# Patient Record
Sex: MALE | Race: BLACK OR AFRICAN AMERICAN | NOT HISPANIC OR LATINO | URBAN - METROPOLITAN AREA
[De-identification: names, ages, dates, MRNs, and addresses within clinical notes are randomized per-mention and may not be internally consistent; named-entity substitution may affect disease eponyms.]

---

## 2017-12-08 PROBLEM — Z00.00 ENCOUNTER FOR PREVENTIVE HEALTH EXAMINATION: Status: ACTIVE | Noted: 2017-12-08

## 2019-08-17 ENCOUNTER — INPATIENT (INPATIENT)
Facility: HOSPITAL | Age: 39
LOS: 1 days | Discharge: OTHER INSTITUTION | DRG: 638 | End: 2019-08-19
Attending: INTERNAL MEDICINE | Admitting: INTERNAL MEDICINE
Payer: MEDICAID

## 2019-08-17 VITALS
HEIGHT: 66 IN | RESPIRATION RATE: 16 BRPM | OXYGEN SATURATION: 98 % | HEART RATE: 88 BPM | SYSTOLIC BLOOD PRESSURE: 115 MMHG | DIASTOLIC BLOOD PRESSURE: 67 MMHG | WEIGHT: 169.98 LBS | TEMPERATURE: 99 F

## 2019-08-17 DIAGNOSIS — Z89.422 ACQUIRED ABSENCE OF OTHER LEFT TOE(S): Chronic | ICD-10-CM

## 2019-08-17 DIAGNOSIS — E11.9 TYPE 2 DIABETES MELLITUS WITHOUT COMPLICATIONS: ICD-10-CM

## 2019-08-17 DIAGNOSIS — E11.621 TYPE 2 DIABETES MELLITUS WITH FOOT ULCER: ICD-10-CM

## 2019-08-17 DIAGNOSIS — Z29.9 ENCOUNTER FOR PROPHYLACTIC MEASURES, UNSPECIFIED: ICD-10-CM

## 2019-08-17 DIAGNOSIS — M25.469 EFFUSION, UNSPECIFIED KNEE: ICD-10-CM

## 2019-08-17 LAB
ALBUMIN SERPL ELPH-MCNC: 2.8 G/DL — LOW (ref 3.5–5)
ALP SERPL-CCNC: 83 U/L — SIGNIFICANT CHANGE UP (ref 40–120)
ALT FLD-CCNC: 15 U/L DA — SIGNIFICANT CHANGE UP (ref 10–60)
ANION GAP SERPL CALC-SCNC: 2 MMOL/L — LOW (ref 5–17)
AST SERPL-CCNC: 13 U/L — SIGNIFICANT CHANGE UP (ref 10–40)
BASOPHILS # BLD AUTO: 0.03 K/UL — SIGNIFICANT CHANGE UP (ref 0–0.2)
BASOPHILS NFR BLD AUTO: 0.3 % — SIGNIFICANT CHANGE UP (ref 0–2)
BILIRUB SERPL-MCNC: 0.5 MG/DL — SIGNIFICANT CHANGE UP (ref 0.2–1.2)
BUN SERPL-MCNC: 18 MG/DL — SIGNIFICANT CHANGE UP (ref 7–18)
CALCIUM SERPL-MCNC: 8.9 MG/DL — SIGNIFICANT CHANGE UP (ref 8.4–10.5)
CHLORIDE SERPL-SCNC: 100 MMOL/L — SIGNIFICANT CHANGE UP (ref 96–108)
CO2 SERPL-SCNC: 33 MMOL/L — HIGH (ref 22–31)
CREAT SERPL-MCNC: 1.34 MG/DL — HIGH (ref 0.5–1.3)
EOSINOPHIL # BLD AUTO: 0.1 K/UL — SIGNIFICANT CHANGE UP (ref 0–0.5)
EOSINOPHIL NFR BLD AUTO: 1.1 % — SIGNIFICANT CHANGE UP (ref 0–6)
ERYTHROCYTE [SEDIMENTATION RATE] IN BLOOD: 84 MM/HR — HIGH (ref 0–15)
GLUCOSE BLDC GLUCOMTR-MCNC: 152 MG/DL — HIGH (ref 70–99)
GLUCOSE BLDC GLUCOMTR-MCNC: 236 MG/DL — HIGH (ref 70–99)
GLUCOSE BLDC GLUCOMTR-MCNC: 255 MG/DL — HIGH (ref 70–99)
GLUCOSE SERPL-MCNC: 301 MG/DL — HIGH (ref 70–99)
HBA1C BLD-MCNC: 14 % — HIGH (ref 4–5.6)
HCT VFR BLD CALC: 35.5 % — LOW (ref 39–50)
HGB BLD-MCNC: 11.9 G/DL — LOW (ref 13–17)
IMM GRANULOCYTES NFR BLD AUTO: 0.3 % — SIGNIFICANT CHANGE UP (ref 0–1.5)
LACTATE SERPL-SCNC: 0.9 MMOL/L — SIGNIFICANT CHANGE UP (ref 0.7–2)
LYMPHOCYTES # BLD AUTO: 1.18 K/UL — SIGNIFICANT CHANGE UP (ref 1–3.3)
LYMPHOCYTES # BLD AUTO: 13.3 % — SIGNIFICANT CHANGE UP (ref 13–44)
MAGNESIUM SERPL-MCNC: 2.5 MG/DL — SIGNIFICANT CHANGE UP (ref 1.6–2.6)
MCHC RBC-ENTMCNC: 29.2 PG — SIGNIFICANT CHANGE UP (ref 27–34)
MCHC RBC-ENTMCNC: 33.5 GM/DL — SIGNIFICANT CHANGE UP (ref 32–36)
MCV RBC AUTO: 87.2 FL — SIGNIFICANT CHANGE UP (ref 80–100)
MONOCYTES # BLD AUTO: 1.03 K/UL — HIGH (ref 0–0.9)
MONOCYTES NFR BLD AUTO: 11.6 % — SIGNIFICANT CHANGE UP (ref 2–14)
NEUTROPHILS # BLD AUTO: 6.5 K/UL — SIGNIFICANT CHANGE UP (ref 1.8–7.4)
NEUTROPHILS NFR BLD AUTO: 73.4 % — SIGNIFICANT CHANGE UP (ref 43–77)
NRBC # BLD: 0 /100 WBCS — SIGNIFICANT CHANGE UP (ref 0–0)
PHOSPHATE SERPL-MCNC: 3.2 MG/DL — SIGNIFICANT CHANGE UP (ref 2.5–4.5)
PLATELET # BLD AUTO: 291 K/UL — SIGNIFICANT CHANGE UP (ref 150–400)
POTASSIUM SERPL-MCNC: 4.7 MMOL/L — SIGNIFICANT CHANGE UP (ref 3.5–5.3)
POTASSIUM SERPL-SCNC: 4.7 MMOL/L — SIGNIFICANT CHANGE UP (ref 3.5–5.3)
PROT SERPL-MCNC: 7.5 G/DL — SIGNIFICANT CHANGE UP (ref 6–8.3)
RBC # BLD: 4.07 M/UL — LOW (ref 4.2–5.8)
RBC # FLD: 11.9 % — SIGNIFICANT CHANGE UP (ref 10.3–14.5)
SODIUM SERPL-SCNC: 135 MMOL/L — SIGNIFICANT CHANGE UP (ref 135–145)
WBC # BLD: 8.87 K/UL — SIGNIFICANT CHANGE UP (ref 3.8–10.5)
WBC # FLD AUTO: 8.87 K/UL — SIGNIFICANT CHANGE UP (ref 3.8–10.5)

## 2019-08-17 PROCEDURE — 73562 X-RAY EXAM OF KNEE 3: CPT | Mod: 26,RT

## 2019-08-17 PROCEDURE — 73660 X-RAY EXAM OF TOE(S): CPT | Mod: 26,LT

## 2019-08-17 PROCEDURE — 99285 EMERGENCY DEPT VISIT HI MDM: CPT

## 2019-08-17 PROCEDURE — 73718 MRI LOWER EXTREMITY W/O DYE: CPT | Mod: 26,LT

## 2019-08-17 RX ORDER — PIPERACILLIN AND TAZOBACTAM 4; .5 G/20ML; G/20ML
3.38 INJECTION, POWDER, LYOPHILIZED, FOR SOLUTION INTRAVENOUS ONCE
Refills: 0 | Status: COMPLETED | OUTPATIENT
Start: 2019-08-17 | End: 2019-08-17

## 2019-08-17 RX ORDER — DEXTROSE 50 % IN WATER 50 %
15 SYRINGE (ML) INTRAVENOUS ONCE
Refills: 0 | Status: DISCONTINUED | OUTPATIENT
Start: 2019-08-17 | End: 2019-08-19

## 2019-08-17 RX ORDER — AMPICILLIN SODIUM AND SULBACTAM SODIUM 250; 125 MG/ML; MG/ML
INJECTION, POWDER, FOR SUSPENSION INTRAMUSCULAR; INTRAVENOUS
Refills: 0 | Status: DISCONTINUED | OUTPATIENT
Start: 2019-08-17 | End: 2019-08-19

## 2019-08-17 RX ORDER — GLUCAGON INJECTION, SOLUTION 0.5 MG/.1ML
1 INJECTION, SOLUTION SUBCUTANEOUS ONCE
Refills: 0 | Status: DISCONTINUED | OUTPATIENT
Start: 2019-08-17 | End: 2019-08-19

## 2019-08-17 RX ORDER — INSULIN LISPRO 100/ML
VIAL (ML) SUBCUTANEOUS
Refills: 0 | Status: DISCONTINUED | OUTPATIENT
Start: 2019-08-17 | End: 2019-08-19

## 2019-08-17 RX ORDER — AMPICILLIN SODIUM AND SULBACTAM SODIUM 250; 125 MG/ML; MG/ML
3 INJECTION, POWDER, FOR SUSPENSION INTRAMUSCULAR; INTRAVENOUS EVERY 12 HOURS
Refills: 0 | Status: DISCONTINUED | OUTPATIENT
Start: 2019-08-17 | End: 2019-08-19

## 2019-08-17 RX ORDER — IBUPROFEN 200 MG
600 TABLET ORAL ONCE
Refills: 0 | Status: COMPLETED | OUTPATIENT
Start: 2019-08-17 | End: 2019-08-17

## 2019-08-17 RX ORDER — HEPARIN SODIUM 5000 [USP'U]/ML
5000 INJECTION INTRAVENOUS; SUBCUTANEOUS EVERY 8 HOURS
Refills: 0 | Status: DISCONTINUED | OUTPATIENT
Start: 2019-08-17 | End: 2019-08-19

## 2019-08-17 RX ORDER — INSULIN GLARGINE 100 [IU]/ML
15 INJECTION, SOLUTION SUBCUTANEOUS AT BEDTIME
Refills: 0 | Status: DISCONTINUED | OUTPATIENT
Start: 2019-08-17 | End: 2019-08-19

## 2019-08-17 RX ORDER — AMPICILLIN SODIUM AND SULBACTAM SODIUM 250; 125 MG/ML; MG/ML
3 INJECTION, POWDER, FOR SUSPENSION INTRAMUSCULAR; INTRAVENOUS ONCE
Refills: 0 | Status: COMPLETED | OUTPATIENT
Start: 2019-08-17 | End: 2019-08-17

## 2019-08-17 RX ORDER — VANCOMYCIN HCL 1 G
1000 VIAL (EA) INTRAVENOUS ONCE
Refills: 0 | Status: COMPLETED | OUTPATIENT
Start: 2019-08-17 | End: 2019-08-17

## 2019-08-17 RX ORDER — ACETAMINOPHEN 500 MG
650 TABLET ORAL EVERY 6 HOURS
Refills: 0 | Status: DISCONTINUED | OUTPATIENT
Start: 2019-08-17 | End: 2019-08-19

## 2019-08-17 RX ORDER — SODIUM CHLORIDE 9 MG/ML
1000 INJECTION, SOLUTION INTRAVENOUS
Refills: 0 | Status: DISCONTINUED | OUTPATIENT
Start: 2019-08-17 | End: 2019-08-19

## 2019-08-17 RX ORDER — SODIUM CHLORIDE 9 MG/ML
1000 INJECTION INTRAMUSCULAR; INTRAVENOUS; SUBCUTANEOUS ONCE
Refills: 0 | Status: COMPLETED | OUTPATIENT
Start: 2019-08-17 | End: 2019-08-17

## 2019-08-17 RX ADMIN — AMPICILLIN SODIUM AND SULBACTAM SODIUM 200 GRAM(S): 250; 125 INJECTION, POWDER, FOR SUSPENSION INTRAMUSCULAR; INTRAVENOUS at 12:01

## 2019-08-17 RX ADMIN — Medication 250 MILLIGRAM(S): at 14:24

## 2019-08-17 RX ADMIN — AMPICILLIN SODIUM AND SULBACTAM SODIUM 200 GRAM(S): 250; 125 INJECTION, POWDER, FOR SUSPENSION INTRAMUSCULAR; INTRAVENOUS at 18:22

## 2019-08-17 RX ADMIN — Medication 600 MILLIGRAM(S): at 07:45

## 2019-08-17 RX ADMIN — SODIUM CHLORIDE 1000 MILLILITER(S): 9 INJECTION INTRAMUSCULAR; INTRAVENOUS; SUBCUTANEOUS at 10:52

## 2019-08-17 RX ADMIN — Medication 600 MILLIGRAM(S): at 07:11

## 2019-08-17 RX ADMIN — PIPERACILLIN AND TAZOBACTAM 200 GRAM(S): 4; .5 INJECTION, POWDER, LYOPHILIZED, FOR SOLUTION INTRAVENOUS at 10:52

## 2019-08-17 RX ADMIN — Medication 2: at 17:37

## 2019-08-17 RX ADMIN — INSULIN GLARGINE 15 UNIT(S): 100 INJECTION, SOLUTION SUBCUTANEOUS at 21:25

## 2019-08-17 RX ADMIN — Medication 3: at 12:07

## 2019-08-17 NOTE — H&P ADULT - PROBLEM SELECTOR PLAN 2
-Patient is not taking any medications currently. He was taking 20 units insulin and stopped last month.:   Accucheck:  Before meals and at bedtime  f/u Hba1c -Patient is not taking any medications currently. He was taking 20 units insulin and stopped last month.:   Blood sugars are high 301 and Cr is 1.34, baseline unknown most likely due to diabetes complications.  Started on Lantus 15 units  Accucheck:  Before meals and at bedtime  f/u Hba1c

## 2019-08-17 NOTE — H&P ADULT - PROBLEM SELECTOR PLAN 1
DM, Vitals are stable  f/u ESR, CRP  No fever No leukocytosis   ED s/p zosyn and vanco  Will get the Blood cultures   c/w unasyn 3gm every 12 hours  c/w pain management tylenol  f/u xray left toe  F/u podiatry   f/u MRI DM, Vitals are stable  f/u ESR, CRP  No fever No leukocytosis   ED s/p zosyn and vanco  Will get the Blood cultures   c/w unasyn 3gm every 12 hours  c/w pain management tylenol  f/u xray left toe  F/u podiatry   f/u MRI of left foot

## 2019-08-17 NOTE — ED PROVIDER NOTE - PROGRESS NOTE DETAILS
bedside sono reveals no drainable fluid collection in prepatellar area. also noted sock on left foot with brown stain, sock removed and I noted a 2x2cm ulceration at the base of the big toe, patient reports he noted it 2 days ago. no fever no pain. patient also reports he stopped taking insulin and Diabetes Mellitus medication 2 weeks ago because it makes him ho to bathroom. admit for IV antibiotics, podiatry consult and glycemic control. also noted left foot status post 2nd toe amputation (pt reports about a year ago)

## 2019-08-17 NOTE — ED ADULT NURSE NOTE - OBJECTIVE STATEMENT
presented to ed with c/o r knee pain for 3 days denies any injury, patient says it started as a cut and now there is a scab, able to walk  wound on the  l big toe, healing and l 4 th toe amputated.

## 2019-08-17 NOTE — PATIENT PROFILE ADULT - NSASFUNCLEVELADLTRANSFER_GEN_A_NUR
Principal Discharge DX:	Acute left ankle pain Principal Discharge DX:	Acute left ankle pain  Instructions for follow-up, activity and diet:	Follow up with orthopedics in 1-2 days.  (See list attached)  Rest, Ice 3-4 x a day x 48hours, elevate ankle, ace/aircast.  Use crutches to ambulate.  Take Motrin 600mg orally every 8 hours as needed for pain take with food.  Return to the ER for any persistent/worsening or new symptoms weakness, numbness or any concerning symptoms. 0 = independent

## 2019-08-17 NOTE — H&P ADULT - NSHPREVIEWOFSYSTEMS_GEN_ALL_CORE
REVIEW OF SYSTEMS:    CONSTITUTIONAL: No weakness, fevers or chills  EYES/ENT: No visual changes;  No vertigo or throat pain   NECK: No pain or stiffness  RESPIRATORY: No cough, wheezing, hemoptysis; No shortness of breath  CARDIOVASCULAR: No chest pain or palpitations  GASTROINTESTINAL: No abdominal or epigastric pain. No nausea, vomiting, or hematemesis; No diarrhea or constipation. No melena or hematochezia.  GENITOURINARY: No dysuria, frequency or hematuria  NEUROLOGICAL: No numbness or weakness  SKIN: itching, is present   All other review of systems is negative unless indicated above.

## 2019-08-17 NOTE — H&P ADULT - NSHPLABSRESULTS_GEN_ALL_CORE
LABS:                        11.9   8.87  )-----------( 291      ( 17 Aug 2019 08:37 )             35.5     08-17    135  |  100  |  18  ----------------------------<  301<H>  4.7   |  33<H>  |  1.34<H>    Ca    8.9      17 Aug 2019 08:37    TPro  7.5  /  Alb  2.8<L>  /  TBili  0.5  /  DBili  x   /  AST  13  /  ALT  15  /  AlkPhos  83  08-17        LIVER FUNCTIONS - ( 17 Aug 2019 08:37 )  Alb: 2.8 g/dL / Pro: 7.5 g/dL / ALK PHOS: 83 U/L / ALT: 15 U/L DA / AST: 13 U/L / GGT: x

## 2019-08-17 NOTE — ED PROVIDER NOTE - LOWER EXTREMITY EXAM, RIGHT
JOINT SWELLING/R knee swelling with no tenderness, full range of motion, R knee warm to touch compared to L knee

## 2019-08-17 NOTE — H&P ADULT - HISTORY OF PRESENT ILLNESS
40 y/o M came from Shelter with PMHx of DM non compliant with medications and PSx of Rt 4 th toe due to infection last yr presents to the ED with complaints of R knee swelling x 1 week. Patient states he had itching on Rt knee, scab was formed and then little pus was draining on squeezing since 2 days. His swelling is progressively worsening and unable to walk. He is feeling hot but never checked his temperature. He has ulcer on left great toe but no pain. He was diagnosed with DM 5 years ago, he was treated initially with pills but not well controlled. Insulin 20 units was added. He was complaining of diarrhea x 3 times since he was started on insulin and pills. He stopped taking insulin and pills since last month due to worsening diarrhea. Pt is unable to recall his medications, Pharmacy and PMD. Denies fever, chills, trauma, Nausea, vomiting.

## 2019-08-17 NOTE — ED PROVIDER NOTE - PMH
No pertinent past medical history Bleeding that does not stop/Fever greater than 101/Pain not relieved by Medications <<----- Click to add NO pertinent Past Medical History

## 2019-08-17 NOTE — ED PROVIDER NOTE - OBJECTIVE STATEMENT
40 y/o M with no significant PMHx/PSHx presents to the ED with complaints of R knee swelling x few days. Patient states he was squeezing it yesterday and pus came out of the scab. Denies fever, chills or any other acute complaints.

## 2019-08-17 NOTE — ED ADULT NURSE NOTE - NSIMPLEMENTINTERV_GEN_ALL_ED
Implemented All Universal Safety Interventions:  [place] to call system. Call bell, personal items and telephone within reach. Instruct patient to call for assistance. Room bathroom lighting operational. Non-slip footwear when patient is off stretcher. Physically safe environment: no spills, clutter or unnecessary equipment. Stretcher in lowest position, wheels locked, appropriate side rails in place.

## 2019-08-17 NOTE — ED PROVIDER NOTE - CLINICAL SUMMARY MEDICAL DECISION MAKING FREE TEXT BOX
38 y/o M presents with R knee swelling. X-ray revealed soft tissue swelling. Which count is normal. Will obtain bedside ultrasound to check for collection.

## 2019-08-17 NOTE — CONSULT NOTE ADULT - SUBJECTIVE AND OBJECTIVE BOX
Patient is a 39y old  Male who presents with a chief complaint of right knee pain and left hallux ulceration    HPI: This 39 year old diabetic male presents to the ED with complaint of right knee pain with pus.  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  He states that he notice his hallux ulcer approximately 3 days ago, but did not do any treatment for it.  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  He denies trauma to the hallux.  He states when he had his previous amputation, the toe was red, swollen, and infected.  He states he stopped taking his diabetes medication and his sugar is high.  He denies n/v/d/f at this time      PMH: History of left 4th partial ray amputation    Allergies: No Known Allergies    Medications: piperacillin/tazobactam IVPB. 3.375 Gram(s) IV Intermittent Once  sodium chloride 0.9% Bolus 1000 milliLiter(s) IV Bolus once  vancomycin  IVPB 1000 milliGRAM(s) IV Intermittent once    FH:  PSX: No significant past surgical history    SH: piperacillin/tazobactam IVPB. 3.375 Gram(s) IV Intermittent Once  sodium chloride 0.9% Bolus 1000 milliLiter(s) IV Bolus once  vancomycin  IVPB 1000 milliGRAM(s) IV Intermittent once      Vital Signs Last 24 Hrs  T(C): 36.8 (17 Aug 2019 10:33), Max: 37.1 (17 Aug 2019 06:15)  T(F): 98.2 (17 Aug 2019 10:33), Max: 98.8 (17 Aug 2019 06:15)  HR: 75 (17 Aug 2019 10:33) (75 - 88)  BP: 111/66 (17 Aug 2019 10:33) (111/66 - 115/67)  BP(mean): --  RR: 16 (17 Aug 2019 10:33) (16 - 16)  SpO2: 98% (17 Aug 2019 10:33) (98% - 98%)    LABS                        11.9   8.87  )-----------( 291      ( 17 Aug 2019 08:37 )             35.5               08-17    135  |  100  |  18  ----------------------------<  301<H>  4.7   |  33<H>  |  1.34<H>    Ca    8.9      17 Aug 2019 08:37    TPro  7.5  /  Alb  2.8<L>  /  TBili  0.5  /  DBili  x   /  AST  13  /  ALT  15  /  AlkPhos  83  08-17        PHYSICAL EXAM  GEN: COLE FERNANDEZ is a pleasant well-nourished, well developed 39y Male in no acute distress, alert awake, and oriented to person, place and time.   LE Focused:    Vasc:  DP and PT pulses palpable.  Unable to assess CFT to left hallux. CFT brisk to all other digits. Mild swelling of left hallux.    Derm: Left hallux ulceration of distal aspect measuring approxately 2.5cm x 2.5cm x 0.2cm down to the level of fascia.  Granular base with hyperkeratotic rim.  No malodor, no purulence, no probe to bone, mild periwound erythema.    Neuro: Diminished protective sensation b/l  MSK: No gross structural abnormalities    Imaging: xray taken-pending  Cultures: taken pending    A: Pressure ulcer left hallux    P:  Patient evaluated, chart reviewed  Xrays reviewed pending official read  Wound culture taken-pending  Recommend MRI of left foot  Recommend antibiotics per ID or primary team  Podiatry will follow while in house  Dress with 4x4 gauze, kenneth.  Recommend dressing changes of betadine, 4x4 gauze, kenneth.   Pending ESR/CRP.  Podiatry will following while in house  Discussed with Attending Dr. Mackay.

## 2019-08-17 NOTE — H&P ADULT - PROBLEM SELECTOR PLAN 3
Left knee swelling.  Xray showed marked prepatellar soft tissue swelling, small suprapatellar effusion. no fracture.

## 2019-08-17 NOTE — H&P ADULT - NSHPPHYSICALEXAM_GEN_ALL_CORE
CONSTITUTIONAL: Well appearing, well nourished, awake, alert and in no apparent distress  ENT: Airway patent, Nasal mucosa clear. Mouth with normal mucosa. Throat has no vesicles, no oropharyngeal exudates and uvula is midline.  EYES: Clear bilaterally, pupils equal, round and reactive to light. EOMI.  CARDIAC: Normal rate, regular rhythm.  Heart sounds S1, S2.  No murmurs, rubs or gallops   RESPIRATORY: Breath sounds clear and equal bilaterally. No wheezes, rhales or rhonchi  MUSCULOSKELETAL: Spine appears normal, range of motion is not limited, no muscle or joint tenderness  EXTREMITIES: Rt knee is swollen and 2 pus points are seen, no limited ROM.  NEUROLOGICAL: Alert and oriented, no focal deficits, no motor or sensory deficits.  SKIN: Ulcer on the dorsum surface of left great toe, measuring 5 x 5 cm, granulation tissue is present, no purulent drainage, no redness, no warmth, tender to touch, sensations intact and pulses intact.

## 2019-08-17 NOTE — H&P ADULT - PROBLEM SELECTOR PLAN 4
IMPROVE VTE Individual Risk Assessment    RISK                                                                Points  [  ] Previous VTE                                                  3  [  ] Thrombophilia                                               2  [  ] Lower limb paralysis                                      2        (unable to hold up >15 seconds)    [  ] Current Cancer                                              2         (within 6 months)  [x ] Immobilization > 24 hrs                                1  [  ] ICU/CCU stay > 24 hours                              1  [  ] Age > 60                                                      1  IMPROVE VTE Score _No __DVT ppx as the pt is ambulating._____  )

## 2019-08-18 LAB
24R-OH-CALCIDIOL SERPL-MCNC: 9.4 NG/ML — LOW (ref 30–80)
ANION GAP SERPL CALC-SCNC: 4 MMOL/L — LOW (ref 5–17)
BUN SERPL-MCNC: 16 MG/DL — SIGNIFICANT CHANGE UP (ref 7–18)
CALCIUM SERPL-MCNC: 8.5 MG/DL — SIGNIFICANT CHANGE UP (ref 8.4–10.5)
CHLORIDE SERPL-SCNC: 104 MMOL/L — SIGNIFICANT CHANGE UP (ref 96–108)
CO2 SERPL-SCNC: 30 MMOL/L — SIGNIFICANT CHANGE UP (ref 22–31)
CREAT SERPL-MCNC: 1.2 MG/DL — SIGNIFICANT CHANGE UP (ref 0.5–1.3)
CRP SERPL-MCNC: 12.45 MG/DL — HIGH (ref 0–0.4)
FOLATE SERPL-MCNC: 16.5 NG/ML — SIGNIFICANT CHANGE UP
GLUCOSE BLDC GLUCOMTR-MCNC: 183 MG/DL — HIGH (ref 70–99)
GLUCOSE BLDC GLUCOMTR-MCNC: 194 MG/DL — HIGH (ref 70–99)
GLUCOSE BLDC GLUCOMTR-MCNC: 213 MG/DL — HIGH (ref 70–99)
GLUCOSE BLDC GLUCOMTR-MCNC: 301 MG/DL — HIGH (ref 70–99)
GLUCOSE BLDC GLUCOMTR-MCNC: 94 MG/DL — SIGNIFICANT CHANGE UP (ref 70–99)
GLUCOSE SERPL-MCNC: 221 MG/DL — HIGH (ref 70–99)
HBA1C BLD-MCNC: 14.1 % — HIGH (ref 4–5.6)
HCT VFR BLD CALC: 35.6 % — LOW (ref 39–50)
HGB BLD-MCNC: 11.7 G/DL — LOW (ref 13–17)
MAGNESIUM SERPL-MCNC: 2.3 MG/DL — SIGNIFICANT CHANGE UP (ref 1.6–2.6)
MCHC RBC-ENTMCNC: 28.7 PG — SIGNIFICANT CHANGE UP (ref 27–34)
MCHC RBC-ENTMCNC: 32.9 GM/DL — SIGNIFICANT CHANGE UP (ref 32–36)
MCV RBC AUTO: 87.5 FL — SIGNIFICANT CHANGE UP (ref 80–100)
NRBC # BLD: 0 /100 WBCS — SIGNIFICANT CHANGE UP (ref 0–0)
PHOSPHATE SERPL-MCNC: 2.9 MG/DL — SIGNIFICANT CHANGE UP (ref 2.5–4.5)
PLATELET # BLD AUTO: 325 K/UL — SIGNIFICANT CHANGE UP (ref 150–400)
POTASSIUM SERPL-MCNC: 4.2 MMOL/L — SIGNIFICANT CHANGE UP (ref 3.5–5.3)
POTASSIUM SERPL-SCNC: 4.2 MMOL/L — SIGNIFICANT CHANGE UP (ref 3.5–5.3)
PREALB SERPL-MCNC: 7 MG/DL — LOW (ref 20–40)
RBC # BLD: 4.07 M/UL — LOW (ref 4.2–5.8)
RBC # FLD: 11.9 % — SIGNIFICANT CHANGE UP (ref 10.3–14.5)
SODIUM SERPL-SCNC: 138 MMOL/L — SIGNIFICANT CHANGE UP (ref 135–145)
TSH SERPL-MCNC: 0.45 UU/ML — SIGNIFICANT CHANGE UP (ref 0.34–4.82)
VIT B12 SERPL-MCNC: 403 PG/ML — SIGNIFICANT CHANGE UP (ref 232–1245)
WBC # BLD: 7.76 K/UL — SIGNIFICANT CHANGE UP (ref 3.8–10.5)
WBC # FLD AUTO: 7.76 K/UL — SIGNIFICANT CHANGE UP (ref 3.8–10.5)

## 2019-08-18 RX ADMIN — HEPARIN SODIUM 5000 UNIT(S): 5000 INJECTION INTRAVENOUS; SUBCUTANEOUS at 21:54

## 2019-08-18 RX ADMIN — AMPICILLIN SODIUM AND SULBACTAM SODIUM 200 GRAM(S): 250; 125 INJECTION, POWDER, FOR SUSPENSION INTRAMUSCULAR; INTRAVENOUS at 17:36

## 2019-08-18 RX ADMIN — Medication 1: at 08:41

## 2019-08-18 RX ADMIN — Medication 4: at 17:36

## 2019-08-18 RX ADMIN — AMPICILLIN SODIUM AND SULBACTAM SODIUM 200 GRAM(S): 250; 125 INJECTION, POWDER, FOR SUSPENSION INTRAMUSCULAR; INTRAVENOUS at 05:39

## 2019-08-18 RX ADMIN — HEPARIN SODIUM 5000 UNIT(S): 5000 INJECTION INTRAVENOUS; SUBCUTANEOUS at 05:39

## 2019-08-18 RX ADMIN — INSULIN GLARGINE 15 UNIT(S): 100 INJECTION, SOLUTION SUBCUTANEOUS at 21:54

## 2019-08-18 NOTE — PROGRESS NOTE ADULT - SUBJECTIVE AND OBJECTIVE BOX
Patient is a 39y old  Male who presents with a chief complaint of right knee pain and left hallux ulceration    HPI: This 39 year old diabetic male presents to the ED with complaint of right knee pain with pus.  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  He states that he notice his hallux ulcer approximately 3 days ago, but did not do any treatment for it.  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  He denies trauma to the hallux.  He states when he had his previous amputation, the toe was red, swollen, and infected.  He states he stopped taking his diabetes medication and his sugar is high.  He denies n/v/d/f at this time  Pt was seen today by podiatry resting comfortably in bed. Pt denies any pain, F/N/V/C/SOB.    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 3.375 Gram(s) IV Intermittent Once  sodium chloride 0.9% Bolus 1000 milliLiter(s) IV Bolus once  vancomycin  IVPB 1000 milliGRAM(s) IV Intermittent once    FH:  PSX: No significant past surgical history    SH: piperacillin/tazobactam IVPB. 3.375 Gram(s) IV Intermittent Once  sodium chloride 0.9% Bolus 1000 milliLiter(s) IV Bolus once  vancomycin  IVPB 1000 milliGRAM(s) IV Intermittent once      Vital Signs Last 24 Hrs  T(C): 36.8 (17 Aug 2019 10:33), Max: 37.1 (17 Aug 2019 06:15)  T(F): 98.2 (17 Aug 2019 10:33), Max: 98.8 (17 Aug 2019 06:15)  HR: 75 (17 Aug 2019 10:33) (75 - 88)  BP: 111/66 (17 Aug 2019 10:33) (111/66 - 115/67)  BP(mean): --  RR: 16 (17 Aug 2019 10:33) (16 - 16)  SpO2: 98% (17 Aug 2019 10:33) (98% - 98%)    LABS                        11.9   8.87  )-----------( 291      ( 17 Aug 2019 08:37 )             35.5               08-17    135  |  100  |  18  ----------------------------<  301<H>  4.7   |  33<H>  |  1.34<H>    Ca    8.9      17 Aug 2019 08:37    TPro  7.5  /  Alb  2.8<L>  /  TBili  0.5  /  DBili  x   /  AST  13  /  ALT  15  /  AlkPhos  83  08-17        PHYSICAL EXAM  GEN: COLE FERNANDEZ is a pleasant well-nourished, well developed 39y Male in no acute distress, alert awake, and oriented to person, place and time.   LE Focused:    Vasc:  DP and PT pulses palpable.  Unable to assess CFT to left hallux. CFT brisk to all other digits. Mild swelling of left hallux.    Derm: Left hallux ulceration of distal aspect measuring approxately 2.5cm x 2.5cm x 0.2cm down to the level of fascia.  Granular base with hyperkeratotic rim.  No malodor, no purulence, no probe to bone, mild periwound erythema.    Neuro: Diminished protective sensation b/l  MSK: No gross structural abnormalities    Imaging: xray taken-pending  Cultures: taken pending    A: Pressure ulcer left hallux    P:  Patient evaluated, chart reviewed  Xrays reviewed pending official read  Wound culture taken-pending  MRI of left foot reviewed OM of distal phalanx left hallux and foreign body first interspace left foot   Recommend ID consult  Recommend antibiotics per ID or primary team  Podiatry will follow while in house  Dress with 4x4 gauze, kenneth.  Recommend dressing changes of betadine, 4x4 gauze, kenneth.   Pending ESR/CRP.  Podiatry will following while in house  Discussed with Attending Dr. Mackay and seen with Dr Mendenhall

## 2019-08-18 NOTE — PROGRESS NOTE ADULT - SUBJECTIVE AND OBJECTIVE BOX
Patient is a 39y old  Male who presents with a chief complaint of Right knee swelling (18 Aug 2019 09:23)    PATIENT IS SEEN AND EXAMINED IN MEDICAL FLOOR.    ALLERGIES:  No Known Allergies    VITALS:    Vital Signs Last 24 Hrs  T(C): 36.9 (18 Aug 2019 16:12), Max: 37.2 (18 Aug 2019 00:01)  T(F): 98.4 (18 Aug 2019 16:12), Max: 99 (18 Aug 2019 00:01)  HR: 84 (18 Aug 2019 16:12) (80 - 84)  BP: 138/74 (18 Aug 2019 16:12) (105/50 - 138/74)  BP(mean): --  RR: 17 (18 Aug 2019 16:12) (17 - 18)  SpO2: 100% (18 Aug 2019 16:12) (98% - 100%)    LABS:    CBC Full  -  ( 18 Aug 2019 07:04 )  WBC Count : 7.76 K/uL  RBC Count : 4.07 M/uL  Hemoglobin : 11.7 g/dL  Hematocrit : 35.6 %  Platelet Count - Automated : 325 K/uL  Mean Cell Volume : 87.5 fl  Mean Cell Hemoglobin : 28.7 pg  Mean Cell Hemoglobin Concentration : 32.9 gm/dL  Auto Neutrophil # : x  Auto Lymphocyte # : x  Auto Monocyte # : x  Auto Eosinophil # : x  Auto Basophil # : x  Auto Neutrophil % : x  Auto Lymphocyte % : x  Auto Monocyte % : x  Auto Eosinophil % : x  Auto Basophil % : x      08-18    138  |  104  |  16  ----------------------------<  221<H>  4.2   |  30  |  1.20    Ca    8.5      18 Aug 2019 07:04  Phos  2.9     08-18  Mg     2.3     08-18    TPro  7.5  /  Alb  2.8<L>  /  TBili  0.5  /  DBili  x   /  AST  13  /  ALT  15  /  AlkPhos  83  08-17    CAPILLARY BLOOD GLUCOSE    POCT Blood Glucose.: 213 mg/dL (18 Aug 2019 21:30)  POCT Blood Glucose.: 301 mg/dL (18 Aug 2019 16:46)  POCT Blood Glucose.: 94 mg/dL (18 Aug 2019 11:58)  POCT Blood Glucose.: 183 mg/dL (18 Aug 2019 08:32)  POCT Blood Glucose.: 194 mg/dL (18 Aug 2019 08:31)    LIVER FUNCTIONS - ( 17 Aug 2019 08:37 )  Alb: 2.8 g/dL / Pro: 7.5 g/dL / ALK PHOS: 83 U/L / ALT: 15 U/L DA / AST: 13 U/L / GGT: x           Creatinine Trend: 1.20<--, 1.34<--  I&O's Summary    .Surgical Swab  08-17 @ 17:27   Moderate Citrobacter koseri  Moderate Acinetobacter baumannii nosocomialis group  Moderate Streptococcus agalactiae (Group B) isolated  Group B streptococci are susceptible to ampicillin,  penicillin and cefazolin, but may be resistant to  erythromycin and clindamycin.  Recommendations for intrapartum prophylaxis for Group B  streptococci are penicillin or ampicillin.  --  --      .Blood  08-17 @ 16:50   No growth to date.  --  --          MEDICATIONS:    MEDICATIONS  (STANDING):  ampicillin/sulbactam  IVPB 3 Gram(s) IV Intermittent every 12 hours  ampicillin/sulbactam  IVPB      dextrose 5%. 1000 milliLiter(s) (50 mL/Hr) IV Continuous <Continuous>  heparin  Injectable 5000 Unit(s) SubCutaneous every 8 hours  insulin glargine Injectable (LANTUS) 15 Unit(s) SubCutaneous at bedtime  insulin lispro (HumaLOG) corrective regimen sliding scale   SubCutaneous three times a day before meals      MEDICATIONS  (PRN):  acetaminophen   Tablet .. 650 milliGRAM(s) Oral every 6 hours PRN Mild Pain (1 - 3)  dextrose 40% Gel 15 Gram(s) Oral once PRN Blood Glucose LESS THAN 70 milliGRAM(s)/deciLiter  glucagon  Injectable 1 milliGRAM(s) IntraMuscular once PRN Glucose <70 milliGRAM(s)/deciLiter      REVIEW OF SYSTEMS:                           ALL ROS DONE [ X   ]    CONSTITUTIONAL:  LETHARGIC [   ], FEVER [   ], UNRESPONSIVE [   ]  CVS:  CP  [   ], SOB, [   ], PALPITATIONS [   ], DIZZYNESS [   ]  RS: COUGH [   ], SPUTUM [   ]  GI: ABDOMINAL PAIN [   ], NAUSEA [   ], VOMITINGS [   ], DIARRHEA [   ], CONSTIPATION [   ]  :  DYSURIA [   ], NOCTURIA [   ], INCREASED FREQUENCY [   ], DRIBLING [   ],  SKELETAL: PAINFUL JOINTS [   ], SWOLLEN JOINTS [   ], NECK ACHE [   ], LOW BACK ACHE [   ],  SKIN : ULCERS [   ], RASH [   ], ITCHING [   ]  CNS: HEAD ACHE [   ], DOUBLE VISION [   ], BLURRED VISION [   ], AMS / CONFUSION [   ], SEIZURES [   ], WEAKNESS [   ],TINGLING / NUMBNESS [   ]    PHYSICAL EXAMINATION:  GENERAL APPEARANCE: NO DISTRESS  HEENT:  NO PALLOR, NO  JVD,  NO   NODES, NECK SUPPLE  CVS: S1 +, S2 +,   RS: AEEB,  OCCASIONAL  RALES +,   NO RONCHI  ABD: SOFT, NT, NO, BS +  EXT: NO PE                      LEFT BIG TOE ULCER +  SKIN: WARM,   SKELETAL:  ROM ACCEPTABLE  CNS:  AAO X 3 ,  NO DEFICITS    RADIOLOGY :  < from: MR Foot No Cont, Left (08.17.19 @ 12:01) >    IMPRESSION:    Osteomyelitis of the distal first phalanx. Fluid within the extensor   tendon sheath, which may represent infectious tenosynovitis.  Focus of susceptibility within the plantar soft tissues at the level of   the first web space, consistent with radiopaque foreign body.     < end of copied text >    < from: Xray Toes, Left Foot (08.17.19 @ 10:20) >    Impression: No evidence for an acute fracture or dislocation.    The joint spaces are preserved.    The osseous mineralization is within normal limits.    No radiographic evidence for osteomyelitis. If clinical suspicion for   osteomyelitis is high, MR may be pursued for further evaluation.    Apparent 7 mm linear radiopaque foreign body in the soft tissue adjacent   to the left first proximal phalanx. This finding is communicated with the   emergency department via the PACS communication system.    < end of copied text >    < from: Xray Knee 3 Views, Right (08.17.19 @ 07:36) >  Impression:    No fracture    Marked prepatellar soft tissue swelling.    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 Patient states he had itching on Rt knee, scab was formed and then little pus was draining on squeezing since 2 days. His swelling is progressively worsening and unable to walk. He is feeling hot but never checked his temperature. He has ulcer on left great toe but no pain. He was diagnosed with DM 5 years ago, he was treated initially with pills but not well controlled. Insulin 20 units was added. He was complaining of diarrhea x 3 times since he was started on insulin and pills. He stopped taking insulin and pills since last month due to worsening diarrhea. Pt is unable to recall his medications, Pharmacy and PMD. Denies fever, chills, trauma, Nausea, vomiting. (17 Aug 2019 10:22)    - OSTEOMYELITIS OF TERMINAL PHALANX OF LEFT BIG TOE, FOREIGN BODY IN FIRST WEB SPACE OF LEFT FOOT. ON UNASYN. PODIATRY F/UP IS IN PROGRESS  - DIABETIC LEFT FOOT ULCER, DIABETIC PERIPHERAL NEUROPATHY  - GI AND DVT PROPHYLAXIS  -

## 2019-08-19 ENCOUNTER — TRANSCRIPTION ENCOUNTER (OUTPATIENT)
Age: 39
End: 2019-08-19

## 2019-08-19 VITALS
DIASTOLIC BLOOD PRESSURE: 64 MMHG | SYSTOLIC BLOOD PRESSURE: 103 MMHG | TEMPERATURE: 98 F | RESPIRATION RATE: 16 BRPM | HEART RATE: 99 BPM | OXYGEN SATURATION: 100 %

## 2019-08-19 DIAGNOSIS — L03.90 CELLULITIS, UNSPECIFIED: ICD-10-CM

## 2019-08-19 LAB
-  AMIKACIN: SIGNIFICANT CHANGE UP
-  AMIKACIN: SIGNIFICANT CHANGE UP
-  AMOXICILLIN/CLAVULANIC ACID: SIGNIFICANT CHANGE UP
-  AMPICILLIN/SULBACTAM: SIGNIFICANT CHANGE UP
-  AMPICILLIN/SULBACTAM: SIGNIFICANT CHANGE UP
-  AMPICILLIN: SIGNIFICANT CHANGE UP
-  AZTREONAM: SIGNIFICANT CHANGE UP
-  CEFAZOLIN: SIGNIFICANT CHANGE UP
-  CEFEPIME: SIGNIFICANT CHANGE UP
-  CEFEPIME: SIGNIFICANT CHANGE UP
-  CEFOXITIN: SIGNIFICANT CHANGE UP
-  CEFTAZIDIME: SIGNIFICANT CHANGE UP
-  CEFTRIAXONE: SIGNIFICANT CHANGE UP
-  CIPROFLOXACIN: SIGNIFICANT CHANGE UP
-  CIPROFLOXACIN: SIGNIFICANT CHANGE UP
-  ERTAPENEM: SIGNIFICANT CHANGE UP
-  GENTAMICIN: SIGNIFICANT CHANGE UP
-  GENTAMICIN: SIGNIFICANT CHANGE UP
-  IMIPENEM: SIGNIFICANT CHANGE UP
-  IMIPENEM: SIGNIFICANT CHANGE UP
-  LEVOFLOXACIN: SIGNIFICANT CHANGE UP
-  LEVOFLOXACIN: SIGNIFICANT CHANGE UP
-  MEROPENEM: SIGNIFICANT CHANGE UP
-  MEROPENEM: SIGNIFICANT CHANGE UP
-  PIPERACILLIN/TAZOBACTAM: SIGNIFICANT CHANGE UP
-  PIPERACILLIN/TAZOBACTAM: SIGNIFICANT CHANGE UP
-  TOBRAMYCIN: SIGNIFICANT CHANGE UP
-  TOBRAMYCIN: SIGNIFICANT CHANGE UP
-  TRIMETHOPRIM/SULFAMETHOXAZOLE: SIGNIFICANT CHANGE UP
-  TRIMETHOPRIM/SULFAMETHOXAZOLE: SIGNIFICANT CHANGE UP
ANION GAP SERPL CALC-SCNC: 6 MMOL/L — SIGNIFICANT CHANGE UP (ref 5–17)
BUN SERPL-MCNC: 11 MG/DL — SIGNIFICANT CHANGE UP (ref 7–18)
CALCIUM SERPL-MCNC: 8.8 MG/DL — SIGNIFICANT CHANGE UP (ref 8.4–10.5)
CHLORIDE SERPL-SCNC: 102 MMOL/L — SIGNIFICANT CHANGE UP (ref 96–108)
CO2 SERPL-SCNC: 30 MMOL/L — SIGNIFICANT CHANGE UP (ref 22–31)
CREAT SERPL-MCNC: 1.08 MG/DL — SIGNIFICANT CHANGE UP (ref 0.5–1.3)
CULTURE RESULTS: SIGNIFICANT CHANGE UP
GLUCOSE BLDC GLUCOMTR-MCNC: 120 MG/DL — HIGH (ref 70–99)
GLUCOSE BLDC GLUCOMTR-MCNC: 221 MG/DL — HIGH (ref 70–99)
GLUCOSE BLDC GLUCOMTR-MCNC: 248 MG/DL — HIGH (ref 70–99)
GLUCOSE SERPL-MCNC: 158 MG/DL — HIGH (ref 70–99)
HCT VFR BLD CALC: 35.6 % — LOW (ref 39–50)
HGB BLD-MCNC: 11.5 G/DL — LOW (ref 13–17)
MCHC RBC-ENTMCNC: 28 PG — SIGNIFICANT CHANGE UP (ref 27–34)
MCHC RBC-ENTMCNC: 32.3 GM/DL — SIGNIFICANT CHANGE UP (ref 32–36)
MCV RBC AUTO: 86.8 FL — SIGNIFICANT CHANGE UP (ref 80–100)
METHOD TYPE: SIGNIFICANT CHANGE UP
NRBC # BLD: 0 /100 WBCS — SIGNIFICANT CHANGE UP (ref 0–0)
ORGANISM # SPEC MICROSCOPIC CNT: SIGNIFICANT CHANGE UP
PLATELET # BLD AUTO: 351 K/UL — SIGNIFICANT CHANGE UP (ref 150–400)
POTASSIUM SERPL-MCNC: 4 MMOL/L — SIGNIFICANT CHANGE UP (ref 3.5–5.3)
POTASSIUM SERPL-SCNC: 4 MMOL/L — SIGNIFICANT CHANGE UP (ref 3.5–5.3)
RBC # BLD: 4.1 M/UL — LOW (ref 4.2–5.8)
RBC # FLD: 11.8 % — SIGNIFICANT CHANGE UP (ref 10.3–14.5)
SODIUM SERPL-SCNC: 138 MMOL/L — SIGNIFICANT CHANGE UP (ref 135–145)
SPECIMEN SOURCE: SIGNIFICANT CHANGE UP
WBC # BLD: 7.61 K/UL — SIGNIFICANT CHANGE UP (ref 3.8–10.5)
WBC # FLD AUTO: 7.61 K/UL — SIGNIFICANT CHANGE UP (ref 3.8–10.5)

## 2019-08-19 PROCEDURE — 93923 UPR/LXTR ART STDY 3+ LVLS: CPT | Mod: 26

## 2019-08-19 PROCEDURE — 73718 MRI LOWER EXTREMITY W/O DYE: CPT

## 2019-08-19 PROCEDURE — 82607 VITAMIN B-12: CPT

## 2019-08-19 PROCEDURE — 84443 ASSAY THYROID STIM HORMONE: CPT

## 2019-08-19 PROCEDURE — 87186 SC STD MICRODIL/AGAR DIL: CPT

## 2019-08-19 PROCEDURE — 85027 COMPLETE CBC AUTOMATED: CPT

## 2019-08-19 PROCEDURE — 83605 ASSAY OF LACTIC ACID: CPT

## 2019-08-19 PROCEDURE — 82746 ASSAY OF FOLIC ACID SERUM: CPT

## 2019-08-19 PROCEDURE — 99285 EMERGENCY DEPT VISIT HI MDM: CPT | Mod: 25

## 2019-08-19 PROCEDURE — 80053 COMPREHEN METABOLIC PANEL: CPT

## 2019-08-19 PROCEDURE — 84100 ASSAY OF PHOSPHORUS: CPT

## 2019-08-19 PROCEDURE — 82306 VITAMIN D 25 HYDROXY: CPT

## 2019-08-19 PROCEDURE — 82962 GLUCOSE BLOOD TEST: CPT

## 2019-08-19 PROCEDURE — 93005 ELECTROCARDIOGRAM TRACING: CPT

## 2019-08-19 PROCEDURE — 93923 UPR/LXTR ART STDY 3+ LVLS: CPT

## 2019-08-19 PROCEDURE — 85652 RBC SED RATE AUTOMATED: CPT

## 2019-08-19 PROCEDURE — 36415 COLL VENOUS BLD VENIPUNCTURE: CPT

## 2019-08-19 PROCEDURE — 87184 SC STD DISK METHOD PER PLATE: CPT

## 2019-08-19 PROCEDURE — 86140 C-REACTIVE PROTEIN: CPT

## 2019-08-19 PROCEDURE — 83036 HEMOGLOBIN GLYCOSYLATED A1C: CPT

## 2019-08-19 PROCEDURE — 83735 ASSAY OF MAGNESIUM: CPT

## 2019-08-19 PROCEDURE — 73660 X-RAY EXAM OF TOE(S): CPT

## 2019-08-19 PROCEDURE — 84134 ASSAY OF PREALBUMIN: CPT

## 2019-08-19 PROCEDURE — 87040 BLOOD CULTURE FOR BACTERIA: CPT

## 2019-08-19 PROCEDURE — 80048 BASIC METABOLIC PNL TOTAL CA: CPT

## 2019-08-19 PROCEDURE — 73562 X-RAY EXAM OF KNEE 3: CPT

## 2019-08-19 PROCEDURE — 87070 CULTURE OTHR SPECIMN AEROBIC: CPT

## 2019-08-19 RX ORDER — INSULIN GLARGINE 100 [IU]/ML
15 INJECTION, SOLUTION SUBCUTANEOUS
Qty: 5 | Refills: 0
Start: 2019-08-19 | End: 2019-09-17

## 2019-08-19 RX ORDER — INSULIN GLARGINE 100 [IU]/ML
15 INJECTION, SOLUTION SUBCUTANEOUS ONCE
Refills: 0 | Status: COMPLETED | OUTPATIENT
Start: 2019-08-19 | End: 2019-08-19

## 2019-08-19 RX ADMIN — Medication 2: at 17:27

## 2019-08-19 RX ADMIN — AMPICILLIN SODIUM AND SULBACTAM SODIUM 200 GRAM(S): 250; 125 INJECTION, POWDER, FOR SUSPENSION INTRAMUSCULAR; INTRAVENOUS at 17:27

## 2019-08-19 RX ADMIN — Medication 2: at 12:50

## 2019-08-19 RX ADMIN — INSULIN GLARGINE 15 UNIT(S): 100 INJECTION, SOLUTION SUBCUTANEOUS at 17:41

## 2019-08-19 RX ADMIN — AMPICILLIN SODIUM AND SULBACTAM SODIUM 200 GRAM(S): 250; 125 INJECTION, POWDER, FOR SUSPENSION INTRAMUSCULAR; INTRAVENOUS at 06:30

## 2019-08-19 NOTE — DISCHARGE NOTE PROVIDER - NSDCFUADDINST_GEN_ALL_CORE_FT
keep left foot clean, apply betadine soaked gauze and dry dressing over wound  follow up with podiatry outpt within 1 week

## 2019-08-19 NOTE — DISCHARGE NOTE PROVIDER - HOSPITAL COURSE
HPI: 38 y/o M came from Shelter with PMHx of DM non compliant with medications and PSx of Left 4th toe partial ray amputation due to infection last yr presents to the ED with complaints of R knee swelling x 1 week. Patient states he had itching on Rt knee, scab was formed and then little pus was draining on squeezing since 2 days. His swelling is progressively worsening and unable to walk. He is feeling hot but never checked his temperature. He has ulcer on left great toe but no pain. He was diagnosed with DM 5 years ago, he was treated initially with pills but not well controlled. Insulin 20 units was added. He was complaining of diarrhea x 3 times since he was started on insulin and pills. He stopped taking insulin and pills since last month due to worsening diarrhea. Pt is unable to recall his medications, Pharmacy and PMD. Denies fever, chills, trauma, Nausea, vomiting.        Patient was admitted to Medicine for Diabetic foot ulcer. He received Zosyn and Vancomycin in ED. ESR and CRP were elevated. Blood culture were sent and patient was started on Unasyn. MRI foot showed     IMPRESSION:    Osteomyelitis of the distal first phalanx. Fluid within the extensor     tendon sheath, which may represent infectious tenosynovitis.    Focus of susceptibility within the plantar soft tissues at the level of     the first web space, consistent with radiopaque foreign body.         Patient was seen and followed by Podiatry. No surgery was recommended due to uncontrolled diabetes with Hgba1c 14.1. Clinical status and treatment options were discussed with patient at length. It was recommended to continue IV antibiotics for 6 weeks but patient adamantly refused picc line and subacute rehab placement. Patient will be discharged on oral antibiotics to continue the course for 6 weeks.        Patient with poorly controlled DM, Hgba1c noted to be 14.1. Patient verbalized not taking Insulin at home due to diarrhea. Educated on medication compliance and need for strict outpatient follow up. Patient agreed to follow up with his PCP and Podiatry as discussed.

## 2019-08-19 NOTE — DISCHARGE NOTE PROVIDER - CARE PROVIDERS DIRECT ADDRESSES
,DirectAddress_Unknown ,DirectAddress_Unknown,[email or web address]

## 2019-08-19 NOTE — DISCHARGE NOTE PROVIDER - PROVIDER TOKENS
FREE:[LAST:[ho],FIRST:[aleida],PHONE:[phone]],FAX:[(   )    -],ADDRESS:[[address] [place] [address]],FOLLOWUP:[1 week]] FREE:[LAST:[ho],FIRST:[aleida],PHONE:[phone]],FAX:[(   )    -],ADDRESS:[[address] [place] [address]],FOLLOWUP:[1 week]],PROVIDER:[TOKEN:[[id number]],FOLLOWUP:[1-3 days]]

## 2019-08-19 NOTE — PROGRESS NOTE ADULT - SUBJECTIVE AND OBJECTIVE BOX
Patient is a 39y old  Male who presents with a chief complaint of right knee pain and left hallux ulceration    HPI: This 39 year old diabetic male presents to the ED with complaint of right knee pain with pus.  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  He states that he notice his hallux ulcer approximately 3 days ago, but did not do any treatment for it.  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  He denies trauma to the hallux.  He states when he had his previous amputation, the toe was red, swollen, and infected.  He states he stopped taking his diabetes medication and his sugar is high.  He denies n/v/d/f at this time  Pt was seen today by podiatry resting comfortably in bed. Pt denies any pain, F/N/V/C/SOB.    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 3.375 Gram(s) IV Intermittent Once  sodium chloride 0.9% Bolus 1000 milliLiter(s) IV Bolus once  vancomycin  IVPB 1000 milliGRAM(s) IV Intermittent once    FH:  PSX: No significant past surgical history    SH: piperacillin/tazobactam IVPB. 3.375 Gram(s) IV Intermittent Once  sodium chloride 0.9% Bolus 1000 milliLiter(s) IV Bolus once  vancomycin  IVPB 1000 milliGRAM(s) IV Intermittent once      Vital Signs Last 24 Hrs  T(C): 36.8 (17 Aug 2019 10:33), Max: 37.1 (17 Aug 2019 06:15)  T(F): 98.2 (17 Aug 2019 10:33), Max: 98.8 (17 Aug 2019 06:15)  HR: 75 (17 Aug 2019 10:33) (75 - 88)  BP: 111/66 (17 Aug 2019 10:33) (111/66 - 115/67)  BP(mean): --  RR: 16 (17 Aug 2019 10:33) (16 - 16)  SpO2: 98% (17 Aug 2019 10:33) (98% - 98%)    LABS                        11.9   8.87  )-----------( 291      ( 17 Aug 2019 08:37 )             35.5               08-17    135  |  100  |  18  ----------------------------<  301<H>  4.7   |  33<H>  |  1.34<H>    Ca    8.9      17 Aug 2019 08:37    TPro  7.5  /  Alb  2.8<L>  /  TBili  0.5  /  DBili  x   /  AST  13  /  ALT  15  /  AlkPhos  83  08-17        PHYSICAL EXAM  GEN: COLE FERNANDEZ is a pleasant well-nourished, well developed 39y Male in no acute distress, alert awake, and oriented to person, place and time.   LE Focused:    Vasc:  DP and PT pulses palpable.  Unable to assess CFT to left hallux. CFT brisk to all other digits. Mild swelling of left hallux.    Derm: Left hallux ulceration of distal aspect measuring approxately 2.5cm x 2.5cm x 0.2cm down to the level of fascia.  Granular base with hyperkeratotic rim.  No malodor, no purulence, no probe to bone, mild periwound erythema.    Neuro: Diminished protective sensation b/l  MSK: No gross structural abnormalities    Imaging: xray taken-pending  Cultures: taken pending    A: Pressure ulcer left hallux      Osteomyelitis of left hallux distal phalanx    P:  Patient evaluated, chart reviewed  Xrays reviewed   Wound culture pending  Consult ID placed.  MRI of left foot reviewed OM of distal phalanx left hallux and foreign body first interspace left foot   Recommend antibiotics per ID or primary team  Podiatry will follow while in house  Dress with 4x4 gauze, betadine kenneth.  Discussed and seen  with Attending Dr. Mackay.

## 2019-08-19 NOTE — CHART NOTE - NSCHARTNOTEFT_GEN_A_CORE
Patient is a 39y old  Male who presents with a chief complaint of Right knee swelling (19 Aug 2019 11:57)      INTERVAL HPI/OVERNIGHT EVENTS: no acute events overnight.         REVIEW OF SYSTEMS:  CONSTITUTIONAL: No fever, chills  ENMT:  No difficulty hearing, no change in vision  NECK: No pain or stiffness  RESPIRATORY: No cough, SOB  CARDIOVASCULAR: No chest pain, palpitations  GASTROINTESTINAL: No abdominal pain. No nausea, vomiting, or diarrhea  GENITOURINARY: No dysuria  NEUROLOGICAL: No HA  SKIN: left foot ulcer   MUSCULOSKELETAL: No joint pain or swelling; No muscle, back, or extremity pain      T(C): 37.1 (08-19-19 @ 08:01), Max: 37.1 (08-19-19 @ 08:01)  HR: 78 (08-19-19 @ 08:01) (78 - 84)  BP: 110/62 (08-19-19 @ 08:01) (110/62 - 138/74)  RR: 16 (08-19-19 @ 08:01) (16 - 18)  SpO2: 100% (08-19-19 @ 08:01) (93% - 100%)  Wt(kg): --Vital Signs Last 24 Hrs  T(C): 37.1 (19 Aug 2019 08:01), Max: 37.1 (19 Aug 2019 08:01)  T(F): 98.7 (19 Aug 2019 08:01), Max: 98.7 (19 Aug 2019 08:01)  HR: 78 (19 Aug 2019 08:01) (78 - 84)  BP: 110/62 (19 Aug 2019 08:01) (110/62 - 138/74)  BP(mean): --  RR: 16 (19 Aug 2019 08:01) (16 - 18)  SpO2: 100% (19 Aug 2019 08:01) (93% - 100%)    MEDICATIONS  (STANDING):  ampicillin/sulbactam  IVPB 3 Gram(s) IV Intermittent every 12 hours  ampicillin/sulbactam  IVPB      dextrose 5%. 1000 milliLiter(s) (50 mL/Hr) IV Continuous <Continuous>  heparin  Injectable 5000 Unit(s) SubCutaneous every 8 hours  insulin glargine Injectable (LANTUS) 15 Unit(s) SubCutaneous at bedtime  insulin lispro (HumaLOG) corrective regimen sliding scale   SubCutaneous three times a day before meals    MEDICATIONS  (PRN):  acetaminophen   Tablet .. 650 milliGRAM(s) Oral every 6 hours PRN Mild Pain (1 - 3)  dextrose 40% Gel 15 Gram(s) Oral once PRN Blood Glucose LESS THAN 70 milliGRAM(s)/deciLiter  glucagon  Injectable 1 milliGRAM(s) IntraMuscular once PRN Glucose <70 milliGRAM(s)/deciLiter      PHYSICAL EXAM:  GENERAL: NAD  EYES: clear conjunctiva; EOMI  ENMT: Moist mucous membranes  NECK: Supple, No JVD, Normal thyroid  CHEST/LUNG: Clear to auscultation bilaterally; No rales, rhonchi, wheezing, or rubs  HEART: S1, S2, Regular rate and rhythm  ABDOMEN: Soft, Nontender, Nondistended; Bowel sounds present  NEURO: Alert & Oriented X3  EXTREMITIES: +ve left foot ulcer with       Consultant(s) Notes Reviewed:  [x ] YES  [ ] NO  Care Discussed with Consultants/Other Providers [ x] YES  [ ] NO    LABS:                        11.5   7.61  )-----------( 351      ( 19 Aug 2019 06:48 )             35.6     08-19    138  |  102  |  11  ----------------------------<  158<H>  4.0   |  30  |  1.08    Ca    8.8      19 Aug 2019 06:48  Phos  2.9     08-18  Mg     2.3     08-18          CAPILLARY BLOOD GLUCOSE      POCT Blood Glucose.: 221 mg/dL (19 Aug 2019 12:25)  POCT Blood Glucose.: 120 mg/dL (19 Aug 2019 08:28)  POCT Blood Glucose.: 213 mg/dL (18 Aug 2019 21:30)  POCT Blood Glucose.: 301 mg/dL (18 Aug 2019 16:46)    RADIOLOGY & ADDITIONAL TESTS:    < from: MR Foot No Cont, Left (08.17.19 @ 12:01) >        < end of copied text >        Imaging Personally Reviewed:  [ ] YES  [ ] NO    A/P  40 y/o M came from Shelter with PMHx of DM non compliant with medications and PSx of Rt 4 th toe due to infection last yr presents to the ED with complaints of R knee swelling x 1 week.  admitted for osteomyelitis sec to diabetic foot ulcer   -pt not a surgical candidate right now due to poorly controlled DM as per podiatry   -6 weeks of IV antibiotics recommended, pt adamantly refusing PICC line and rehab placement, clinical status, treatment options with risks and benefits discussed with pt with good understanding, pt will be discharged home on PO abx with outpt follow up with Podiatry   -above d/w dr. Cortez and podiatry     A/P Patient is a 39y old  Male who presents with a chief complaint of Right knee swelling (19 Aug 2019 11:57)      INTERVAL HPI/OVERNIGHT EVENTS: no acute events overnight.         REVIEW OF SYSTEMS:  CONSTITUTIONAL: No fever, chills  ENMT:  No difficulty hearing, no change in vision  NECK: No pain or stiffness  RESPIRATORY: No cough, SOB  CARDIOVASCULAR: No chest pain, palpitations  GASTROINTESTINAL: No abdominal pain. No nausea, vomiting, or diarrhea  GENITOURINARY: No dysuria  NEUROLOGICAL: No HA  SKIN: left foot ulcer   MUSCULOSKELETAL: No joint pain or swelling; No muscle, back, or extremity pain      T(C): 37.1 (08-19-19 @ 08:01), Max: 37.1 (08-19-19 @ 08:01)  HR: 78 (08-19-19 @ 08:01) (78 - 84)  BP: 110/62 (08-19-19 @ 08:01) (110/62 - 138/74)  RR: 16 (08-19-19 @ 08:01) (16 - 18)  SpO2: 100% (08-19-19 @ 08:01) (93% - 100%)  Wt(kg): --Vital Signs Last 24 Hrs  T(C): 37.1 (19 Aug 2019 08:01), Max: 37.1 (19 Aug 2019 08:01)  T(F): 98.7 (19 Aug 2019 08:01), Max: 98.7 (19 Aug 2019 08:01)  HR: 78 (19 Aug 2019 08:01) (78 - 84)  BP: 110/62 (19 Aug 2019 08:01) (110/62 - 138/74)  BP(mean): --  RR: 16 (19 Aug 2019 08:01) (16 - 18)  SpO2: 100% (19 Aug 2019 08:01) (93% - 100%)    MEDICATIONS  (STANDING):  ampicillin/sulbactam  IVPB 3 Gram(s) IV Intermittent every 12 hours  ampicillin/sulbactam  IVPB      dextrose 5%. 1000 milliLiter(s) (50 mL/Hr) IV Continuous <Continuous>  heparin  Injectable 5000 Unit(s) SubCutaneous every 8 hours  insulin glargine Injectable (LANTUS) 15 Unit(s) SubCutaneous at bedtime  insulin lispro (HumaLOG) corrective regimen sliding scale   SubCutaneous three times a day before meals    MEDICATIONS  (PRN):  acetaminophen   Tablet .. 650 milliGRAM(s) Oral every 6 hours PRN Mild Pain (1 - 3)  dextrose 40% Gel 15 Gram(s) Oral once PRN Blood Glucose LESS THAN 70 milliGRAM(s)/deciLiter  glucagon  Injectable 1 milliGRAM(s) IntraMuscular once PRN Glucose <70 milliGRAM(s)/deciLiter      PHYSICAL EXAM:  GENERAL: NAD  EYES: clear conjunctiva; EOMI  ENMT: Moist mucous membranes  NECK: Supple, No JVD, Normal thyroid  CHEST/LUNG: Clear to auscultation bilaterally; No rales, rhonchi, wheezing, or rubs  HEART: S1, S2, Regular rate and rhythm  ABDOMEN: Soft, Nontender, Nondistended; Bowel sounds present  NEURO: Alert & Oriented X3  EXTREMITIES: +ve left foot ulcer with       Consultant(s) Notes Reviewed:  [x ] YES  [ ] NO  Care Discussed with Consultants/Other Providers [ x] YES  [ ] NO    LABS:                        11.5   7.61  )-----------( 351      ( 19 Aug 2019 06:48 )             35.6     08-19    138  |  102  |  11  ----------------------------<  158<H>  4.0   |  30  |  1.08    Ca    8.8      19 Aug 2019 06:48  Phos  2.9     08-18  Mg     2.3     08-18          CAPILLARY BLOOD GLUCOSE      POCT Blood Glucose.: 221 mg/dL (19 Aug 2019 12:25)  POCT Blood Glucose.: 120 mg/dL (19 Aug 2019 08:28)  POCT Blood Glucose.: 213 mg/dL (18 Aug 2019 21:30)  POCT Blood Glucose.: 301 mg/dL (18 Aug 2019 16:46)    RADIOLOGY & ADDITIONAL TESTS:    < from: MR Foot No Cont, Left (08.17.19 @ 12:01) >        < end of copied text >        Imaging Personally Reviewed:  [ ] YES  [ ] NO    A/P  40 y/o M came from Shelter with PMHx of DM non compliant with medications and PSx of Rt 4 th toe due to infection last yr presents to the ED with complaints of R knee swelling x 1 week.  admitted for osteomyelitis sec to diabetic foot ulcer   -pt not a surgical candidate right now due to poorly controlled DM as per podiatry   -6 weeks of IV antibiotics recommended, pt adamantly refusing PICC line and rehab placement, clinical status, treatment options with risks and benefits discussed with pt with good understanding, pt will be discharged home on PO abx with outpt follow up with Podiatry   -above d/w dr. Cortez and podiatry

## 2019-08-19 NOTE — DISCHARGE NOTE PROVIDER - CARE PROVIDER_API CALL
aleida zelaya  [address] [place] [address]  Phone: [phone]  Fax: (   )    -  Follow Up Time: 1 week aleida zelaya  [address] [place] [address]  Phone: [phone]  Fax: (   )    -  Follow Up Time: 1 week    Pepito Mackay (DPM)  Foot Surgery; [facility]ear[facility]  [address] [place] [address]  Phone: [phone]  Fax: [phone]  Follow Up Time: 1-3 days

## 2019-08-19 NOTE — DISCHARGE NOTE NURSING/CASE MANAGEMENT/SOCIAL WORK - NSDCDPATPORTLINK_GEN_ALL_CORE
You can access the [facility] Patient Portal, offered by [facility], by registering with the following website: [email or web address]

## 2019-08-19 NOTE — DISCHARGE NOTE PROVIDER - NSDCCPCAREPLAN_GEN_ALL_CORE_FT
PRINCIPAL DISCHARGE DIAGNOSIS  Diagnosis: Diabetic foot ulcer  Assessment and Plan of Treatment: You were admitted for ulcer on your left great toe due to diabetes. Your diabtes is uncontolled, your Hemogbin a1c this visit was 14.1. You were seen while in the hospital by the podiatrist who monitored and took care of the ulcer on your left great toe. Continue to take the antibiotics as prescribed and complete the treatment to prevent an infection. This will help to assist in healing the foot ulcer. Follow up with your PCP and Podiatry after discharge.      SECONDARY DISCHARGE DIAGNOSES  Diagnosis: DM (diabetes mellitus)  Assessment and Plan of Treatment: While in the hospital. you were started on Lantus 15 units and your blood sugar was checked before meals and at bedtime. You received Insulin according to your blood sugar level. Take your diabetic medications as prescribed, eat consistent carbohydrate diet and follow up with your PCP regularly.    Diagnosis: Knee swelling  Assessment and Plan of Treatment: The xray of your right knee did not see any fracure or dislocation. The swelling has been resolved.    Diagnosis: Cellulitis  Assessment and Plan of Treatment: You had an inflammation which you were given antibiotics. Continue to take your antibiotics until you complete the full course of treatment. PRINCIPAL DISCHARGE DIAGNOSIS  Diagnosis: Diabetic foot ulcer  Assessment and Plan of Treatment: You were admitted for ulcer on your left great toe due to diabetes. Your diabtes is uncontolled, your Hemogbin a1c this visit was 14.1. You were seen while in the hospital by the podiatrist who monitored and took care of the ulcer on your left great toe. Continue to take the antibiotics as prescribed and complete the treatment to prevent an infection. This will help to assist in healing the foot ulcer. Follow up with your PCP and Podiatry after discharge.      SECONDARY DISCHARGE DIAGNOSES  Diagnosis: Knee swelling  Assessment and Plan of Treatment: The xray of your right knee did not see any fracure or dislocation. The swelling has been resolved.    Diagnosis: DM (diabetes mellitus)  Assessment and Plan of Treatment: While in the hospital. you were started on Lantus 15 units and your blood sugar was checked before meals and at bedtime. You received Insulin according to your blood sugar level. Take your diabetic medications as prescribed, eat consistent carbohydrate diet and follow up with your PCP regularly.    Diagnosis: Cellulitis  Assessment and Plan of Treatment: You had an inflammation which you were given antibiotics. Continue to take your antibiotics until you complete the full course of treatment. PRINCIPAL DISCHARGE DIAGNOSIS  Diagnosis: Diabetic foot ulcer  Assessment and Plan of Treatment: You were admitted for ulcer on your left great toe due to diabetes. Your diabtes is uncontolled, your Hemogbin a1c this visit was 14.1. MRI of your foot showed that you have osteomyelitis which is infection of bone, you received IV antibiotics You were seen while in the hospital by the podiatrist who monitored and took care of the ulcer on your left great toe. Continue to take the antibiotics as prescribed and complete the treatment to prevent an infection. This will help to assist in healing the foot ulcer. Follow up with your PCP and Podiatry after discharge.        SECONDARY DISCHARGE DIAGNOSES  Diagnosis: Knee swelling  Assessment and Plan of Treatment: The xray of your right knee did not see any fracure or dislocation. The swelling has been resolved.    Diagnosis: DM (diabetes mellitus)  Assessment and Plan of Treatment: While in the hospital. you were started on Lantus 15 units and your blood sugar was checked before meals and at bedtime. You received Insulin according to your blood sugar level. Take your diabetic medications as prescribed, eat consistent carbohydrate diet and follow up with your PCP regularly.  HgA1C this admission. 14.1  Make sure you get your HgA1c checked every three months.  If you take oral diabetes medications, check your blood glucose two times a day.  If you take insulin, check your blood glucose before meals and at bedtime.  It's important not to skip any meals.  Keep a log of your blood glucose results and always take it with you to your doctor appointments.  Keep a list of your current medications including injectables and over the counter medications and bring this medication list with you to all your doctor appointments.  If you have not seen your ophthalmologist this year call for appointment.  Check your feet daily for redness, sores, or openings. Do not self treat. If no improvement in two days call your primary care physician for an appointment.  Low blood sugar (hypoglycemia) is a blood sugar below 70mg/dl. Check your blood sugar if you feel signs/symptoms of hypoglycemia. If your blood sugar is below 70 take 15 grams of carbohydrates (ex 4 oz of apple juice, 3-4 glucose tablets, or 4-6 oz of regular soda) wait 15 minutes and repeat blood sugar to make sure it comes up above 70.  If your blood sugar is above 70 and you are due for a meal, have a meal.  If you are not due for a meal have a snack.  This snack helps keeps your blood sugar at a safe range.      Diagnosis: Cellulitis  Assessment and Plan of Treatment: You had an inflammation which you were given antibiotics. Continue to take your antibiotics until you complete the full course of treatment.

## 2019-08-22 LAB
CULTURE RESULTS: SIGNIFICANT CHANGE UP
CULTURE RESULTS: SIGNIFICANT CHANGE UP
SPECIMEN SOURCE: SIGNIFICANT CHANGE UP
SPECIMEN SOURCE: SIGNIFICANT CHANGE UP

## 2019-09-01 ENCOUNTER — OUTPATIENT (OUTPATIENT)
Dept: OUTPATIENT SERVICES | Facility: HOSPITAL | Age: 39
LOS: 1 days | End: 2019-09-01
Payer: MEDICAID

## 2019-09-01 DIAGNOSIS — Z89.422 ACQUIRED ABSENCE OF OTHER LEFT TOE(S): Chronic | ICD-10-CM

## 2019-09-01 PROCEDURE — G9001: CPT

## 2019-09-08 ENCOUNTER — INPATIENT (INPATIENT)
Facility: HOSPITAL | Age: 39
LOS: 14 days | Discharge: NURSING, REHAB OR LONG-TERM CARE | End: 2019-09-23
Attending: INTERNAL MEDICINE | Admitting: INTERNAL MEDICINE
Payer: MEDICAID

## 2019-09-08 VITALS
DIASTOLIC BLOOD PRESSURE: 60 MMHG | SYSTOLIC BLOOD PRESSURE: 125 MMHG | TEMPERATURE: 98 F | OXYGEN SATURATION: 100 % | RESPIRATION RATE: 16 BRPM | HEART RATE: 99 BPM

## 2019-09-08 DIAGNOSIS — Z89.422 ACQUIRED ABSENCE OF OTHER LEFT TOE(S): Chronic | ICD-10-CM

## 2019-09-09 DIAGNOSIS — Z29.9 ENCOUNTER FOR PROPHYLACTIC MEASURES, UNSPECIFIED: ICD-10-CM

## 2019-09-09 DIAGNOSIS — M86.172 OTHER ACUTE OSTEOMYELITIS, LEFT ANKLE AND FOOT: ICD-10-CM

## 2019-09-09 DIAGNOSIS — S98.132A COMPLETE TRAUMATIC AMPUTATION OF ONE LEFT LESSER TOE, INITIAL ENCOUNTER: Chronic | ICD-10-CM

## 2019-09-09 DIAGNOSIS — E11.9 TYPE 2 DIABETES MELLITUS WITHOUT COMPLICATIONS: ICD-10-CM

## 2019-09-09 DIAGNOSIS — I77.1 STRICTURE OF ARTERY: ICD-10-CM

## 2019-09-09 LAB
ALBUMIN SERPL ELPH-MCNC: 3 G/DL — LOW (ref 3.3–5)
ALP SERPL-CCNC: 58 U/L — SIGNIFICANT CHANGE UP (ref 40–120)
ALT FLD-CCNC: 14 U/L — SIGNIFICANT CHANGE UP (ref 4–41)
ANION GAP SERPL CALC-SCNC: 13 MMO/L — SIGNIFICANT CHANGE UP (ref 7–14)
APTT BLD: 34.6 SEC — SIGNIFICANT CHANGE UP (ref 27.5–36.3)
AST SERPL-CCNC: 15 U/L — SIGNIFICANT CHANGE UP (ref 4–40)
BASOPHILS # BLD AUTO: 0.05 K/UL — SIGNIFICANT CHANGE UP (ref 0–0.2)
BASOPHILS NFR BLD AUTO: 0.5 % — SIGNIFICANT CHANGE UP (ref 0–2)
BILIRUB SERPL-MCNC: 0.4 MG/DL — SIGNIFICANT CHANGE UP (ref 0.2–1.2)
BUN SERPL-MCNC: 18 MG/DL — SIGNIFICANT CHANGE UP (ref 7–23)
CALCIUM SERPL-MCNC: 8.6 MG/DL — SIGNIFICANT CHANGE UP (ref 8.4–10.5)
CHLORIDE SERPL-SCNC: 98 MMOL/L — SIGNIFICANT CHANGE UP (ref 98–107)
CO2 SERPL-SCNC: 24 MMOL/L — SIGNIFICANT CHANGE UP (ref 22–31)
CREAT SERPL-MCNC: 1.25 MG/DL — SIGNIFICANT CHANGE UP (ref 0.5–1.3)
CRP SERPL-MCNC: 141.1 MG/L — HIGH
EOSINOPHIL # BLD AUTO: 0.16 K/UL — SIGNIFICANT CHANGE UP (ref 0–0.5)
EOSINOPHIL NFR BLD AUTO: 1.5 % — SIGNIFICANT CHANGE UP (ref 0–6)
ERYTHROCYTE [SEDIMENTATION RATE] IN BLOOD: 109 MM/HR — HIGH (ref 1–15)
GLUCOSE SERPL-MCNC: 184 MG/DL — HIGH (ref 70–99)
GRAM STN WND: SIGNIFICANT CHANGE UP
HCT VFR BLD CALC: 27.4 % — LOW (ref 39–50)
HGB BLD-MCNC: 8.7 G/DL — LOW (ref 13–17)
IMM GRANULOCYTES NFR BLD AUTO: 0.4 % — SIGNIFICANT CHANGE UP (ref 0–1.5)
INR BLD: 1.36 — HIGH (ref 0.88–1.17)
LYMPHOCYTES # BLD AUTO: 1.81 K/UL — SIGNIFICANT CHANGE UP (ref 1–3.3)
LYMPHOCYTES # BLD AUTO: 16.7 % — SIGNIFICANT CHANGE UP (ref 13–44)
MCHC RBC-ENTMCNC: 27.3 PG — SIGNIFICANT CHANGE UP (ref 27–34)
MCHC RBC-ENTMCNC: 31.8 % — LOW (ref 32–36)
MCV RBC AUTO: 85.9 FL — SIGNIFICANT CHANGE UP (ref 80–100)
MONOCYTES # BLD AUTO: 1.13 K/UL — HIGH (ref 0–0.9)
MONOCYTES NFR BLD AUTO: 10.5 % — SIGNIFICANT CHANGE UP (ref 2–14)
NEUTROPHILS # BLD AUTO: 7.62 K/UL — HIGH (ref 1.8–7.4)
NEUTROPHILS NFR BLD AUTO: 70.4 % — SIGNIFICANT CHANGE UP (ref 43–77)
NRBC # FLD: 0 K/UL — SIGNIFICANT CHANGE UP (ref 0–0)
PLATELET # BLD AUTO: 370 K/UL — SIGNIFICANT CHANGE UP (ref 150–400)
PMV BLD: 9.2 FL — SIGNIFICANT CHANGE UP (ref 7–13)
POTASSIUM SERPL-MCNC: 4.3 MMOL/L — SIGNIFICANT CHANGE UP (ref 3.5–5.3)
POTASSIUM SERPL-SCNC: 4.3 MMOL/L — SIGNIFICANT CHANGE UP (ref 3.5–5.3)
PROT SERPL-MCNC: 7.2 G/DL — SIGNIFICANT CHANGE UP (ref 6–8.3)
PROTHROM AB SERPL-ACNC: 15.7 SEC — HIGH (ref 9.8–13.1)
RBC # BLD: 3.19 M/UL — LOW (ref 4.2–5.8)
RBC # FLD: 12.8 % — SIGNIFICANT CHANGE UP (ref 10.3–14.5)
SODIUM SERPL-SCNC: 135 MMOL/L — SIGNIFICANT CHANGE UP (ref 135–145)
SPECIMEN SOURCE: SIGNIFICANT CHANGE UP
WBC # BLD: 10.81 K/UL — HIGH (ref 3.8–10.5)
WBC # FLD AUTO: 10.81 K/UL — HIGH (ref 3.8–10.5)

## 2019-09-09 PROCEDURE — 99223 1ST HOSP IP/OBS HIGH 75: CPT | Mod: GC

## 2019-09-09 PROCEDURE — 73630 X-RAY EXAM OF FOOT: CPT | Mod: 26,LT

## 2019-09-09 PROCEDURE — 93923 UPR/LXTR ART STDY 3+ LVLS: CPT | Mod: 26

## 2019-09-09 PROCEDURE — 99221 1ST HOSP IP/OBS SF/LOW 40: CPT

## 2019-09-09 RX ORDER — DEXTROSE 50 % IN WATER 50 %
12.5 SYRINGE (ML) INTRAVENOUS ONCE
Refills: 0 | Status: DISCONTINUED | OUTPATIENT
Start: 2019-09-09 | End: 2019-09-12

## 2019-09-09 RX ORDER — DEXTROSE 50 % IN WATER 50 %
25 SYRINGE (ML) INTRAVENOUS ONCE
Refills: 0 | Status: DISCONTINUED | OUTPATIENT
Start: 2019-09-09 | End: 2019-09-12

## 2019-09-09 RX ORDER — INSULIN LISPRO 100/ML
VIAL (ML) SUBCUTANEOUS AT BEDTIME
Refills: 0 | Status: DISCONTINUED | OUTPATIENT
Start: 2019-09-09 | End: 2019-09-12

## 2019-09-09 RX ORDER — INSULIN LISPRO 100/ML
VIAL (ML) SUBCUTANEOUS
Refills: 0 | Status: DISCONTINUED | OUTPATIENT
Start: 2019-09-09 | End: 2019-09-12

## 2019-09-09 RX ORDER — VANCOMYCIN HCL 1 G
1000 VIAL (EA) INTRAVENOUS EVERY 12 HOURS
Refills: 0 | Status: DISCONTINUED | OUTPATIENT
Start: 2019-09-09 | End: 2019-09-12

## 2019-09-09 RX ORDER — VANCOMYCIN HCL 1 G
1000 VIAL (EA) INTRAVENOUS ONCE
Refills: 0 | Status: COMPLETED | OUTPATIENT
Start: 2019-09-09 | End: 2019-09-09

## 2019-09-09 RX ORDER — SODIUM CHLORIDE 9 MG/ML
1000 INJECTION, SOLUTION INTRAVENOUS
Refills: 0 | Status: DISCONTINUED | OUTPATIENT
Start: 2019-09-09 | End: 2019-09-12

## 2019-09-09 RX ORDER — GLUCAGON INJECTION, SOLUTION 0.5 MG/.1ML
1 INJECTION, SOLUTION SUBCUTANEOUS ONCE
Refills: 0 | Status: DISCONTINUED | OUTPATIENT
Start: 2019-09-09 | End: 2019-09-12

## 2019-09-09 RX ORDER — DEXTROSE 50 % IN WATER 50 %
15 SYRINGE (ML) INTRAVENOUS ONCE
Refills: 0 | Status: DISCONTINUED | OUTPATIENT
Start: 2019-09-09 | End: 2019-09-12

## 2019-09-09 RX ORDER — PIPERACILLIN AND TAZOBACTAM 4; .5 G/20ML; G/20ML
3.38 INJECTION, POWDER, LYOPHILIZED, FOR SOLUTION INTRAVENOUS EVERY 8 HOURS
Refills: 0 | Status: DISCONTINUED | OUTPATIENT
Start: 2019-09-09 | End: 2019-09-10

## 2019-09-09 RX ORDER — PIPERACILLIN AND TAZOBACTAM 4; .5 G/20ML; G/20ML
3.38 INJECTION, POWDER, LYOPHILIZED, FOR SOLUTION INTRAVENOUS ONCE
Refills: 0 | Status: COMPLETED | OUTPATIENT
Start: 2019-09-09 | End: 2019-09-09

## 2019-09-09 RX ADMIN — Medication 250 MILLIGRAM(S): at 20:01

## 2019-09-09 RX ADMIN — PIPERACILLIN AND TAZOBACTAM 200 GRAM(S): 4; .5 INJECTION, POWDER, LYOPHILIZED, FOR SOLUTION INTRAVENOUS at 07:01

## 2019-09-09 RX ADMIN — PIPERACILLIN AND TAZOBACTAM 25 GRAM(S): 4; .5 INJECTION, POWDER, LYOPHILIZED, FOR SOLUTION INTRAVENOUS at 16:10

## 2019-09-09 RX ADMIN — Medication 2: at 18:29

## 2019-09-09 RX ADMIN — PIPERACILLIN AND TAZOBACTAM 25 GRAM(S): 4; .5 INJECTION, POWDER, LYOPHILIZED, FOR SOLUTION INTRAVENOUS at 23:15

## 2019-09-09 RX ADMIN — Medication 250 MILLIGRAM(S): at 05:52

## 2019-09-09 NOTE — H&P ADULT - NSHPPHYSICALEXAM_GEN_ALL_CORE
Vital Signs Last 24 Hrs  T(C): 37.4 (09 Sep 2019 04:58), Max: 37.4 (09 Sep 2019 04:58)  T(F): 99.4 (09 Sep 2019 04:58), Max: 99.4 (09 Sep 2019 04:58)  HR: 80 (09 Sep 2019 04:58) (80 - 99)  BP: 111/54 (09 Sep 2019 04:58) (111/54 - 125/60)  BP(mean): --  RR: 16 (09 Sep 2019 04:58) (16 - 16)  SpO2: 100% (09 Sep 2019 04:58) (100% - 100%)    General: Well-nourished, well-developed, NAD  Head: Normocephalic, Atraumatic  Eyes: PERRLA, EOMI, normal sclera  Throat: Moist mucous membranes  Respiratory: CTAB, normal respiratory effort, no wheezes, crackles, rales  CV: RRR, S1/S2, no murmurs, rubs or gallops  Abdominal: Soft, bowel sounds present, NT, ND +BS  Extremities: No edema, 2+ DP pulses, left toe with open wound exposing subcutaneous tissue, crusty, malodorous, no bone seen, swelling and erythema or plantar foot.  Neurological: A&Ox4, MAEx4, non-focal  Skin: No rashes

## 2019-09-09 NOTE — H&P ADULT - HISTORY OF PRESENT ILLNESS
39 M with PMH DM2 on insulin who presents with L foot pain and swelling. Patient says he went to [facility] last week for R knee pain and was admitted for and infection of his left toe. He states he was admitted for 3 days and given antibiotics and was supposed to go to rehab for IV antibiotics but then was sent home with oral antibiotics instead. He was told to not wear shoes but he went back to work and was wearing sneakers. He was having worsening pain and swelling in the leg which is why he came to the ED. Denies fevers or chills but states there has been a foul odor and discharge from his Left big toe and second toe. Has previous amputation on L foot. 39 year old male with T2DM and history of left toe amputation (December 2018) presents with complaint of left foot pain and swelling. One week ago the patient was admitted at [facility] and was treated for fever and right knee inflammation and left foot swelling. He was treated with antibiotics and discharged on Augmentin 875 mg BID for 6 weeks. The patient was instructed to stay off his feet and not wear shoes, but he did not listen to this advice. He wore sneakers and went to work. His foot swelling began to worsen after this with a foul smelling odor. The patient came to the ED. He denies fevers, chills, chest pain, shortness of breath, nausea or vomiting.    Of note, the patient was diagnosed with diabetes around age 13-14. He was taking oral medication and insulin up until last month when he started experiencing urinary frequency He attributed this to one of the medications, but was not sure which it was so stopped both medications. He resumed the insulin only after his recent discharge from [facility]. He uses an insulin pen at15 units nightly. He lives in a shelter and can afford his medications.     In the ED, his vital signs were stable.  He received 1g vancomycin x1 and zosyn x1.

## 2019-09-09 NOTE — H&P ADULT - PROBLEM SELECTOR PLAN 3
- DVT: SCDs, low Improve score  - Diet: Consistent carbohydrate - DVT: SCDs, low Improve score  - Diet: Consistent carbohydrate    Tori Constantino MD, PGY-2  [id number]

## 2019-09-09 NOTE — ED PROVIDER NOTE - CLINICAL SUMMARY MEDICAL DECISION MAKING FREE TEXT BOX
39 M with DM on insulin presenting with L foot swelling and ulcer of L toe, concern for osteomyelitis, ordered labs, xray, podiatry consult. Likely admit

## 2019-09-09 NOTE — CONSULT NOTE ADULT - ASSESSMENT
Assessment/Plan: 39M with likely acute worsening of chronic LLE hallux wound. Palpable pulses with likely small vessel disease 2/2 DM.    1. IV ABX given in ED. Continue  2. Pain control  3. Would obtain DAGMAR/PVR to evaluate for additional vessel disease. Likely to be grossly normal, though given palpable pulses.  4. f/u Pods OR plan  5. No acute vascular intervention warranted at this time 2/2 pulse exam and brisk bleeding from wound.   6. DM control  7. f/u Cultures taken by Pods  8. Will follow along with you  9. Discussed with vascular fellow and attending.

## 2019-09-09 NOTE — H&P ADULT - NSHPREVIEWOFSYSTEMS_GEN_ALL_CORE
REVIEW OF SYSTEMS:  CONSTITUTIONAL: No fever, chills, night sweats, or fatigue  EYES: No eye pain, visual disturbances, or discharge  ENT:  No difficulty hearing, tinnitus, vertigo; No sinus or throat pain  NECK: No pain or stiffness  RESPIRATORY: No cough, wheezing, or hemoptysis  CARDIOVASCULAR: No chest pain, palpitations, dizziness, or leg swelling  GASTROINTESTINAL: No abdominal or epigastric pain. No nausea, vomiting, or hematemesis  GENITOURINARY: No dysuria, frequency, hematuria, or incontinence  NEUROLOGICAL: No headaches, memory loss, loss of strength, numbness, or tremors  SKIN: No itching, burning, rashes, or lesions   LYMPH NODES: No enlarged glands  ENDOCRINE: No hot or cold intolerance; No hair loss  MUSCULOSKELETAL: No joint pain or swelling; No muscle, back, or extremity pain  PSYCHIATRIC: No depression, anxiety, mood swings, or difficulty sleeping  HEME/LYMPH: No easy bruising, or bleeding gums  ALLERGY AND IMMUNOLOGIC: No hives or eczema

## 2019-09-09 NOTE — ED PROVIDER NOTE - OBJECTIVE STATEMENT
39 M with PMH DM2 on insulin who presents with L foot pain and swelling. Patient says he went to [facility] last week for R knee pain and was admitted for and infection of his left toe. He states he was admitted for 3 days and given antibiotics and was supposed to go to rehab for IV antibiotics but then was sent home with oral antibiotics instead. He was told to not wear shoes but he went back to work and was wearing sneakers. He was having worsening pain and swelling in the leg which is why he came to the ED. Denies fevers or chills but states there has been a foul odor and discharge from his Left big toe and second toe. Has previous amputation on L foot.

## 2019-09-09 NOTE — ED PROVIDER NOTE - ATTENDING CONTRIBUTION TO CARE
MD Contreras:  I performed a face to face bedside interview with patient regarding history of present illness, review of symptoms and past medical history. I completed an independent physical exam(documented below).  I have discussed patient's plan of care with resident.   I agree with note as stated above, having amended the EMR as needed to reflect my findings. I have discussed the assessment and plan of care.  This includes during the time I functioned as the attending physician for this patient.  PE:  Gen: Alert, NAD  Head: NC, AT,  EOMI, normal lids/conjunctiva  ENT:  normal hearing, patent oropharynx without erythema/exudate  Neck: +supple, no tenderness/meningismus/JVD, +Trachea midline  Chest: no chest wall tenderness, equal chest rise  Pulm: Bilateral BS, normal resp effort, no wheeze/stridor/retractions  CV: RRR, no M/R/G, +dist pulses  Abd: +BS, soft, NT/ND  Rectal: deferred  Mskel: L foot w/ open wound 1st and 2nd toes, w/ surrounding erythema/edema, +malodorous  Skin: no rash  Neuro: AAOx3, diminished sensation L 1st/2nd toes  MDM:  40yo M w/ pmh of IDDM and diabetic foot ulcers, s/p recent admission at [facility] for ?osteo, treated w/ IV abx before being dc'd on po abx with instructions to not wear shoes, but pt did not comply (states he wore shoes because 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 Suspect worsening osteo/wet gangrene. Labs, xrays, abx, podiatry consult.

## 2019-09-09 NOTE — CONSULT NOTE ADULT - SUBJECTIVE AND OBJECTIVE BOX
Patient is a 39y old  Male who presents with a chief complaint of left foot swelling    HPI: 39 M with PMH DM2 on insulin who presents with L foot pain and swelling. Patient says he went to [facility] last week for R knee pain and was admitted for and infection of his left toe. He states he was admitted for 3 days and given antibiotics and was supposed to go to rehab for IV antibiotics but then was sent home with oral antibiotics instead. He was told to not wear shoes but he went back to work and was wearing sneakers. He was having worsening pain and swelling in the leg which is why he came to the ED. Denies fevers or chills but states there has been a foul odor and discharge from his Left big toe and second toe. Has previous amputation on L foot.      PAST MEDICAL & SURGICAL HISTORY:  Type 2 diabetes mellitus  Toe amputation status, left      MEDICATIONS  (STANDING):  piperacillin/tazobactam IVPB. 3.375 Gram(s) IV Intermittent Once    MEDICATIONS  (PRN):      Allergies    No Known Allergies    Intolerances        VITALS:    Vital Signs Last 24 Hrs  T(C): 37.4 (09 Sep 2019 04:58), Max: 37.4 (09 Sep 2019 04:58)  T(F): 99.4 (09 Sep 2019 04:58), Max: 99.4 (09 Sep 2019 04:58)  HR: 80 (09 Sep 2019 04:58) (80 - 99)  BP: 111/54 (09 Sep 2019 04:58) (111/54 - 125/60)  BP(mean): --  RR: 16 (09 Sep 2019 04:58) (16 - 16)  SpO2: 100% (09 Sep 2019 04:58) (100% - 100%)    LABS:                          8.7    10.81 )-----------( 370      ( 09 Sep 2019 03:30 )             27.4       09-09    135  |  98  |  18  ----------------------------<  184<H>  4.3   |  24  |  1.25    Ca    8.6      09 Sep 2019 03:30    TPro  7.2  /  Alb  3.0<L>  /  TBili  0.4  /  DBili  x   /  AST  15  /  ALT  14  /  AlkPhos  58  09-09      CAPILLARY BLOOD GLUCOSE      POCT Blood Glucose.: 163 mg/dL (08 Sep 2019 23:56)      PT/INR - ( 09 Sep 2019 03:30 )   PT: 15.7 SEC;   INR: 1.36          PTT - ( 09 Sep 2019 03:30 )  PTT:34.6 SEC    LOWER EXTREMITY PHYSICAL EXAM:    Vascular: DP/PT 2/4, B/L, CFT <3 seconds B/L, Temperature gradient warm to warm B/L.   Neuro: Epicritic sensation absent  to the level of midfoot, B/L.  Musculoskeletal/Ortho: s/p LF partial fourth ray resection  Skin: left foot open wound plantar hallux and medial 2nd digit, swelling and erythema to level of ankle  Wound #1:   Location: left foot plantar hallux  Size: 5.0x5.0  Depth: to bone  Wound bed: fibronecrotic  Drainage: purulent  Odor: malodorous  Periwound: hyperkeratotic  Etiology: pressure, DMT2    RADIOLOGY & ADDITIONAL STUDIES:  < from: Xray Foot AP + Lateral + Oblique, Left (09.09.19 @ 04:00) >  ******PRELIMINARY REPORT******    ******PRELIMINARY REPORT******            EXAM:  RAD FOOT MIN 3 VIEWS LT        PROCEDURE DATE:  Sep  9 2019     ******PRELIMINARY REPORT******    ******PRELIMINARY REPORT******            INTERPRETATION:  Soft tissue ulceration involving the first digit distal   great toe.   There is age indeterminate cortical destruction and erosion involving the   first digit distal phalanx with widening of the DIP joint space. These   findings may be seen in the setting of acute osteomyelitis, an MRI could   be performed for more sensitive evaluation of clinically warranted.  Linear radiopaque density measuring 0.5 cm in length along the distal   plantar surface of the foot, clinically correlate to evaluate for foreign   body.            ******PRELIMINARY REPORT******    ******PRELIMINARY REPORT******          YAMIL ANTONY M.D., RADIOLOGY RESIDENT    < end of copied text >

## 2019-09-09 NOTE — ED PROVIDER NOTE - PHYSICAL EXAMINATION
PHYSICAL EXAM:    GENERAL: Comfortable, no acute distress   HEAD:  Normocephalic, atraumatic  EYES: EOMI, PERRLA  HEENT: Moist mucous membranes  NECK: Supple, No JVD  CHEST/LUNG: Clear to auscultation bilaterally  HEART: Regular rate and rhythm, no murmur   ABDOMEN: Soft, Nontender, Nondistended, Bowel sounds present  EXTREMITIES:  L > R swelling and erythema, warm to touch, large ulcer with central necrosis on L big toe and second toe with malodorous discharge   MUSCULOSKELETAL: No muscle tenderness, no joint tenderness  SKIN: warm and dry, no rash

## 2019-09-09 NOTE — CONSULT NOTE ADULT - ASSESSMENT
39M w/ PMHx of DM w/ c/c of LF plantar hallux wound with swelling and pain  - Pt seen and evaluated bedside in ED  - T 99.3, WBC 10.81, , .1  - Left foot plantar hallux wound, 5.0x5.0, depth to bone, fibronecrotic wound bed, scant purulent discharge, edema tracking proximally to level of ankle, +probe to bone  - Excisional debridement of plantar hallux wound to the level of but not beyond subQ, demonstrated 1cc purulent discharge, moderate sanguinous drainage  - Wound culture obtained  - Flushed and dressed with 4x4 gauze, ABD pads, and kenneth dressing  - Pod rec admit under medicine for empiric antibiotic coverage w/ Vancomycin & Zosyn   - Discussed with patient surgical measures to control infection source  - Ordered MRI to evaluate surgical resection   - Podiatry will continue to monitor  - Discussed with attending 39M w/ PMHx of DM w/ c/c of LF plantar hallux wound with swelling and pain  - Pt seen and evaluated bedside in ED  - T 99.3, WBC 10.81, , .1  - Left foot plantar hallux wound, 5.0x5.0, depth to bone, fibronecrotic wound bed, scant purulent discharge, edema tracking proximally to level of ankle, +probe to bone  - Excisional debridement of plantar hallux wound to the level of but not beyond subQ, demonstrated 1cc purulent discharge, moderate sanguinous drainage  - Wound culture obtained  - Flushed and dressed with 4x4 gauze, ABD pads, and kenneth dressing  - Pod rec admit under medicine for empiric antibiotic coverage w/ Vancomycin & Zosyn   - Discussed with patient surgical measures to control infection source  - Podiatry will continue to monitor  - Discussed with attending

## 2019-09-09 NOTE — ED PROVIDER NOTE - NS ED ROS FT
REVIEW OF SYSTEMS:    CONSTITUTIONAL: No fevers or chills  RESPIRATORY: No cough SOB  CARDIOVASCULAR: No chest pain or palpitations  GASTROINTESTINAL: No abdominal pain, nausea, or vomiting   GENITOURINARY: No dysuria, frequency or hematuria  NEUROLOGICAL: No numbness or weakness  SKIN: No itching, rashes

## 2019-09-09 NOTE — CONSULT NOTE ADULT - SUBJECTIVE AND OBJECTIVE BOX
VASCULAR SURGERY CONSULT NOTE    Chief Complaint: Not getting enough sleep in ED  HPI: 39M with worsening left foot pain. History of 2nd toe amp in past. Was seen last week at [facility] for LLE pain and wound on hallux, given IV ABX. 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 Presented to [facility] ED with worsening pain in big toe. Pods evaluated in ED, bedside debridement and probing of wound to bone. Copious bleeding. Want to admit for IV ABX and possible OR intervention.    PAST MEDICAL & SURGICAL HISTORY:  Type 2 diabetes mellitus  Toe amputation status, left    MEDICATIONS  (STANDING):    MEDICATIONS  (PRN):    Allergies    No Known Allergies    Intolerances        SOCIAL HISTORY   Smoking: None  ETOH: None  Drugs: None  Other:  None    FAMILY HISTORY:    Vital Signs Last 24 Hrs  T(C): 37.4 (09 Sep 2019 04:58), Max: 37.4 (09 Sep 2019 04:58)  T(F): 99.4 (09 Sep 2019 04:58), Max: 99.4 (09 Sep 2019 04:58)  HR: 80 (09 Sep 2019 04:58) (80 - 99)  BP: 111/54 (09 Sep 2019 04:58) (111/54 - 125/60)  BP(mean): --  RR: 16 (09 Sep 2019 04:58) (16 - 16)  SpO2: 100% (09 Sep 2019 04:58) (100% - 100%)      Physical Exam    PHYSICAL EXAM:  General: NAD, Sitting in bed comfortably  Neuro: AAO  HEENT: NC/AT, EOMI  Neck: Soft, supple  Cardio: NSR  Resp: Good effort, No distress  GI/Abd: Soft, NTND, no rebound/guarding, no masses palpated  Vascular: B/l radial pulses palpable, b/l DP/PT palpable  Skin: Large wound of LLE 1st toe, plantar surface. Pods dressing removed, fresh blood noted. Mild foul smell. No obvious pus. Tender.  Lymphatic/Nodes: No palpable lymphadenopathy  Musculoskeletal: All 4 extremities moving spontaneously, no limitations    LABS                        8.7    10.81 )-----------( 370      ( 09 Sep 2019 03:30 )             27.4     09-09    135  |  98  |  18  ----------------------------<  184<H>  4.3   |  24  |  1.25    Ca    8.6      09 Sep 2019 03:30    TPro  7.2  /  Alb  3.0<L>  /  TBili  0.4  /  DBili  x   /  AST  15  /  ALT  14  /  AlkPhos  58  09-09    LIVER FUNCTIONS - ( 09 Sep 2019 03:30 )  Alb: 3.0 g/dL / Pro: 7.2 g/dL / ALK PHOS: 58 u/L / ALT: 14 u/L / AST: 15 u/L / GGT: x           PT/INR - ( 09 Sep 2019 03:30 )   PT: 15.7 SEC;   INR: 1.36          PTT - ( 09 Sep 2019 03:30 )  PTT:34.6 SEC      RADIOLOGY & ADDITIONAL STUDIES:    Assessment/Plan: 39M with likely acute worsening of chronic LLE hallux wound. Palpable pulses with likely small vessel disease 2/2 DM.    1. IV ABX given in ED. Continue  2. Pain control  3. Would obtain DAGMAR/PVR to evaluate for additional vessel disease. Likely to be grossly normal, though given palpable pulses.  4. f/u Pods OR plan  5. No acute vascular intervention warranted at this time 2/2 pulse exam and brisk bleeding from wound.   6. DM control  7. f/u Cultures taken by Pods  8. Will follow along with you  9. Discussed with vascular fellow. VASCULAR SURGERY CONSULT NOTE    Chief Complaint: Not getting enough sleep in ED  HPI: 39M with worsening left foot pain. History of 2nd toe amp in past. Was seen last week at [facility] for LLE pain and wound on hallux, given IV ABX. 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 Presented to [facility] ED with worsening pain in big toe. Pods evaluated in ED, bedside debridement and probing of wound to bone. Copious bleeding. Want to admit for IV ABX and possible OR intervention.    PAST MEDICAL & SURGICAL HISTORY:  Type 2 diabetes mellitus  Toe amputation status, left    MEDICATIONS  (STANDING):    MEDICATIONS  (PRN):    Allergies    No Known Allergies    Intolerances        SOCIAL HISTORY   Smoking: None  ETOH: None  Drugs: None  Other:  None    FAMILY HISTORY:    Vital Signs Last 24 Hrs  T(C): 37.4 (09 Sep 2019 04:58), Max: 37.4 (09 Sep 2019 04:58)  T(F): 99.4 (09 Sep 2019 04:58), Max: 99.4 (09 Sep 2019 04:58)  HR: 80 (09 Sep 2019 04:58) (80 - 99)  BP: 111/54 (09 Sep 2019 04:58) (111/54 - 125/60)  BP(mean): --  RR: 16 (09 Sep 2019 04:58) (16 - 16)  SpO2: 100% (09 Sep 2019 04:58) (100% - 100%)      Physical Exam    PHYSICAL EXAM:  General: NAD, Sitting in bed comfortably  Neuro: AAO  HEENT: NC/AT, EOMI  Neck: Soft, supple  Cardio: NSR  Resp: Good effort, No distress  GI/Abd: Soft, NTND, no rebound/guarding, no masses palpated  Vascular: B/l radial pulses palpable, b/l DP/PT palpable  Skin: Large wound of LLE 1st toe, plantar surface. Pods dressing removed, fresh blood noted. Mild foul smell. No obvious pus. Tender.  Lymphatic/Nodes: No palpable lymphadenopathy  Musculoskeletal: All 4 extremities moving spontaneously, no limitations    LABS                        8.7    10.81 )-----------( 370      ( 09 Sep 2019 03:30 )             27.4     09-09    135  |  98  |  18  ----------------------------<  184<H>  4.3   |  24  |  1.25    Ca    8.6      09 Sep 2019 03:30    TPro  7.2  /  Alb  3.0<L>  /  TBili  0.4  /  DBili  x   /  AST  15  /  ALT  14  /  AlkPhos  58  09-09    LIVER FUNCTIONS - ( 09 Sep 2019 03:30 )  Alb: 3.0 g/dL / Pro: 7.2 g/dL / ALK PHOS: 58 u/L / ALT: 14 u/L / AST: 15 u/L / GGT: x           PT/INR - ( 09 Sep 2019 03:30 )   PT: 15.7 SEC;   INR: 1.36          PTT - ( 09 Sep 2019 03:30 )  PTT:34.6 SEC      RADIOLOGY & ADDITIONAL STUDIES:    Assessment/Plan: 39M with likely acute worsening of chronic LLE hallux wound. Palpable pulses with likely small vessel disease 2/2 DM.    1. IV ABX given in ED. Continue  2. Pain control  3. Would obtain DAGMAR/PVR to evaluate for additional vessel disease. Likely to be grossly normal, though given palpable pulses.  4. f/u Pods OR plan  5. No acute vascular intervention warranted at this time 2/2 pulse exam and brisk bleeding from wound.   6. DM control  7. f/u Cultures taken by Pods  8. Will follow along with you  9. Discussed with vascular fellow and attending. VASCULAR SURGERY CONSULT NOTE    Chief Complaint: Not getting enough sleep in ED  HPI: 39M with worsening left foot pain. History of 2nd toe amp in past. Was seen last week at [facility] for LLE pain and wound on hallux, given IV ABX. 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 Presented to [facility] ED with worsening pain in big toe. Pods evaluated in ED, bedside debridement and probing of wound to bone. Copious bleeding. Want to admit for IV ABX and possible OR intervention.    PAST MEDICAL & SURGICAL HISTORY:  Type 2 diabetes mellitus  Toe amputation status, left    MEDICATIONS  (STANDING):    MEDICATIONS  (PRN):    Allergies    No Known Allergies    Intolerances        SOCIAL HISTORY   Smoking: None  ETOH: None  Drugs: None  Other:  None    FAMILY HISTORY:    Vital Signs Last 24 Hrs  T(C): 37.4 (09 Sep 2019 04:58), Max: 37.4 (09 Sep 2019 04:58)  T(F): 99.4 (09 Sep 2019 04:58), Max: 99.4 (09 Sep 2019 04:58)  HR: 80 (09 Sep 2019 04:58) (80 - 99)  BP: 111/54 (09 Sep 2019 04:58) (111/54 - 125/60)  BP(mean): --  RR: 16 (09 Sep 2019 04:58) (16 - 16)  SpO2: 100% (09 Sep 2019 04:58) (100% - 100%)      Physical Exam    PHYSICAL EXAM:  General: NAD, Sitting in bed comfortably  Neuro: AAO  HEENT: NC/AT, EOMI  Neck: Soft, supple  Cardio: NSR  Resp: Good effort, No distress  GI/Abd: Soft, NTND, no rebound/guarding, no masses palpated  Vascular: B/l radial pulses palpable, b/l DP/PT palpable  Skin: Large wound of LLE 1st toe, plantar surface. Pods dressing removed, fresh blood noted. Mild foul smell. No obvious pus. Tender.  Lymphatic/Nodes: No palpable lymphadenopathy  Musculoskeletal: All 4 extremities moving spontaneously, no limitations    LABS                        8.7    10.81 )-----------( 370      ( 09 Sep 2019 03:30 )             27.4     09-09    135  |  98  |  18  ----------------------------<  184<H>  4.3   |  24  |  1.25    Ca    8.6      09 Sep 2019 03:30    TPro  7.2  /  Alb  3.0<L>  /  TBili  0.4  /  DBili  x   /  AST  15  /  ALT  14  /  AlkPhos  58  09-09    LIVER FUNCTIONS - ( 09 Sep 2019 03:30 )  Alb: 3.0 g/dL / Pro: 7.2 g/dL / ALK PHOS: 58 u/L / ALT: 14 u/L / AST: 15 u/L / GGT: x           PT/INR - ( 09 Sep 2019 03:30 )   PT: 15.7 SEC;   INR: 1.36          PTT - ( 09 Sep 2019 03:30 )  PTT:34.6 SEC

## 2019-09-09 NOTE — H&P ADULT - ATTENDING COMMENTS
Patient seen and examined. Agree with above note by resident.    #OM of left toe - Does not meet criteria for sepsis. Clinically suspicion high for OM given elevated ESR and wound with active pus. MRI ordered to confirm diagnosis. Podiatry input appreciated. Plan for OR later this week. Would c/w broad spectrum antibiotics as cultures will likely be polymicrobial. C/w vanc and zosyn. Monitor vanc trough. Follow up blood cx. Patient is young and is low risk for surgery. Reports good exercise tolerance. Would get EKG and also obtain good FS control in order to optimized for planned surgery.     #DM2 with hyperglycemia - patient states that he takes 15 units of Humalog. For now would monitor on sliding scale and add basal/bolus as needed. Check hgbA1c    Plan discussed with patient and HS

## 2019-09-09 NOTE — PROGRESS NOTE ADULT - SUBJECTIVE AND OBJECTIVE BOX
Podiatry pager #: [facility] [phone]/ [phone]    Patient is a 39y old  Male who presents with a chief complaint of Left foot pain and swelling (09 Sep 2019 10:07)       INTERVAL HPI/OVERNIGHT EVENTS:  Patient seen and evaluated at bedside.  Pt is resting comfortable in NAD. Denies N/V/F/C.      Allergies    No Known Allergies    Intolerances        Vital Signs Last 24 Hrs  T(C): 37.4 (09 Sep 2019 04:58), Max: 37.4 (09 Sep 2019 04:58)  T(F): 99.4 (09 Sep 2019 04:58), Max: 99.4 (09 Sep 2019 04:58)  HR: 80 (09 Sep 2019 04:58) (80 - 99)  BP: 111/54 (09 Sep 2019 04:58) (111/54 - 125/60)  BP(mean): --  RR: 16 (09 Sep 2019 04:58) (16 - 16)  SpO2: 100% (09 Sep 2019 04:58) (100% - 100%)    LABS:                        8.7    10.81 )-----------( 370      ( 09 Sep 2019 03:30 )             27.4     09-09    135  |  98  |  18  ----------------------------<  184<H>  4.3   |  24  |  1.25    Ca    8.6      09 Sep 2019 03:30    TPro  7.2  /  Alb  3.0<L>  /  TBili  0.4  /  DBili  x   /  AST  15  /  ALT  14  /  AlkPhos  58  09-09    PT/INR - ( 09 Sep 2019 03:30 )   PT: 15.7 SEC;   INR: 1.36          PTT - ( 09 Sep 2019 03:30 )  PTT:34.6 SEC    CAPILLARY BLOOD GLUCOSE      POCT Blood Glucose.: 188 mg/dL (09 Sep 2019 10:01)  POCT Blood Glucose.: 163 mg/dL (08 Sep 2019 23:56)      Lower Extremity Physical Exam:  Vascular: DP/PT 2/4, B/L, CFT <3 seconds B/L, Temperature gradient warm to warm B/L.   Neuro: Epicritic sensation absent to the level of midfoot, B/L.  Musculoskeletal/Ortho: s/p LF partial fourth ray resection  Skin: left foot open wound plantar hallux and medial 2nd digit, swelling and erythema to level of ankle  Wound #1:   Location: left foot plantar hallux  Size: 5.0x5.0  Depth: to bone  Wound bed: fibronecrotic  Drainage: purulent  Odor: malodorous  Periwound: hyperkeratotic  Etiology: pressure, DMT2    RADIOLOGY & ADDITIONAL TESTS:  < from: Xray Foot AP + Lateral + Oblique, Left (09.09.19 @ 04:00) >    EXAM:  RAD FOOT MIN 3 VIEWS LT        PROCEDURE DATE:  Sep  9 2019         INTERPRETATION:  CLINICAL INFORMATION: Left foot diabetic ulcer, evaluate   for osteomyelitis.     TECHNIQUE: 3 views of the left foot.    COMPARISON: None.    FINDINGS:   Soft tissue ulceration involving the first digit distal great toe.   There is age indeterminate cortical destruction and erosion involving the   first digit distal phalanx with widening of the DIP joint space.     Status post fourth digit distal metatarsal amputation with relatively   sharp osseous margins.  Linear radiopaque density measuring 0.5 cm in length along the distal   plantar surface of the foot, clinically correlate to evaluate for foreign   body.     Calcaneal enthesophyte.  Vascular calcifications.    IMPRESSION:    1. Soft tissue ulceration involving the first digit distal great toe.  2. Osteomyelitis of first toe.                YAMIL ANTONY M.D., RADIOLOGY RESIDENT  This document has been electronically signed.  PAYAL DA SILVA M.D.,ATTENDING RADIOLOGIST  This document has been electronically signed. Sep  9 2019  8:39AM    < end of copied text >

## 2019-09-09 NOTE — PROGRESS NOTE ADULT - ASSESSMENT
39M w/ PMHx of DM w/ c/c of LF plantar hallux wound with swelling and pain  - Pt seen and evaluated bedside in ED  - WBC 10.81, , .1  - Left foot plantar hallux wound, [id number], depth to bone, fibronecrotic wound bed, scant purulent discharge, edema tracking proximally to level of ankle, +probe to bone. Left medial 2nd toe wound to subQ with dactylitis   - Flushed and dressed with betadine, 4x4 gauze, and kenneth dressing  - continue empiric antibiotic coverage w/ Vancomycin & Zosyn till wound culture is back   - Xray: OM to the left hallux.  - Ordered MR to eval OM for second toe   - patient is booked for surgery on Thursday for OM resection with Dr. Izquierdo. Please document medical clearance prior to OR for light sedation with local   - seen with attending

## 2019-09-09 NOTE — ED PROVIDER NOTE - PROGRESS NOTE DETAILS
Ajith PGY3: Spoke with podiatry after exam. Patient's wound is necrotic and after debridement reaches the bone. Planning on doing surgical intervention and pending MRI. Spoke with hospitalist for admission but hospitalist asked vascular surgery to be called to see if they take patients who will be going for planned surgical services onto their service. Would like a call back after this. LIVE Crocker: received sign out from Dr Contreras and Dr Canseco to follow up vascular consult and admit to Surgery vs Medicine.  Per surgery advised to admit to hospitalist.  Spoke with Hospitalist advised to admit to Dr Ramirez.  MAR notified.  Pt resting comfortably in bed NAD.

## 2019-09-09 NOTE — H&P ADULT - PROBLEM SELECTOR PLAN 1
Patient presents with one week history of left foot swelling. Left foot x-ray with osteomyelitis of the first toe. , .1 and WBC 10.81.  - VA duplex of left foot pending  - wound coulture pending  - patient was seen by Podiatry who plans OR procedure  - Surgery following; no vascular intervention at this time   - wound care  - acetaminophen prn for pain Patient presents with one week history of left foot swelling. Left foot x-ray with osteomyelitis of the first toe. , .1 and WBC 10.81.  - VA duplex of left foot pending  - MRI ordered  - wound culture pending  - patient was seen by Podiatry is booked for surgery on Thursday for OM resection with Dr. Izquierdo; will need medical clearance.  - Surgery following; no vascular intervention at this time   - wound care  - acetaminophen prn for pain Patient presents with one week history of left foot swelling. Left foot x-ray with osteomyelitis of the first toe. , .1 and WBC 10.81.  - VA duplex of left foot pending  - MRI ordered  - wound culture pending  - blood cultures pending  - patient was seen by Podiatry is booked for surgery on Thursday for OM resection with Dr. Izquierdo; will need medical clearance.  - EKG ordered  - Surgery following; no vascular intervention at this time   - wound care  - acetaminophen prn for pain Patient presents with one week history of left foot swelling. Left foot x-ray with osteomyelitis of the first toe. , .1 and WBC 10.81.  - VA duplex of left foot pending  - MRI ordered  - wound culture pending  - blood cultures pending  -c/w broad spectrum antibiotics as cultures will likely be polymicrobial   - patient was seen by Podiatry is booked for surgery on Thursday for OM resection with Dr. Izquierdo; will need medical clearance.  - EKG ordered  - Surgery following; no vascular intervention at this time   - wound care  - acetaminophen prn for pain

## 2019-09-09 NOTE — ED ADULT NURSE REASSESSMENT NOTE - NS ED NURSE REASSESS COMMENT FT1
Pt. was seen by podiatry; wound was cleaned and dressed.  Pt. to be admitted for antibiotics. Pt. still refusing to take clothing off.

## 2019-09-09 NOTE — H&P ADULT - NSHPLABSRESULTS_GEN_ALL_CORE
LABS:                          8.7    10.81 )-----------( 370      ( 09 Sep 2019 03:30 )             27.4     Hb Trend: 8.7<--  WBC Trend: 10.81<--  Plt Trend: 370<--          09-09    135  |  98  |  18  ----------------------------<  184<H>  4.3   |  24  |  1.25    Ca    8.6      09 Sep 2019 03:30    TPro  7.2  /  Alb  3.0<L>  /  TBili  0.4  /  DBili  x   /  AST  15  /  ALT  14  /  AlkPhos  58  09-09      PT/INR - ( 09 Sep 2019 03:30 )   PT: 15.7 SEC;   INR: 1.36          PTT - ( 09 Sep 2019 03:30 )  PTT:34.6 SEC    CAPILLARY BLOOD GLUCOSE      POCT Blood Glucose.: 188 mg/dL (09 Sep 2019 10:01)  POCT Blood Glucose.: 163 mg/dL (08 Sep 2019 23:56)          IMAGING:    < from: Xray Foot AP + Lateral + Oblique, Left (09.09.19 @ 04:00) >      EXAM:  RAD FOOT MIN 3 VIEWS LT        PROCEDURE DATE:  Sep  9 2019         INTERPRETATION:  CLINICAL INFORMATION: Left foot diabetic ulcer, evaluate   for osteomyelitis.     TECHNIQUE: 3 views of the left foot.    COMPARISON: None.    FINDINGS:   Soft tissue ulceration involving the first digit distal great toe.   There is age indeterminate cortical destruction and erosion involving the   first digit distal phalanx with widening of the DIP joint space.     Status post fourth digit distal metatarsal amputation with relatively   sharp osseous margins.  Linear radiopaque density measuring 0.5 cm in length along the distal   plantar surface of the foot, clinically correlate to evaluate for foreign   body.     Calcaneal enthesophyte.  Vascular calcifications.    IMPRESSION:    1. Soft tissue ulceration involving the first digit distal great toe.  2. Osteomyelitis of first toe.    < end of copied text >

## 2019-09-09 NOTE — ED ADULT NURSE NOTE - OBJECTIVE STATEMENT
Pt. received into room #12 with c/o pain and swelling to L foot. Pt. noted with ulcer necrotic to L big toe and bruising to between big toe and middle toe on the L foot; with foul odor and bloody drainage. Pt. refusing to take off clothing for x ray of foot or for IV placement. MD aware.

## 2019-09-09 NOTE — H&P ADULT - ASSESSMENT
39 year old male with T2DM and history of left toe amputation (December 2018) presents with one-week history of left foot pain and swelling in the setting suspected uncontrolled diabetes 2/2 medication noncompliance, x-ray consistent with osteomyelitis of left toe, admitted for further management.

## 2019-09-09 NOTE — H&P ADULT - PROBLEM SELECTOR PLAN 2
Patient has been non-compliant with medications.  - HbA1c ordered; if severely elevated, will need endocrine consult  - diabetes education  - monitor FSG  - ISS

## 2019-09-10 LAB
ANION GAP SERPL CALC-SCNC: 11 MMO/L — SIGNIFICANT CHANGE UP (ref 7–14)
BASOPHILS # BLD AUTO: 0.03 K/UL — SIGNIFICANT CHANGE UP (ref 0–0.2)
BASOPHILS NFR BLD AUTO: 0.5 % — SIGNIFICANT CHANGE UP (ref 0–2)
BUN SERPL-MCNC: 19 MG/DL — SIGNIFICANT CHANGE UP (ref 7–23)
CALCIUM SERPL-MCNC: 8.6 MG/DL — SIGNIFICANT CHANGE UP (ref 8.4–10.5)
CHLORIDE SERPL-SCNC: 97 MMOL/L — LOW (ref 98–107)
CO2 SERPL-SCNC: 28 MMOL/L — SIGNIFICANT CHANGE UP (ref 22–31)
CREAT SERPL-MCNC: 1.12 MG/DL — SIGNIFICANT CHANGE UP (ref 0.5–1.3)
EOSINOPHIL # BLD AUTO: 0.32 K/UL — SIGNIFICANT CHANGE UP (ref 0–0.5)
EOSINOPHIL NFR BLD AUTO: 5 % — SIGNIFICANT CHANGE UP (ref 0–6)
GLUCOSE BLDC GLUCOMTR-MCNC: 187 MG/DL — HIGH (ref 70–99)
GLUCOSE BLDC GLUCOMTR-MCNC: 258 MG/DL — HIGH (ref 70–99)
GLUCOSE BLDC GLUCOMTR-MCNC: 276 MG/DL — HIGH (ref 70–99)
GLUCOSE SERPL-MCNC: 348 MG/DL — HIGH (ref 70–99)
HBA1C BLD-MCNC: 11.9 % — HIGH (ref 4–5.6)
HCT VFR BLD CALC: 30.3 % — LOW (ref 39–50)
HGB BLD-MCNC: 9.5 G/DL — LOW (ref 13–17)
IMM GRANULOCYTES NFR BLD AUTO: 0.5 % — SIGNIFICANT CHANGE UP (ref 0–1.5)
LYMPHOCYTES # BLD AUTO: 0.8 K/UL — LOW (ref 1–3.3)
LYMPHOCYTES # BLD AUTO: 12.5 % — LOW (ref 13–44)
MAGNESIUM SERPL-MCNC: 1.8 MG/DL — SIGNIFICANT CHANGE UP (ref 1.6–2.6)
MCHC RBC-ENTMCNC: 27.5 PG — SIGNIFICANT CHANGE UP (ref 27–34)
MCHC RBC-ENTMCNC: 31.4 % — LOW (ref 32–36)
MCV RBC AUTO: 87.6 FL — SIGNIFICANT CHANGE UP (ref 80–100)
MONOCYTES # BLD AUTO: 0.66 K/UL — SIGNIFICANT CHANGE UP (ref 0–0.9)
MONOCYTES NFR BLD AUTO: 10.3 % — SIGNIFICANT CHANGE UP (ref 2–14)
NEUTROPHILS # BLD AUTO: 4.54 K/UL — SIGNIFICANT CHANGE UP (ref 1.8–7.4)
NEUTROPHILS NFR BLD AUTO: 71.2 % — SIGNIFICANT CHANGE UP (ref 43–77)
NRBC # FLD: 0 K/UL — SIGNIFICANT CHANGE UP (ref 0–0)
PHOSPHATE SERPL-MCNC: 3.5 MG/DL — SIGNIFICANT CHANGE UP (ref 2.5–4.5)
PLATELET # BLD AUTO: 386 K/UL — SIGNIFICANT CHANGE UP (ref 150–400)
PMV BLD: 9.5 FL — SIGNIFICANT CHANGE UP (ref 7–13)
POTASSIUM SERPL-MCNC: 4.5 MMOL/L — SIGNIFICANT CHANGE UP (ref 3.5–5.3)
POTASSIUM SERPL-SCNC: 4.5 MMOL/L — SIGNIFICANT CHANGE UP (ref 3.5–5.3)
RBC # BLD: 3.46 M/UL — LOW (ref 4.2–5.8)
RBC # FLD: 12.8 % — SIGNIFICANT CHANGE UP (ref 10.3–14.5)
SODIUM SERPL-SCNC: 136 MMOL/L — SIGNIFICANT CHANGE UP (ref 135–145)
SPECIMEN SOURCE: SIGNIFICANT CHANGE UP
WBC # BLD: 6.38 K/UL — SIGNIFICANT CHANGE UP (ref 3.8–10.5)
WBC # FLD AUTO: 6.38 K/UL — SIGNIFICANT CHANGE UP (ref 3.8–10.5)

## 2019-09-10 PROCEDURE — 73720 MRI LWR EXTREMITY W/O&W/DYE: CPT | Mod: 26,LT

## 2019-09-10 PROCEDURE — 99222 1ST HOSP IP/OBS MODERATE 55: CPT

## 2019-09-10 PROCEDURE — 99232 SBSQ HOSP IP/OBS MODERATE 35: CPT

## 2019-09-10 RX ORDER — INSULIN LISPRO 100/ML
3 VIAL (ML) SUBCUTANEOUS
Refills: 0 | Status: DISCONTINUED | OUTPATIENT
Start: 2019-09-10 | End: 2019-09-12

## 2019-09-10 RX ORDER — INSULIN LISPRO 100/ML
2 VIAL (ML) SUBCUTANEOUS
Refills: 0 | Status: DISCONTINUED | OUTPATIENT
Start: 2019-09-10 | End: 2019-09-10

## 2019-09-10 RX ORDER — CILOSTAZOL 100 MG/1
50 TABLET ORAL
Refills: 0 | Status: DISCONTINUED | OUTPATIENT
Start: 2019-09-10 | End: 2019-09-12

## 2019-09-10 RX ORDER — INSULIN GLARGINE 100 [IU]/ML
10 INJECTION, SOLUTION SUBCUTANEOUS AT BEDTIME
Refills: 0 | Status: DISCONTINUED | OUTPATIENT
Start: 2019-09-10 | End: 2019-09-11

## 2019-09-10 RX ORDER — CIPROFLOXACIN LACTATE 400MG/40ML
400 VIAL (ML) INTRAVENOUS EVERY 12 HOURS
Refills: 0 | Status: DISCONTINUED | OUTPATIENT
Start: 2019-09-10 | End: 2019-09-12

## 2019-09-10 RX ORDER — INSULIN GLARGINE 100 [IU]/ML
5 INJECTION, SOLUTION SUBCUTANEOUS AT BEDTIME
Refills: 0 | Status: DISCONTINUED | OUTPATIENT
Start: 2019-09-10 | End: 2019-09-10

## 2019-09-10 RX ADMIN — Medication 4: at 07:58

## 2019-09-10 RX ADMIN — Medication 2 UNIT(S): at 12:03

## 2019-09-10 RX ADMIN — INSULIN GLARGINE 10 UNIT(S): 100 INJECTION, SOLUTION SUBCUTANEOUS at 22:04

## 2019-09-10 RX ADMIN — Medication 250 MILLIGRAM(S): at 07:59

## 2019-09-10 RX ADMIN — Medication 3: at 17:05

## 2019-09-10 RX ADMIN — Medication 1: at 22:04

## 2019-09-10 RX ADMIN — Medication 250 MILLIGRAM(S): at 18:37

## 2019-09-10 RX ADMIN — CILOSTAZOL 50 MILLIGRAM(S): 100 TABLET ORAL at 18:37

## 2019-09-10 RX ADMIN — Medication 2 UNIT(S): at 17:05

## 2019-09-10 RX ADMIN — PIPERACILLIN AND TAZOBACTAM 25 GRAM(S): 4; .5 INJECTION, POWDER, LYOPHILIZED, FOR SOLUTION INTRAVENOUS at 09:23

## 2019-09-10 RX ADMIN — Medication 1: at 11:59

## 2019-09-10 RX ADMIN — Medication 200 MILLIGRAM(S): at 17:17

## 2019-09-10 NOTE — PROGRESS NOTE ADULT - ASSESSMENT
39M w/ PMHx of DM w/ c/c of LF plantar hallux wound with swelling and pain  - Pt seen and evaluated bedside in ED  - WBC trending down   - LF XR showing OM to the left hallux  - Left foot plantar hallux dactylitis w/ wound down to bone. Wound bed is fibronecrotic, scant purulent discharge, edema tracking proximally to level of ankle. Left medial 2nd toe wound to subQ with dactylitis   - Flushed and dressed with betadine, 4x4 gauze, and kenneth dressing  - prelim culture growing GNR  - cont IV abx   - Awaiting MR results   - pod plan- Booked for surgery on Thursday for OM resection with Dr. Izquierdo  - please document in chart medial optimization/clearance for local with light sedation   - seen with attending

## 2019-09-10 NOTE — PROGRESS NOTE ADULT - ASSESSMENT
Assessment/Plan: 39M with likely acute worsening of chronic LLE hallux wound. Palpable pulses with likely small vessel disease 2/2 DM.    - Cont IV ABX   - Follow podiatry recommendations  - DAGMAR/PVR noted to be normal and patient with palpable pulses -no other vascular surgical intervention indicated at this time, please page with any acute questions/concerns.    YIMI Burnett, PGY2  Vascular Surgery (C Team Surgery)  [id number] with any questions Assessment/Plan: 39M with likely acute worsening of chronic LLE hallux wound. Palpable pulses with likely small vessel disease 2/2 DM.    - Cont IV ABX   - Continue local wound care  - Follow podiatry recommendations  - DAGMAR/PVR noted to be normal and patient with palpable pulses -no other vascular surgical intervention indicated at this time, please page with any acute questions/concerns.    YIMI Burnett, PGY2  Vascular Surgery (C Team Surgery)  [id number] with any questions Assessment/Plan: 39M with likely acute worsening of chronic LLE hallux wound. Palpable pulses with likely small vessel disease 2/2 DM.    - Cont IV ABX   - Continue local wound care  - Follow podiatry recommendations  - pt is cleared from vasc surg  standpoint for podiatric intervention  recommend pletal 50 bid   will follow

## 2019-09-10 NOTE — PROGRESS NOTE ADULT - SUBJECTIVE AND OBJECTIVE BOX
SUBJECTIVE / OVERNIGHT EVENTS: pt denies chest pain, shortness of breath       MEDICATIONS  (STANDING):  cilostazol 50 milliGRAM(s) Oral two times a day  ciprofloxacin   IVPB 400 milliGRAM(s) IV Intermittent every 12 hours  dextrose 5%. 1000 milliLiter(s) (50 mL/Hr) IV Continuous <Continuous>  dextrose 50% Injectable 12.5 Gram(s) IV Push once  dextrose 50% Injectable 25 Gram(s) IV Push once  dextrose 50% Injectable 25 Gram(s) IV Push once  insulin glargine Injectable (LANTUS) 10 Unit(s) SubCutaneous at bedtime  insulin lispro (HumaLOG) corrective regimen sliding scale   SubCutaneous three times a day before meals  insulin lispro (HumaLOG) corrective regimen sliding scale   SubCutaneous at bedtime  insulin lispro Injectable (HumaLOG) 3 Unit(s) SubCutaneous three times a day before meals  vancomycin  IVPB 1000 milliGRAM(s) IV Intermittent every 12 hours    MEDICATIONS  (PRN):  dextrose 40% Gel 15 Gram(s) Oral once PRN Blood Glucose LESS THAN 70 milliGRAM(s)/deciliter  glucagon  Injectable 1 milliGRAM(s) IntraMuscular once PRN Glucose LESS THAN 70 milligrams/deciliter    Vital Signs Last 24 Hrs  T(C): 37.4 (10 Sep 2019 17:00), Max: 37.4 (10 Sep 2019 17:00)  T(F): 99.3 (10 Sep 2019 17:00), Max: 99.3 (10 Sep 2019 17:00)  HR: 82 (10 Sep 2019 17:00) (76 - 82)  BP: 96/55 (10 Sep 2019 17:00) (96/55 - 112/67)  BP(mean): --  RR: 17 (10 Sep 2019 17:00) (16 - 17)  SpO2: 99% (10 Sep 2019 17:00) (99% - 100%)    CAPILLARY BLOOD GLUCOSE      POCT Blood Glucose.: 276 mg/dL (10 Sep 2019 21:43)  POCT Blood Glucose.: 258 mg/dL (10 Sep 2019 16:52)  POCT Blood Glucose.: 187 mg/dL (10 Sep 2019 11:56)  POCT Blood Glucose.: 309 mg/dL (10 Sep 2019 07:45)  POCT Blood Glucose.: 246 mg/dL (09 Sep 2019 22:17)    I&O's Summary      Constitutional: No fever, fatigue  Skin: No rash.  Eyes: No recent vision problems or eye pain.  ENT: No congestion, ear pain, or sore throat.  Cardiovascular: No chest pain or palpation.  Respiratory: No cough, shortness of breath, congestion, or wheezing.  Gastrointestinal: No abdominal pain, nausea, vomiting, or diarrhea.  Genitourinary: No dysuria.  Musculoskeletal: No joint swelling.  Neurologic: No headache.    PHYSICAL EXAM:  GENERAL: NAD  EYES: EOMI, PERRLA  NECK: Supple, No JVD  CHEST/LUNG: cta charan   HEART:  S1 , S2 +  ABDOMEN: soft , bs+  EXTREMITIES: left  foot dressing+  NEUROLOGY:alert awake oriented       LABS:                        9.5    6.38  )-----------( 386      ( 10 Sep 2019 05:42 )             30.3     09-10    136  |  97<L>  |  19  ----------------------------<  348<H>  4.5   |  28  |  1.12    Ca    8.6      10 Sep 2019 05:42  Phos  3.5     09-10  Mg     1.8     09-10    TPro  7.2  /  Alb  3.0<L>  /  TBili  0.4  /  DBili  x   /  AST  15  /  ALT  14  /  AlkPhos  58  09-09    PT/INR - ( 09 Sep 2019 03:30 )   PT: 15.7 SEC;   INR: 1.36          PTT - ( 09 Sep 2019 03:30 )  PTT:34.6 SEC          RADIOLOGY & ADDITIONAL TESTS:    Imaging Personally Reviewed:    Consultant(s) Notes Reviewed:      Care Discussed with Consultants/Other Providers:

## 2019-09-10 NOTE — PROGRESS NOTE ADULT - SUBJECTIVE AND OBJECTIVE BOX
Patient is a 39y old  Male who presents with a chief complaint of Left foot pain and swelling (10 Sep 2019 12:54)       INTERVAL HPI/OVERNIGHT EVENTS:  Patient seen and evaluated at bedside.  Pt is resting comfortable in NAD. Denies N/V/F/C.     Allergies    No Known Allergies    Intolerances        Vital Signs Last 24 Hrs  T(C): 37.2 (10 Sep 2019 05:21), Max: 37.2 (10 Sep 2019 05:21)  T(F): 99 (10 Sep 2019 05:21), Max: 99 (10 Sep 2019 05:21)  HR: 76 (10 Sep 2019 05:21) (76 - 81)  BP: 102/58 (10 Sep 2019 05:21) (102/58 - 128/68)  BP(mean): --  RR: 16 (10 Sep 2019 05:21) (16 - 18)  SpO2: 99% (10 Sep 2019 05:21) (98% - 100%)    LABS:                        9.5    6.38  )-----------( 386      ( 10 Sep 2019 05:42 )             30.3     09-10    136  |  97<L>  |  19  ----------------------------<  348<H>  4.5   |  28  |  1.12    Ca    8.6      10 Sep 2019 05:42  Phos  3.5     09-10  Mg     1.8     09-10    TPro  7.2  /  Alb  3.0<L>  /  TBili  0.4  /  DBili  x   /  AST  15  /  ALT  14  /  AlkPhos  58  09-09    PT/INR - ( 09 Sep 2019 03:30 )   PT: 15.7 SEC;   INR: 1.36          PTT - ( 09 Sep 2019 03:30 )  PTT:34.6 SEC    CAPILLARY BLOOD GLUCOSE      POCT Blood Glucose.: 187 mg/dL (10 Sep 2019 11:56)  POCT Blood Glucose.: 309 mg/dL (10 Sep 2019 07:45)  POCT Blood Glucose.: 246 mg/dL (09 Sep 2019 22:17)  POCT Blood Glucose.: 205 mg/dL (09 Sep 2019 18:05)  POCT Blood Glucose.: 139 mg/dL (09 Sep 2019 15:55)  POCT Blood Glucose.: 158 mg/dL (09 Sep 2019 14:00)      Lower Extremity Physical Exam:  Vascular: DP/PT 2/4, B/L, CFT <3 seconds B/L, Temperature gradient warm to warm B/L.   Neuro: Epicritic sensation absent to the level of midfoot, B/L.  Musculoskeletal/Ortho: s/p LF partial fourth ray resection  Skin: left foot open wound plantar hallux and medial 2nd digit, swelling and erythema to level of ankle    Wound #1:   Location: left foot plantar hallux  Size: 5.0cm x 5.0cm  Depth: to bone  Wound bed: fibronecrotic  Drainage: purulent  Odor: malodorous  Periwound: hyperkeratotic  Etiology: pressure, DMT2      RADIOLOGY & ADDITIONAL TESTS:  < from: Xray Foot AP + Lateral + Oblique, Left (09.09.19 @ 04:00) >    EXAM:  RAD FOOT MIN 3 VIEWS LT        PROCEDURE DATE:  Sep  9 2019         INTERPRETATION:  CLINICAL INFORMATION: Left foot diabetic ulcer, evaluate   for osteomyelitis.     TECHNIQUE: 3 views of the left foot.    COMPARISON: None.    FINDINGS:   Soft tissue ulceration involving the first digit distal great toe.   There is age indeterminate cortical destruction and erosion involving the   first digit distal phalanx with widening of the DIP joint space.     Status post fourth digit distal metatarsal amputation with relatively   sharp osseous margins.  Linear radiopaque density measuring 0.5 cm in length along the distal   plantar surface of the foot, clinically correlate to evaluate for foreign   body.     Calcaneal enthesophyte.  Vascular calcifications.    IMPRESSION:    1. Soft tissue ulceration involving the first digit distal great toe.  2. Osteomyelitis of first toe.    YAMIL ANTONY M.D., RADIOLOGY RESIDENT  This document has been electronically signed.  PAYAL DA SILVA M.D.,ATTENDING RADIOLOGIST  This document has been electronically signed. Sep  9 2019  8:39AM

## 2019-09-10 NOTE — CHART NOTE - NSCHARTNOTEFT_GEN_A_CORE
38 y/o M w/ T2DM p/w osteomyelitis. 150 lbs with normal renal function, unclear home regimen migth have been basiglar 15 units qhs but does not take it consistently.   Rec prelim lantus 10 units and humalog 3 units, plan for OR on 9/12.

## 2019-09-10 NOTE — CONSULT NOTE ADULT - ASSESSMENT
39 year old with dm and foot ulcer, prior diagnosis of Om of foot, presents with fever and foot pain with non-healing wound      1) Foot ulcer  prior cultures with citrobacter and acinitobacter    Change zosyn to Cipro    Can continue vancomycin pending additional culture data    2) Osteomyelitis of foot  Plan is for OR debridement later this week      3) DM: glycemic control key to wound healing      4) Therapeutic drug monitoring  Check vancomycin through before 4th dose

## 2019-09-10 NOTE — CONSULT NOTE ADULT - SUBJECTIVE AND OBJECTIVE BOX
HPI:  39 year old male with   ·	T2DM  ·	history of left toe amputation (December 2018)     Patient was admitted 8/17 to 8/19 at [facility] with a left first toe ulcer. MRI showed OM.  Podiatry deferred surgery.  Plan was for six weeks of iv antbioitcs- pt refused. Discharged on po medication- augmentin.     The patient was instructed to stay off his feet and not wear shoes, but he did not listen to this advice. He wore sneakers and went to work. His foot swelling began to worsen after this with a foul smelling odor. The patient came to the ED.    He presents with complaint of left foot pain and swelling on 9/9 .      He has been afebrile.     S/p bedside debridement.        PAST MEDICAL & SURGICAL HISTORY:  Type 2 diabetes mellitus  DM (diabetes mellitus)  Toe amputation status, left  History of partial ray amputation of fourth toe of left foot      Allergies    No Known Allergies    Intolerances        ANTIMICROBIALS:  piperacillin/tazobactam IVPB.. 3.375 every 8 hours  vancomycin  IVPB 1000 every 12 hours      OTHER MEDS:  dextrose 40% Gel 15 Gram(s) Oral once PRN  dextrose 5%. 1000 milliLiter(s) IV Continuous <Continuous>  dextrose 50% Injectable 12.5 Gram(s) IV Push once  dextrose 50% Injectable 25 Gram(s) IV Push once  dextrose 50% Injectable 25 Gram(s) IV Push once  glucagon  Injectable 1 milliGRAM(s) IntraMuscular once PRN  insulin glargine Injectable (LANTUS) 5 Unit(s) SubCutaneous at bedtime  insulin lispro (HumaLOG) corrective regimen sliding scale   SubCutaneous three times a day before meals  insulin lispro (HumaLOG) corrective regimen sliding scale   SubCutaneous at bedtime  insulin lispro Injectable (HumaLOG) 2 Unit(s) SubCutaneous three times a day before meals      SOCIAL HISTORY:    FAMILY HISTORY:  No pertinent family history in first degree relatives      REVIEW OF SYSTEMS  [  ] ROS unobtainable because:    [  ] All other systems negative except as noted below:	    Constitutional:  [ ] fever [ ] chills  [ ] weight loss  [ ] weakness  Skin:  [ ] rash [ ] phlebitis	  Eyes: [ ] icterus [ ] pain  [ ] discharge	  ENMT: [ ] sore throat  [ ] thrush [ ] ulcers [ ] exudates  Respiratory: [ ] dyspnea [ ] hemoptysis [ ] cough [ ] sputum	  Cardiovascular:  [ ] chest pain [ ] palpitations [ ] edema	  Gastrointestinal:  [ ] nausea [ ] vomiting [ ] diarrhea [ ] constipation [ ] pain	  Genitourinary:  [ ] dysuria [ ] frequency [ ] hematuria [ ] discharge [ ] flank pain  [ ] incontinence  Musculoskeletal:  [ ] myalgias [ ] arthralgias [ ] arthritis  [ ] back pain  Neurological:  [ ] headache [ ] seizures  [ ] confusion/altered mental status  Psychiatric:  [ ] anxiety [ ] depression	  Hematology/Lymphatics:  [ ] lymphadenopathy  Endocrine:  [ ] adrenal [ ] thyroid  Allergic/Immunologic:	 [ ] transplant [ ] seasonal    PHYSICAL EXAM:  General: [ ] non-toxic  HEAD/EYES: [ ] PERRL [ ] white sclera [ ] icterus  ENT:  [ ] normal [ ] supple [ ] thrush [ ] pharyngeal exudate  Cardiovascular:   [ ] murmur [ ] normal [ ] PPM/AICD  Respiratory:  [ ] clear to ausculation bilaterally  GI:  [ ] soft, non-tender, normal bowel sounds  :  [ ] willard [ ] no CVA tenderness   Musculoskeletal:  [ ] no synovitis  Neurologic:  [ ] non-focal exam   Skin:  [ ] no rash  Lymph: [ ] no lymphadenopathy  Psychiatric:  [ ] appropriate affect [ ] alert & oriented  Lines:  [ ] no phlebitis [ ] central line          Drug Dosing Weight  Height (cm): 167.64 (17 Aug 2019 06:15)  Weight (kg): 77.1 (17 Aug 2019 06:15)  BMI (kg/m2): 27.4 (17 Aug 2019 06:15)  BSA (m2): 1.87 (17 Aug 2019 06:15)    Vital Signs Last 24 Hrs  T(F): 99 (09-10-19 @ 05:21), Max: 99.4 (09-09-19 @ 04:58)    Vital Signs Last 24 Hrs  HR: 76 (09-10-19 @ 05:21) (76 - 81)  BP: 102/58 (09-10-19 @ 05:21) (102/58 - 128/68)  RR: 16 (09-10-19 @ 05:21)  SpO2: 99% (09-10-19 @ 05:21) (98% - 100%)  Wt(kg): --                          9.5    6.38  )-----------( 386      ( 10 Sep 2019 05:42 )             30.3       09-10    136  |  97<L>  |  19  ----------------------------<  348<H>  4.5   |  28  |  1.12    Ca    8.6      10 Sep 2019 05:42  Phos  3.5     09-10  Mg     1.8     09-10    TPro  7.2  /  Alb  3.0<L>  /  TBili  0.4  /  DBili  x   /  AST  15  /  ALT  14  /  AlkPhos  58  09-09          MICROBIOLOGY:  Culture - Wound (09.09.19 @ 17:19)    Culture - Wound:   CULTURE IN PROGRESS, FURTHER REPORT TO FOLLOW.    Specimen Source: LEG - LEFT        RADIOLOGY:    < from: MR Foot No Cont, Left (08.17.19 @ 12:01) >  IMPRESSION:    Osteomyelitis of the distal first phalanx. Fluid within the extensor   tendon sheath, which may represent infectious tenosynovitis.  Focus of susceptibility within the plantar soft tissues at the level of   the first web space, consistent with radiopaque foreign body.    < end of copied text >

## 2019-09-10 NOTE — PROGRESS NOTE ADULT - SUBJECTIVE AND OBJECTIVE BOX
VASCULAR SURGERY DAILY PROGRESS NOTE:       Subjective: Patient examined at bedside. No acute events overnight.       Objective:    Vital Signs Last 24 Hrs  T(C): 37.2 (10 Sep 2019 05:21), Max: 37.2 (10 Sep 2019 05:21)  T(F): 99 (10 Sep 2019 05:21), Max: 99 (10 Sep 2019 05:21)  HR: 76 (10 Sep 2019 05:21) (76 - 81)  BP: 102/58 (10 Sep 2019 05:21) (102/58 - 128/68)  BP(mean): --  RR: 16 (10 Sep 2019 05:21) (16 - 18)  SpO2: 99% (10 Sep 2019 05:21) (98% - 100%)    I&O's Detail      Labaratory Results:                          9.5    6.38  )-----------( 386      ( 10 Sep 2019 05:42 )             30.3     09-10    136  |  97<L>  |  19  ----------------------------<  348<H>  4.5   |  28  |  1.12    Ca    8.6      10 Sep 2019 05:42  Phos  3.5     09-10  Mg     1.8     09-10    TPro  7.2  /  Alb  3.0<L>  /  TBili  0.4  /  DBili  x   /  AST  15  /  ALT  14  /  AlkPhos  58  09-09    PT/INR - ( 09 Sep 2019 03:30 )   PT: 15.7 SEC;   INR: 1.36          PTT - ( 09 Sep 2019 03:30 )  PTT:34.6 SEC      Radiology and Additional Tests:    Medications:    MEDICATIONS  (STANDING):  dextrose 5%. 1000 milliLiter(s) (50 mL/Hr) IV Continuous <Continuous>  dextrose 50% Injectable 12.5 Gram(s) IV Push once  dextrose 50% Injectable 25 Gram(s) IV Push once  dextrose 50% Injectable 25 Gram(s) IV Push once  insulin glargine Injectable (LANTUS) 5 Unit(s) SubCutaneous at bedtime  insulin lispro (HumaLOG) corrective regimen sliding scale   SubCutaneous three times a day before meals  insulin lispro (HumaLOG) corrective regimen sliding scale   SubCutaneous at bedtime  insulin lispro Injectable (HumaLOG) 2 Unit(s) SubCutaneous three times a day before meals  piperacillin/tazobactam IVPB.. 3.375 Gram(s) IV Intermittent every 8 hours  vancomycin  IVPB 1000 milliGRAM(s) IV Intermittent every 12 hours    MEDICATIONS  (PRN):  dextrose 40% Gel 15 Gram(s) Oral once PRN Blood Glucose LESS THAN 70 milliGRAM(s)/deciliter  glucagon  Injectable 1 milliGRAM(s) IntraMuscular once PRN Glucose LESS THAN 70 milligrams/deciliter                  Physical Exam    General: NAD, Sitting in bed comfortably  Neuro: AAO  HEENT: NC/AT, EOMI  Neck: Soft, supple  Cardio: NSR  Resp: Good effort, No distress  GI/Abd: Soft, NTND, no rebound/guarding, no masses palpated  Vascular: B/l radial pulses palpable, b/l DP/PT palpable  Skin: Large wound of LLE 1st toe, plantar surface. Pods dressing removed, fresh blood noted. Mild foul smell. No obvious pus. Tender.  Lymphatic/Nodes: No palpable lymphadenopathy  Musculoskeletal: All 4 extremities moving spontaneously, no limitations VASCULAR SURGERY DAILY PROGRESS NOTE:       Subjective: Patient examined at bedside. No acute events overnight.       Objective: pt w/o new c/o     Vital Signs Last 24 Hrs  T(C): 37.2 (10 Sep 2019 05:21), Max: 37.2 (10 Sep 2019 05:21)  T(F): 99 (10 Sep 2019 05:21), Max: 99 (10 Sep 2019 05:21)  HR: 76 (10 Sep 2019 05:21) (76 - 81)  BP: 102/58 (10 Sep 2019 05:21) (102/58 - 128/68)  BP(mean): --  RR: 16 (10 Sep 2019 05:21) (16 - 18)  SpO2: 99% (10 Sep 2019 05:21) (98% - 100%)    I&O's Detail      Labaratory Results:                          9.5    6.38  )-----------( 386      ( 10 Sep 2019 05:42 )             30.3     09-10    136  |  97<L>  |  19  ----------------------------<  348<H>  4.5   |  28  |  1.12    Ca    8.6      10 Sep 2019 05:42  Phos  3.5     09-10  Mg     1.8     09-10    TPro  7.2  /  Alb  3.0<L>  /  TBili  0.4  /  DBili  x   /  AST  15  /  ALT  14  /  AlkPhos  58  09-09    PT/INR - ( 09 Sep 2019 03:30 )   PT: 15.7 SEC;   INR: 1.36          PTT - ( 09 Sep 2019 03:30 )  PTT:34.6 SEC      Radiology and Additional Tests:    Medications:    MEDICATIONS  (STANDING):  dextrose 5%. 1000 milliLiter(s) (50 mL/Hr) IV Continuous <Continuous>  dextrose 50% Injectable 12.5 Gram(s) IV Push once  dextrose 50% Injectable 25 Gram(s) IV Push once  dextrose 50% Injectable 25 Gram(s) IV Push once  insulin glargine Injectable (LANTUS) 5 Unit(s) SubCutaneous at bedtime  insulin lispro (HumaLOG) corrective regimen sliding scale   SubCutaneous three times a day before meals  insulin lispro (HumaLOG) corrective regimen sliding scale   SubCutaneous at bedtime  insulin lispro Injectable (HumaLOG) 2 Unit(s) SubCutaneous three times a day before meals  piperacillin/tazobactam IVPB.. 3.375 Gram(s) IV Intermittent every 8 hours  vancomycin  IVPB 1000 milliGRAM(s) IV Intermittent every 12 hours    MEDICATIONS  (PRN):  dextrose 40% Gel 15 Gram(s) Oral once PRN Blood Glucose LESS THAN 70 milliGRAM(s)/deciliter  glucagon  Injectable 1 milliGRAM(s) IntraMuscular once PRN Glucose LESS THAN 70 milligrams/deciliter      Physical Exam    General: NAD, Sitting in bed comfortably  Neuro: AAO  HEENT: NC/AT, EOMI  Neck: Soft, supple  Cardio: NSR  Resp: Good effort, No distress  GI/Abd: Soft, NTND, no rebound/guarding, no masses palpated  Vascular: B/l radial pulses palpable, b/l DP/PT palpable  Skin: Large wound of LLE 1st toe, plantar surface. Pods dressing removed, fresh blood noted. Mild foul smell. No obvious pus. Tender.  Lymphatic/Nodes: No palpable lymphadenopathy  Musculoskeletal: All 4 extremities moving spontaneously, no limitations      DAGMAR/PVR reviewed

## 2019-09-11 ENCOUNTER — TRANSCRIPTION ENCOUNTER (OUTPATIENT)
Age: 39
End: 2019-09-11

## 2019-09-11 DIAGNOSIS — E55.9 VITAMIN D DEFICIENCY, UNSPECIFIED: ICD-10-CM

## 2019-09-11 DIAGNOSIS — E11.319 TYPE 2 DIABETES MELLITUS WITH UNSPECIFIED DIABETIC RETINOPATHY WITHOUT MACULAR EDEMA: ICD-10-CM

## 2019-09-11 DIAGNOSIS — E78.5 HYPERLIPIDEMIA, UNSPECIFIED: ICD-10-CM

## 2019-09-11 DIAGNOSIS — I10 ESSENTIAL (PRIMARY) HYPERTENSION: ICD-10-CM

## 2019-09-11 LAB
ANION GAP SERPL CALC-SCNC: 10 MMO/L — SIGNIFICANT CHANGE UP (ref 7–14)
BUN SERPL-MCNC: 16 MG/DL — SIGNIFICANT CHANGE UP (ref 7–23)
CALCIUM SERPL-MCNC: 8.7 MG/DL — SIGNIFICANT CHANGE UP (ref 8.4–10.5)
CHLORIDE SERPL-SCNC: 102 MMOL/L — SIGNIFICANT CHANGE UP (ref 98–107)
CO2 SERPL-SCNC: 26 MMOL/L — SIGNIFICANT CHANGE UP (ref 22–31)
CREAT SERPL-MCNC: 1.1 MG/DL — SIGNIFICANT CHANGE UP (ref 0.5–1.3)
GLUCOSE BLDC GLUCOMTR-MCNC: 135 MG/DL — HIGH (ref 70–99)
GLUCOSE BLDC GLUCOMTR-MCNC: 169 MG/DL — HIGH (ref 70–99)
GLUCOSE BLDC GLUCOMTR-MCNC: 175 MG/DL — HIGH (ref 70–99)
GLUCOSE BLDC GLUCOMTR-MCNC: 277 MG/DL — HIGH (ref 70–99)
GLUCOSE SERPL-MCNC: 174 MG/DL — HIGH (ref 70–99)
HCT VFR BLD CALC: 29.4 % — LOW (ref 39–50)
HGB BLD-MCNC: 9.2 G/DL — LOW (ref 13–17)
MAGNESIUM SERPL-MCNC: 1.7 MG/DL — SIGNIFICANT CHANGE UP (ref 1.6–2.6)
MCHC RBC-ENTMCNC: 27.1 PG — SIGNIFICANT CHANGE UP (ref 27–34)
MCHC RBC-ENTMCNC: 31.3 % — LOW (ref 32–36)
MCV RBC AUTO: 86.7 FL — SIGNIFICANT CHANGE UP (ref 80–100)
NRBC # FLD: 0 K/UL — SIGNIFICANT CHANGE UP (ref 0–0)
PHOSPHATE SERPL-MCNC: 3.4 MG/DL — SIGNIFICANT CHANGE UP (ref 2.5–4.5)
PLATELET # BLD AUTO: 419 K/UL — HIGH (ref 150–400)
PMV BLD: 9.3 FL — SIGNIFICANT CHANGE UP (ref 7–13)
POTASSIUM SERPL-MCNC: 4 MMOL/L — SIGNIFICANT CHANGE UP (ref 3.5–5.3)
POTASSIUM SERPL-SCNC: 4 MMOL/L — SIGNIFICANT CHANGE UP (ref 3.5–5.3)
RBC # BLD: 3.39 M/UL — LOW (ref 4.2–5.8)
RBC # FLD: 12.8 % — SIGNIFICANT CHANGE UP (ref 10.3–14.5)
SODIUM SERPL-SCNC: 138 MMOL/L — SIGNIFICANT CHANGE UP (ref 135–145)
VANCOMYCIN TROUGH SERPL-MCNC: 14.9 UG/ML — SIGNIFICANT CHANGE UP (ref 10–20)
WBC # BLD: 6.32 K/UL — SIGNIFICANT CHANGE UP (ref 3.8–10.5)
WBC # FLD AUTO: 6.32 K/UL — SIGNIFICANT CHANGE UP (ref 3.8–10.5)

## 2019-09-11 PROCEDURE — 99223 1ST HOSP IP/OBS HIGH 75: CPT

## 2019-09-11 PROCEDURE — 99233 SBSQ HOSP IP/OBS HIGH 50: CPT

## 2019-09-11 PROCEDURE — 99232 SBSQ HOSP IP/OBS MODERATE 35: CPT

## 2019-09-11 RX ORDER — SODIUM CHLORIDE 9 MG/ML
1000 INJECTION INTRAMUSCULAR; INTRAVENOUS; SUBCUTANEOUS
Refills: 0 | Status: DISCONTINUED | OUTPATIENT
Start: 2019-09-11 | End: 2019-09-12

## 2019-09-11 RX ORDER — INSULIN GLARGINE 100 [IU]/ML
8 INJECTION, SOLUTION SUBCUTANEOUS AT BEDTIME
Refills: 0 | Status: DISCONTINUED | OUTPATIENT
Start: 2019-09-11 | End: 2019-09-12

## 2019-09-11 RX ORDER — ERGOCALCIFEROL 1.25 MG/1
50000 CAPSULE ORAL
Refills: 0 | Status: DISCONTINUED | OUTPATIENT
Start: 2019-09-11 | End: 2019-09-12

## 2019-09-11 RX ADMIN — Medication 200 MILLIGRAM(S): at 17:03

## 2019-09-11 RX ADMIN — Medication 3 UNIT(S): at 11:47

## 2019-09-11 RX ADMIN — Medication 1: at 21:13

## 2019-09-11 RX ADMIN — Medication 3 UNIT(S): at 07:47

## 2019-09-11 RX ADMIN — Medication 250 MILLIGRAM(S): at 17:07

## 2019-09-11 RX ADMIN — CILOSTAZOL 50 MILLIGRAM(S): 100 TABLET ORAL at 06:45

## 2019-09-11 RX ADMIN — Medication 250 MILLIGRAM(S): at 07:49

## 2019-09-11 RX ADMIN — Medication 1: at 11:47

## 2019-09-11 RX ADMIN — Medication 1: at 07:47

## 2019-09-11 RX ADMIN — Medication 3 UNIT(S): at 17:01

## 2019-09-11 RX ADMIN — CILOSTAZOL 50 MILLIGRAM(S): 100 TABLET ORAL at 18:27

## 2019-09-11 RX ADMIN — INSULIN GLARGINE 8 UNIT(S): 100 INJECTION, SOLUTION SUBCUTANEOUS at 21:14

## 2019-09-11 RX ADMIN — Medication 200 MILLIGRAM(S): at 06:46

## 2019-09-11 RX ADMIN — ERGOCALCIFEROL 50000 UNIT(S): 1.25 CAPSULE ORAL at 17:03

## 2019-09-11 NOTE — CONSULT NOTE ADULT - ATTENDING COMMENTS
Elizabet Mckinney MD   Pager # [phone]  On evenings and weekends, please call the office at [phone] or page endocrine fellow on call. Please note that this patient may be followed by different provider tomorrow. If no answer, contact the office. Elizabet Mckinney MD   Pager # [phone]. PLEASE PAGE TO A 7 DIGIT NUMBER.   On evenings and weekends, please call the office at [phone] or page endocrine fellow on call. Please note that this patient may be followed by different provider tomorrow. If no answer, contact the office.

## 2019-09-11 NOTE — CONSULT NOTE ADULT - ASSESSMENT
39 year old male with uncontrolled DM2, HTN, HLD, vitamin D deficiency, and history of left toe amputation (December 2018), here with osteomyelitis of the left foot, also with hyperglycemia in setting of uncontrolled DM2. BG goal is 100-180 mg/dL. 39 year old male with uncontrolled DM2, HTN, HLD, vitamin D deficiency, and history of left toe amputation (December 2018), here with osteomyelitis of the left foot, also with hyperglycemia in setting of uncontrolled DM2. BG goal is 100-180 mg/dL. Patient is a high risk patient with high level decision making.

## 2019-09-11 NOTE — PROGRESS NOTE ADULT - SUBJECTIVE AND OBJECTIVE BOX
Patient is a 39y old  Male who presents with a chief complaint of Left foot pain and swelling (11 Sep 2019 10:35)       INTERVAL HPI/OVERNIGHT EVENTS:  Patient seen and evaluated at bedside.  Pt is resting comfortable in NAD. Denies N/V/F/C.   Allergies    No Known Allergies    Intolerances        Vital Signs Last 24 Hrs  T(C): 37.5 (11 Sep 2019 06:17), Max: 37.5 (11 Sep 2019 06:17)  T(F): 99.5 (11 Sep 2019 06:17), Max: 99.5 (11 Sep 2019 06:17)  HR: 80 (11 Sep 2019 06:17) (80 - 83)  BP: 107/58 (11 Sep 2019 06:17) (96/55 - 107/60)  BP(mean): --  RR: 17 (11 Sep 2019 06:17) (17 - 17)  SpO2: 100% (11 Sep 2019 06:17) (97% - 100%)    LABS:                        9.2    6.32  )-----------( 419      ( 11 Sep 2019 05:30 )             29.4     09-11    138  |  102  |  16  ----------------------------<  174<H>  4.0   |  26  |  1.10    Ca    8.7      11 Sep 2019 05:30  Phos  3.4     09-11  Mg     1.7     09-11          CAPILLARY BLOOD GLUCOSE      POCT Blood Glucose.: 175 mg/dL (11 Sep 2019 07:21)  POCT Blood Glucose.: 276 mg/dL (10 Sep 2019 21:43)  POCT Blood Glucose.: 258 mg/dL (10 Sep 2019 16:52)  POCT Blood Glucose.: 187 mg/dL (10 Sep 2019 11:56)      Lower Extremity Physical Exam:  Vascular: DP/PT 2/4, B/L, CFT <3 seconds B/L, Temperature gradient warm to warm B/L.   Neuro: Epicritic sensation absent to the level of midfoot, B/L.  Musculoskeletal/Ortho: s/p LF partial fourth ray resection  Skin: left foot open wound plantar hallux and medial 2nd digit, swelling and erythema to entire forefoot    Wound #1:   Location: left foot plantar hallux  Size: 5.0cm x 5.0cm  Depth: to bone  Wound bed: fibronecrotic  Drainage: purulent  Odor: malodorous  Periwound: hyperkeratotic  Etiology: pressure, DMT2

## 2019-09-11 NOTE — PROGRESS NOTE ADULT - PROBLEM SELECTOR PLAN 1
cont present abx , ID and Vascular eval   vascular studies  podiatry evaluated pt OM pos resection and biopsy of 2nd met   pt medically cleared for OR

## 2019-09-11 NOTE — PROGRESS NOTE ADULT - SUBJECTIVE AND OBJECTIVE BOX
Follow Up:      Inverval History/ROS:Patient is a 39y old  Male who presents with a chief complaint of Left foot pain and swelling (11 Sep 2019 11:09)    No pain No fever    Allergies    No Known Allergies    Intolerances        ANTIMICROBIALS:  ciprofloxacin   IVPB 400 every 12 hours  vancomycin  IVPB 1000 every 12 hours      OTHER MEDS:  cilostazol 50 milliGRAM(s) Oral two times a day  dextrose 40% Gel 15 Gram(s) Oral once PRN  dextrose 5%. 1000 milliLiter(s) IV Continuous <Continuous>  dextrose 50% Injectable 12.5 Gram(s) IV Push once  dextrose 50% Injectable 25 Gram(s) IV Push once  dextrose 50% Injectable 25 Gram(s) IV Push once  ergocalciferol 25816 Unit(s) Oral <User Schedule>  glucagon  Injectable 1 milliGRAM(s) IntraMuscular once PRN  insulin glargine Injectable (LANTUS) 8 Unit(s) SubCutaneous at bedtime  insulin lispro (HumaLOG) corrective regimen sliding scale   SubCutaneous three times a day before meals  insulin lispro (HumaLOG) corrective regimen sliding scale   SubCutaneous at bedtime  insulin lispro Injectable (HumaLOG) 3 Unit(s) SubCutaneous three times a day before meals  sodium chloride 0.9%. 1000 milliLiter(s) IV Continuous <Continuous>      Vital Signs Last 24 Hrs  T(C): 37.5 (11 Sep 2019 06:17), Max: 37.5 (11 Sep 2019 06:17)  T(F): 99.5 (11 Sep 2019 06:17), Max: 99.5 (11 Sep 2019 06:17)  HR: 80 (11 Sep 2019 06:17) (80 - 83)  BP: 107/58 (11 Sep 2019 06:17) (96/55 - 107/60)  BP(mean): --  RR: 17 (11 Sep 2019 06:17) (17 - 17)  SpO2: 100% (11 Sep 2019 06:17) (97% - 100%)    PHYSICAL EXAM:  General: [ x] non-toxic  HEAD/EYES: [ ] PERRL x[ ] white sclera [ ] icterus  ENT:  [ ] normal [x ] supple [ ] thrush [ ] pharyngeal exudate  Cardiovascular:   [ ] murmur [ x] normal [ ] PPM/AICD  Respiratory:  [x ] clear to ausculation bilaterally  GI:  [ x] soft, non-tender, normal bowel sounds  :  [ ] willard [ x] no CVA tenderness   Musculoskeletal:  [ ] no synovitis  Neurologic:  [ ] non-focal exam   Skin:  x[ ] no rash  Lymph: [ ] no lymphadenopathy  Psychiatric:  [ ] appropriate affect [x ] alert & oriented  Lines:  [ x] no phlebitis [ ] central line                                9.2    6.32  )-----------( 419      ( 11 Sep 2019 05:30 )             29.4       09-11    138  |  102  |  16  ----------------------------<  174<H>  4.0   |  26  |  1.10    Ca    8.7      11 Sep 2019 05:30  Phos  3.4     09-11  Mg     1.7     09-11            MICROBIOLOGY:    RADIOLOGY:

## 2019-09-11 NOTE — PROGRESS NOTE ADULT - SUBJECTIVE AND OBJECTIVE BOX
SUBJECTIVE / OVERNIGHT EVENTS: pt denies chest pain, shortness of breath     MEDICATIONS  (STANDING):  cilostazol 50 milliGRAM(s) Oral two times a day  ciprofloxacin   IVPB 400 milliGRAM(s) IV Intermittent every 12 hours  dextrose 5%. 1000 milliLiter(s) (50 mL/Hr) IV Continuous <Continuous>  dextrose 50% Injectable 12.5 Gram(s) IV Push once  dextrose 50% Injectable 25 Gram(s) IV Push once  dextrose 50% Injectable 25 Gram(s) IV Push once  ergocalciferol 74286 Unit(s) Oral <User Schedule>  insulin glargine Injectable (LANTUS) 8 Unit(s) SubCutaneous at bedtime  insulin lispro (HumaLOG) corrective regimen sliding scale   SubCutaneous three times a day before meals  insulin lispro (HumaLOG) corrective regimen sliding scale   SubCutaneous at bedtime  insulin lispro Injectable (HumaLOG) 3 Unit(s) SubCutaneous three times a day before meals  sodium chloride 0.9%. 1000 milliLiter(s) (30 mL/Hr) IV Continuous <Continuous>  vancomycin  IVPB 1000 milliGRAM(s) IV Intermittent every 12 hours    MEDICATIONS  (PRN):  dextrose 40% Gel 15 Gram(s) Oral once PRN Blood Glucose LESS THAN 70 milliGRAM(s)/deciliter  glucagon  Injectable 1 milliGRAM(s) IntraMuscular once PRN Glucose LESS THAN 70 milligrams/deciliter    Vital Signs Last 24 Hrs  T(C): 37.4 (11 Sep 2019 21:29), Max: 37.5 (11 Sep 2019 06:17)  T(F): 99.3 (11 Sep 2019 21:29), Max: 99.5 (11 Sep 2019 06:17)  HR: 88 (11 Sep 2019 21:29) (80 - 96)  BP: 124/79 (11 Sep 2019 21:29) (107/58 - 128/68)  BP(mean): --  RR: 17 (11 Sep 2019 21:29) (17 - 17)  SpO2: 100% (11 Sep 2019 21:29) (100% - 100%)  Constitutional: No fever, fatigue  Skin: No rash.  Eyes: No recent vision problems or eye pain.  ENT: No congestion, ear pain, or sore throat.  Cardiovascular: No chest pain or palpation.  Respiratory: No cough, shortness of breath, congestion, or wheezing.  Gastrointestinal: No abdominal pain, nausea, vomiting, or diarrhea.  Genitourinary: No dysuria.  Musculoskeletal: No joint swelling.  Neurologic: No headache.    PHYSICAL EXAM:  GENERAL: NAD  EYES: EOMI, PERRLA  NECK: Supple, No JVD  CHEST/LUNG: cta charan   HEART:  S1 , S2 +  ABDOMEN: soft , bs+  EXTREMITIES: left  foot dressing+  NEUROLOGY:alert awake oriented       LABS:  09-11    138  |  102  |  16  ----------------------------<  174<H>  4.0   |  26  |  1.10    Ca    8.7      11 Sep 2019 05:30  Phos  3.4     09-11  Mg     1.7     09-11      Creatinine Trend: 1.10 <--, 1.12 <--, 1.25 <--                        9.2    6.32  )-----------( 419      ( 11 Sep 2019 05:30 )             29.4     Urine Studies:                  Imaging Personally Reviewed:    Consultant(s) Notes Reviewed:      Care Discussed with Consultants/Other Providers:

## 2019-09-11 NOTE — PROGRESS NOTE ADULT - ASSESSMENT
39 year old with dm and foot ulcer, prior diagnosis of Om of foot, presents with fever and foot pain with non-healing wound      1) Foot ulcer  prior cultures with citrobacter and acinitobacter    Continue Cipro    Can continue vancomycin pending additional culture data    2) Osteomyelitis of foot  Plan is for OR debridement later this week      3) DM: glycemic control key to wound healing      4) Therapeutic drug monitoring  Check vancomycin through before 4th dose

## 2019-09-11 NOTE — PROGRESS NOTE ADULT - ASSESSMENT
Assessment/Plan: 39M with likely acute worsening of chronic LLE hallux wound. Palpable pulses with likely small vessel disease 2/2 DM.    - Cont IV ABX   - Continue local wound care  - Follow podiatry recommendations  - pt is cleared from vasc surg  standpoint for podiatric intervention  recommend pletal 50 bid   will follow

## 2019-09-11 NOTE — PROGRESS NOTE ADULT - ASSESSMENT
39M w/ PMHx of DM w/ c/c of LF plantar hallux wound with swelling and pain  - Pt seen and evaluated   - LF XR showing OM to the left hallux  - Left foot plantar hallux dactylitis w/ wound down to bone. Wound bed is fibronecrotic, scant purulent discharge, edema tracking proximally to level of ankle. Left medial 2nd toe wound to subQ with dactylitis   - Flushed and dressed with betadine, 4x4 gauze, and kenneth dressing  - cont IV abx   - Booked for surgery on Thursday for OM resection and biopsy of 2nd met with Dr. Izquierdo  - Please document in chart medical optimization/clearance for local with light sedation   - seen with attending

## 2019-09-11 NOTE — CONSULT NOTE ADULT - PROBLEM SELECTOR RECOMMENDATION 9
- fasting BG improved this AM  - for now, would monitor on Humalog 3 units TID and Lantus 10 units qhs  - c/w low correction scale qac and qhs  - consistent carb diet  - patient does not have typical phenotype of DM2 (not signs of insulin resistance on physical exam, no BMI recorded but appears to have normal BMI on exam, no family history of DM), thus would rule out DAWIT/DM1. Check islet cell, HAIM and zinc transporter Ab  - will follow  - for discharge: likely basal bolus insulin, doses to be determined. Will need to determine how patient can store his insulin at his shelter. If his insulin requirements are low and his antibodies are negative, can consider dc on basal + oral agents. final plan to be determined. He can follow up in the office on discharge - [phone]. - fasting BG improved this AM  - for now, would monitor on Humalog 3 units TID and Lantus 10 units qhs. If NPO tonight, would reduce Lantus to 8 units qhs  - c/w low correction scale qac and qhs  - consistent carb diet  - patient does not have typical phenotype of DM2 (not signs of insulin resistance on physical exam, no BMI recorded but appears to have normal BMI on exam, no family history of DM), thus would rule out DAWIT/DM1. Check islet cell, HAIM and zinc transporter Ab  - will follow  - for discharge: likely basal bolus insulin, doses to be determined. Will need to determine how patient can store his insulin at his shelter. If his insulin requirements are low and his antibodies are negative, can consider dc on basal + oral agents. final plan to be determined. He can follow up in the office on discharge - [phone].

## 2019-09-11 NOTE — PROGRESS NOTE ADULT - SUBJECTIVE AND OBJECTIVE BOX
Patient is a 39y old  Male who presents with a chief complaint of Left foot pain and swelling (11 Sep 2019 11:20)      Vascular Surgery Attending Progress Note    Interval HPI: pt w/o new c/o     Medications:  cilostazol 50 milliGRAM(s) Oral two times a day  ciprofloxacin   IVPB 400 milliGRAM(s) IV Intermittent every 12 hours  dextrose 40% Gel 15 Gram(s) Oral once PRN  dextrose 5%. 1000 milliLiter(s) IV Continuous <Continuous>  dextrose 50% Injectable 12.5 Gram(s) IV Push once  dextrose 50% Injectable 25 Gram(s) IV Push once  dextrose 50% Injectable 25 Gram(s) IV Push once  ergocalciferol 97245 Unit(s) Oral <User Schedule>  glucagon  Injectable 1 milliGRAM(s) IntraMuscular once PRN  insulin glargine Injectable (LANTUS) 8 Unit(s) SubCutaneous at bedtime  insulin lispro (HumaLOG) corrective regimen sliding scale   SubCutaneous three times a day before meals  insulin lispro (HumaLOG) corrective regimen sliding scale   SubCutaneous at bedtime  insulin lispro Injectable (HumaLOG) 3 Unit(s) SubCutaneous three times a day before meals  sodium chloride 0.9%. 1000 milliLiter(s) IV Continuous <Continuous>  vancomycin  IVPB 1000 milliGRAM(s) IV Intermittent every 12 hours      Vital Signs Last 24 Hrs  T(C): 37.1 (11 Sep 2019 14:45), Max: 37.5 (11 Sep 2019 06:17)  T(F): 98.8 (11 Sep 2019 14:45), Max: 99.5 (11 Sep 2019 06:17)  HR: 96 (11 Sep 2019 14:45) (80 - 96)  BP: 128/68 (11 Sep 2019 14:45) (107/58 - 128/68)  BP(mean): --  RR: 17 (11 Sep 2019 14:45) (17 - 17)  SpO2: 100% (11 Sep 2019 14:45) (97% - 100%)  I&O's Summary    11 Sep 2019 07:01  -  11 Sep 2019 18:14  --------------------------------------------------------  IN: 0 mL / OUT: 400 mL / NET: -400 mL      Physical Exam:  Neuro  A&Ox3 VSS  Vascular:  stable dressing c/d/i    LABS:                        9.2    6.32  )-----------( 419      ( 11 Sep 2019 05:30 )             29.4     09-11    138  |  102  |  16  ----------------------------<  174<H>  4.0   |  26  |  1.10    Ca    8.7      11 Sep 2019 05:30  Phos  3.4     09-11  Mg     1.7     09-11    DAGMAR/PVR reviewed       ABE COBURN MD  [phone]

## 2019-09-12 ENCOUNTER — RESULT REVIEW (OUTPATIENT)
Age: 39
End: 2019-09-12

## 2019-09-12 LAB
ANION GAP SERPL CALC-SCNC: 13 MMO/L — SIGNIFICANT CHANGE UP (ref 7–14)
APTT BLD: 34.4 SEC — SIGNIFICANT CHANGE UP (ref 27.5–36.3)
BLD GP AB SCN SERPL QL: NEGATIVE — SIGNIFICANT CHANGE UP
BUN SERPL-MCNC: 11 MG/DL — SIGNIFICANT CHANGE UP (ref 7–23)
CALCIUM SERPL-MCNC: 8.8 MG/DL — SIGNIFICANT CHANGE UP (ref 8.4–10.5)
CHLORIDE SERPL-SCNC: 101 MMOL/L — SIGNIFICANT CHANGE UP (ref 98–107)
CO2 SERPL-SCNC: 26 MMOL/L — SIGNIFICANT CHANGE UP (ref 22–31)
CREAT SERPL-MCNC: 1.15 MG/DL — SIGNIFICANT CHANGE UP (ref 0.5–1.3)
GLUCOSE BLDC GLUCOMTR-MCNC: 120 MG/DL — HIGH (ref 70–99)
GLUCOSE BLDC GLUCOMTR-MCNC: 155 MG/DL — HIGH (ref 70–99)
GLUCOSE BLDC GLUCOMTR-MCNC: 160 MG/DL — HIGH (ref 70–99)
GLUCOSE BLDC GLUCOMTR-MCNC: 161 MG/DL — HIGH (ref 70–99)
GLUCOSE BLDC GLUCOMTR-MCNC: 170 MG/DL — HIGH (ref 70–99)
GLUCOSE SERPL-MCNC: 169 MG/DL — HIGH (ref 70–99)
GRAM STN WND: SIGNIFICANT CHANGE UP
GRAM STN WND: SIGNIFICANT CHANGE UP
HCT VFR BLD CALC: 31.1 % — LOW (ref 39–50)
HGB BLD-MCNC: 9.6 G/DL — LOW (ref 13–17)
INR BLD: 1.23 — HIGH (ref 0.88–1.17)
MCHC RBC-ENTMCNC: 27.1 PG — SIGNIFICANT CHANGE UP (ref 27–34)
MCHC RBC-ENTMCNC: 30.9 % — LOW (ref 32–36)
MCV RBC AUTO: 87.9 FL — SIGNIFICANT CHANGE UP (ref 80–100)
METHOD TYPE: SIGNIFICANT CHANGE UP
METHOD TYPE: SIGNIFICANT CHANGE UP
NRBC # FLD: 0 K/UL — SIGNIFICANT CHANGE UP (ref 0–0)
ORGANISM # SPEC MICROSCOPIC CNT: SIGNIFICANT CHANGE UP
PLATELET # BLD AUTO: 458 K/UL — HIGH (ref 150–400)
PMV BLD: 9.4 FL — SIGNIFICANT CHANGE UP (ref 7–13)
POTASSIUM SERPL-MCNC: 4.1 MMOL/L — SIGNIFICANT CHANGE UP (ref 3.5–5.3)
POTASSIUM SERPL-SCNC: 4.1 MMOL/L — SIGNIFICANT CHANGE UP (ref 3.5–5.3)
PROTHROM AB SERPL-ACNC: 13.7 SEC — HIGH (ref 9.8–13.1)
RBC # BLD: 3.54 M/UL — LOW (ref 4.2–5.8)
RBC # FLD: 12.7 % — SIGNIFICANT CHANGE UP (ref 10.3–14.5)
RH IG SCN BLD-IMP: POSITIVE — SIGNIFICANT CHANGE UP
RH IG SCN BLD-IMP: POSITIVE — SIGNIFICANT CHANGE UP
SODIUM SERPL-SCNC: 140 MMOL/L — SIGNIFICANT CHANGE UP (ref 135–145)
SPECIMEN SOURCE: SIGNIFICANT CHANGE UP
SPECIMEN SOURCE: SIGNIFICANT CHANGE UP
WBC # BLD: 5.98 K/UL — SIGNIFICANT CHANGE UP (ref 3.8–10.5)
WBC # FLD AUTO: 5.98 K/UL — SIGNIFICANT CHANGE UP (ref 3.8–10.5)

## 2019-09-12 PROCEDURE — 73630 X-RAY EXAM OF FOOT: CPT | Mod: 26,LT

## 2019-09-12 PROCEDURE — 88311 DECALCIFY TISSUE: CPT | Mod: 26

## 2019-09-12 PROCEDURE — 99232 SBSQ HOSP IP/OBS MODERATE 35: CPT

## 2019-09-12 PROCEDURE — 99223 1ST HOSP IP/OBS HIGH 75: CPT

## 2019-09-12 PROCEDURE — 88305 TISSUE EXAM BY PATHOLOGIST: CPT | Mod: 26

## 2019-09-12 PROCEDURE — 93306 TTE W/DOPPLER COMPLETE: CPT | Mod: 26

## 2019-09-12 RX ORDER — DEXTROSE 50 % IN WATER 50 %
25 SYRINGE (ML) INTRAVENOUS ONCE
Refills: 0 | Status: DISCONTINUED | OUTPATIENT
Start: 2019-09-12 | End: 2019-09-23

## 2019-09-12 RX ORDER — SODIUM CHLORIDE 9 MG/ML
1000 INJECTION INTRAMUSCULAR; INTRAVENOUS; SUBCUTANEOUS
Refills: 0 | Status: DISCONTINUED | OUTPATIENT
Start: 2019-09-12 | End: 2019-09-23

## 2019-09-12 RX ORDER — INSULIN GLARGINE 100 [IU]/ML
8 INJECTION, SOLUTION SUBCUTANEOUS AT BEDTIME
Refills: 0 | Status: DISCONTINUED | OUTPATIENT
Start: 2019-09-12 | End: 2019-09-23

## 2019-09-12 RX ORDER — INSULIN LISPRO 100/ML
VIAL (ML) SUBCUTANEOUS
Refills: 0 | Status: DISCONTINUED | OUTPATIENT
Start: 2019-09-12 | End: 2019-09-23

## 2019-09-12 RX ORDER — INSULIN LISPRO 100/ML
3 VIAL (ML) SUBCUTANEOUS
Refills: 0 | Status: DISCONTINUED | OUTPATIENT
Start: 2019-09-12 | End: 2019-09-23

## 2019-09-12 RX ORDER — CIPROFLOXACIN LACTATE 400MG/40ML
400 VIAL (ML) INTRAVENOUS EVERY 12 HOURS
Refills: 0 | Status: DISCONTINUED | OUTPATIENT
Start: 2019-09-12 | End: 2019-09-23

## 2019-09-12 RX ORDER — OXYCODONE AND ACETAMINOPHEN 5; 325 MG/1; MG/1
1 TABLET ORAL EVERY 4 HOURS
Refills: 0 | Status: DISCONTINUED | OUTPATIENT
Start: 2019-09-12 | End: 2019-09-18

## 2019-09-12 RX ORDER — VANCOMYCIN HCL 1 G
1000 VIAL (EA) INTRAVENOUS EVERY 12 HOURS
Refills: 0 | Status: DISCONTINUED | OUTPATIENT
Start: 2019-09-12 | End: 2019-09-23

## 2019-09-12 RX ORDER — CILOSTAZOL 100 MG/1
50 TABLET ORAL
Refills: 0 | Status: DISCONTINUED | OUTPATIENT
Start: 2019-09-12 | End: 2019-09-23

## 2019-09-12 RX ORDER — DEXTROSE 50 % IN WATER 50 %
12.5 SYRINGE (ML) INTRAVENOUS ONCE
Refills: 0 | Status: DISCONTINUED | OUTPATIENT
Start: 2019-09-12 | End: 2019-09-23

## 2019-09-12 RX ORDER — ACETAMINOPHEN 500 MG
650 TABLET ORAL EVERY 6 HOURS
Refills: 0 | Status: DISCONTINUED | OUTPATIENT
Start: 2019-09-12 | End: 2019-09-23

## 2019-09-12 RX ORDER — DEXTROSE 50 % IN WATER 50 %
15 SYRINGE (ML) INTRAVENOUS ONCE
Refills: 0 | Status: DISCONTINUED | OUTPATIENT
Start: 2019-09-12 | End: 2019-09-23

## 2019-09-12 RX ORDER — ERGOCALCIFEROL 1.25 MG/1
50000 CAPSULE ORAL
Refills: 0 | Status: DISCONTINUED | OUTPATIENT
Start: 2019-09-12 | End: 2019-09-23

## 2019-09-12 RX ORDER — INSULIN LISPRO 100/ML
VIAL (ML) SUBCUTANEOUS AT BEDTIME
Refills: 0 | Status: DISCONTINUED | OUTPATIENT
Start: 2019-09-12 | End: 2019-09-23

## 2019-09-12 RX ORDER — INFLUENZA VIRUS VACCINE 15; 15; 15; 15 UG/.5ML; UG/.5ML; UG/.5ML; UG/.5ML
0.5 SUSPENSION INTRAMUSCULAR ONCE
Refills: 0 | Status: DISCONTINUED | OUTPATIENT
Start: 2019-09-12 | End: 2019-09-23

## 2019-09-12 RX ORDER — SODIUM CHLORIDE 9 MG/ML
1000 INJECTION, SOLUTION INTRAVENOUS
Refills: 0 | Status: DISCONTINUED | OUTPATIENT
Start: 2019-09-12 | End: 2019-09-23

## 2019-09-12 RX ORDER — GLUCAGON INJECTION, SOLUTION 0.5 MG/.1ML
1 INJECTION, SOLUTION SUBCUTANEOUS ONCE
Refills: 0 | Status: DISCONTINUED | OUTPATIENT
Start: 2019-09-12 | End: 2019-09-23

## 2019-09-12 RX ADMIN — Medication 250 MILLIGRAM(S): at 06:41

## 2019-09-12 RX ADMIN — Medication 200 MILLIGRAM(S): at 05:12

## 2019-09-12 RX ADMIN — Medication 650 MILLIGRAM(S): at 22:13

## 2019-09-12 RX ADMIN — INSULIN GLARGINE 8 UNIT(S): 100 INJECTION, SOLUTION SUBCUTANEOUS at 22:09

## 2019-09-12 RX ADMIN — Medication 250 MILLIGRAM(S): at 22:08

## 2019-09-12 RX ADMIN — CILOSTAZOL 50 MILLIGRAM(S): 100 TABLET ORAL at 05:12

## 2019-09-12 RX ADMIN — SODIUM CHLORIDE 30 MILLILITER(S): 9 INJECTION INTRAMUSCULAR; INTRAVENOUS; SUBCUTANEOUS at 18:39

## 2019-09-12 RX ADMIN — Medication 650 MILLIGRAM(S): at 23:00

## 2019-09-12 RX ADMIN — Medication 200 MILLIGRAM(S): at 18:39

## 2019-09-12 NOTE — CONSULT NOTE ADULT - CONSULT REASON
uncontrolled DM2
Left foot Hallux/2nd digit wound
Left foot wound with swelling and pain
Pre-operative cardiovascular evaluation
foot wound

## 2019-09-12 NOTE — CONSULT NOTE ADULT - SUBJECTIVE AND OBJECTIVE BOX
Cardiology/Vascular Medicine Inpatient Consultation Note      HISTORY OF PRESENT ILLNESS:  Patient is a 39 year old man with T2DM and history of left toe amputation (December 2018) presents with complaint of left foot pain and swelling. One week ago the patient was admitted at [facility] and was treated for fever and right knee inflammation and left foot swelling. He was treated with antibiotics and discharged on Augmentin 875 mg BID for 6 weeks. The patient was instructed to stay off his feet and not wear shoes, but he did not listen to this advice. He wore sneakers and went to work. His foot swelling began to worsen after this with a foul smelling odor. The patient came to the ED. He denies fevers, chills, chest pain, shortness of breath, nausea or vomiting.    Of note, the patient was diagnosed with diabetes around age 13-14. He was taking oral medication and insulin up until last month when he started experiencing urinary frequency He attributed this to one of the medications, but was not sure which it was so stopped both medications. He resumed the insulin only after his recent discharge from [facility]. He uses an insulin pen at15 units nightly. He lives in a shelter and can afford his medications.     In the ED, his vital signs were stable.  He received 1g vancomycin x1 and zosyn x1. (09 Sep 2019 10:07)          Allergies  No Known Allergies    MEDICATIONS:  cilostazol 50 milliGRAM(s) Oral two times a day  ciprofloxacin   IVPB 400 milliGRAM(s) IV Intermittent every 12 hours  vancomycin  IVPB 1000 milliGRAM(s) IV Intermittent every 12 hours  dextrose 40% Gel 15 Gram(s) Oral once PRN  dextrose 50% Injectable 12.5 Gram(s) IV Push once  dextrose 50% Injectable 25 Gram(s) IV Push once  dextrose 50% Injectable 25 Gram(s) IV Push once  glucagon  Injectable 1 milliGRAM(s) IntraMuscular once PRN  insulin glargine Injectable (LANTUS) 8 Unit(s) SubCutaneous at bedtime  insulin lispro (HumaLOG) corrective regimen sliding scale   SubCutaneous three times a day before meals  insulin lispro (HumaLOG) corrective regimen sliding scale   SubCutaneous at bedtime  insulin lispro Injectable (HumaLOG) 3 Unit(s) SubCutaneous three times a day before meals  dextrose 5%. 1000 milliLiter(s) IV Continuous <Continuous>  ergocalciferol 13864 Unit(s) Oral <User Schedule>  sodium chloride 0.9%. 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    FAMILY HISTORY:  No pertinent family history in first degree relatives    SOCIAL HISTORY:    As above    REVIEW OF SYSTEMS:  As per HPI    PHYSICAL EXAM:  T(C): 37.4 (09-11-19 @ 21:29), Max: 37.5 (09-11-19 @ 06:17)  HR: 88 (09-11-19 @ 21:29) (80 - 96)  BP: 124/79 (09-11-19 @ 21:29) (107/58 - 128/68)  RR: 17 (09-11-19 @ 21:29) (17 - 17)  SpO2: 100% (09-11-19 @ 21:29) (100% - 100%)  Wt(kg): --  I&O's Summary    11 Sep 2019 07:01  -  12 Sep 2019 04:41  --------------------------------------------------------  IN: 0 mL / OUT: 400 mL / NET: -400 mL      Appearance: NAD  HEENT:   Normal oral mucosa, PERRL, EOMI	  Cardiovascular: Normal S1 S2, No JVD, No murmurs  Respiratory: Lungs clear to auscultation	  Psychiatry: Awake, alert  Gastrointestinal:  Soft, Non-tender, + BS	  Skin: No rashes, No ecchymoses, No cyanosis	  Neurologic: Non-focal  Extremities:   Vascular:     LABS:	 	                          9.2    6.32  )-----------( 419      ( 11 Sep 2019 05:30 )             29.4     09-11    138  |  102  |  16  ----------------------------<  174<H>  4.0   |  26  |  1.10  09-10    136  |  97<L>  |  19  ----------------------------<  348<H>  4.5   |  28  |  1.12    Ca    8.7      11 Sep 2019 05:30  Ca    8.6      10 Sep 2019 05:42  Phos  3.4     09-11  Phos  3.5     09-10  Mg     1.7     09-11  Mg     1.8     09-10        < from: MR Foot w/wo IV Cont, Left (09.10.19 @ 15:32) >    EXAM:  MR FOOT WAW IC LT        PROCEDURE DATE:  Sep 10 2019         INTERPRETATION:  CLINICAL INDICATION: Wound to hallux and second toe of   the left foot. Evaluate for osteomyelitis.    TECHNIQUE:  Multiplanar and multisequence right forefoot and midfoot MRI performed  before and after administration of 7 cc of Gadavist IV without reported  complications and with 0.5 cc discarded.     COMPARISON: Left foot radiograph from 9/9/2019. MRI of the left foot   8/17/2019.    FINDINGS:    There is soft tissue defect at the distal aspect of the great toe with   underlying cortical erosions of the distal phalanx. There is replacement   of normal T1 marrow signal of the first proximal and distal phalanx with   associated marrow edema consistent with osteomyelitis. There are   additional mild marrow signal abnormality involving the second proximal,   middle, distal phalanx and third distal phalanx which can represent early   osteomyelitis. No rim-enhancing fluid collections to suggest an abscess.   Mild increase in marrow signal is noted at the medial aspect of the first   metatarsal, medial and middle cuneiforms which can be seen in setting of   stress insufficiency fracture. Fluid is again seen under the extensor   tendons and flexor hallucis longus tendon sheath suggestive of   tenosynovitis, likely infectious. Small joint effusion at the first   tarsometatarsal joint. Diffuse muscle edema is again noted which can be   due to denervation or myositis.    Unchanged focus of susceptibility artifact at the plantar aspect of the   first webspace consistent with foreign body.    Status post fourth digit distal metatarsal amputation. No fractures or   dislocations.    IMPRESSION:    Osteomyelitis of the first proximal and distal phalanx.    Less specific focal crescentic subcortical enhancing marrow signal   abnormality in medial plantar aspect of 2nd metatarsal head indeterminate   for reactive osteitis versus early or low-grade osteomyelitis.    Nonspecific patchy enhancing marrow signal abnormality of the medial and   middle cuneiforms, stress reaction due to altered biomechanics or   evolving Charcot changes is a consideration infection thought less likely.    Flexor hallucis longus tenosynovitis.    Unchanged foreign body atthe plantar aspect of the first webspace with   associated localized susceptibility artifact in the region.      OTONIEL PINEDA M.D., RADIOLOGY RESIDENT  This document has been electronically signed.  DANIEL GONZALES M.D., ATTENDING RADIOLOGIST  This document has been electronically signed. Sep 10 2019  5:51PM            < from: [facility] Physiol Extremity Lower 3+ Level, BI (08.19.19 @ 12:04) >    EXAM:  US PHYSIOL LWR EXT 3+ LEV BI                            PROCEDURE DATE:  08/19/2019          INTERPRETATION:  CLINICAL INFORMATION: Left hallux ulcer, osteomyelitis,   history of diabetes.    TECHNIQUE: Multisegmental plethysmography pressure waveform analysis with   ankle-brachial index of the bilateral lower extremities was performed on   8/19/2019 at 1201 hours.    COMPARISON: None.    FINDINGS:  Right:  DAGMAR is 1.53  Segmental pressures measure 184 mmHg in the calf and 181 mmHg in the  ankle.  There are normal waveforms in the lower thigh, calf, and ankle. There are   abnormal waveforms with decreased amplitude in the forefoot and great toe.    Left:  DAGMAR is 1.52  Segmental pressures measure 193 mmHg in the calf and 178 mmHg in the   ankle.  There are multiphasic waveforms in the lower thigh, calf, ankle and   forefoot.    IMPRESSION:  Mildly increased DAGMAR bilaterally suggestive of calcified vessels.  Abnormal waveforms with decreased amplitude in the right forefoot and   great toe suggestive crural disease.      LUIS F KANG M.D., ATTENDING RADIOLOGIST  This document has been electronically signed. Aug 19 2019 12:17PM Cardiology/Vascular Medicine Inpatient Consultation Note      HISTORY OF PRESENT ILLNESS:  Patient is a 39 year old man with T2DM and history of left toe amputation (December 2018) presents with complaint of left foot pain and swelling. One week ago the patient was admitted at [facility] and was treated for fever and right knee inflammation and left foot swelling. He was treated with antibiotics and discharged on Augmentin 875 mg BID for 6 weeks. The patient was instructed to stay off his feet and not wear shoes, but he did not listen to this advice. He wore sneakers and went to work. His foot swelling began to worsen after this with a foul smelling odor. The patient came to the ED. He denies fevers, chills, chest pain, shortness of breath, nausea or vomiting.    Of note, the patient was diagnosed with diabetes around age 13-14. He was taking oral medication and insulin up until last month when he started experiencing urinary frequency He attributed this to one of the medications, but was not sure which it was so stopped both medications. He resumed the insulin only after his recent discharge from [facility]. He uses an insulin pen at15 units nightly. He lives in a shelter and can afford his medications.     In the ED, his vital signs were stable.  He received 1g vancomycin x1 and zosyn x1. (09 Sep 2019 10:07)     Booked for surgery on Thursday for OM resection and biopsy of 2nd met with Dr. Izquierdo        Allergies  No Known Allergies    MEDICATIONS:  cilostazol 50 milliGRAM(s) Oral two times a day  ciprofloxacin   IVPB 400 milliGRAM(s) IV Intermittent every 12 hours  vancomycin  IVPB 1000 milliGRAM(s) IV Intermittent every 12 hours  dextrose 40% Gel 15 Gram(s) Oral once PRN  dextrose 50% Injectable 12.5 Gram(s) IV Push once  dextrose 50% Injectable 25 Gram(s) IV Push once  dextrose 50% Injectable 25 Gram(s) IV Push once  glucagon  Injectable 1 milliGRAM(s) IntraMuscular once PRN  insulin glargine Injectable (LANTUS) 8 Unit(s) SubCutaneous at bedtime  insulin lispro (HumaLOG) corrective regimen sliding scale   SubCutaneous three times a day before meals  insulin lispro (HumaLOG) corrective regimen sliding scale   SubCutaneous at bedtime  insulin lispro Injectable (HumaLOG) 3 Unit(s) SubCutaneous three times a day before meals  dextrose 5%. 1000 milliLiter(s) IV Continuous <Continuous>  ergocalciferol 09146 Unit(s) Oral <User Schedule>  sodium chloride 0.9%. 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    FAMILY HISTORY:  No pertinent family history in first degree relatives    SOCIAL HISTORY:    As above    REVIEW OF SYSTEMS:  As per HPI    PHYSICAL EXAM:  T(C): 37.4 (09-11-19 @ 21:29), Max: 37.5 (09-11-19 @ 06:17)  HR: 88 (09-11-19 @ 21:29) (80 - 96)  BP: 124/79 (09-11-19 @ 21:29) (107/58 - 128/68)  RR: 17 (09-11-19 @ 21:29) (17 - 17)  SpO2: 100% (09-11-19 @ 21:29) (100% - 100%)  Wt(kg): --  I&O's Summary    11 Sep 2019 07:01  -  12 Sep 2019 04:41  --------------------------------------------------------  IN: 0 mL / OUT: 400 mL / NET: -400 mL      Appearance: NAD  HEENT:   Normal oral mucosa, PERRL, EOMI	  Cardiovascular: Normal S1 S2, No JVD, No murmurs  Respiratory: Lungs clear to auscultation	  Psychiatry: Awake, alert  Gastrointestinal:  Soft, Non-tender, + BS	  Skin: No rashes, No ecchymoses, No cyanosis	  Neurologic: Non-focal  Extremities:   Vascular:     LABS:	 	                          9.2    6.32  )-----------( 419      ( 11 Sep 2019 05:30 )             29.4     09-11    138  |  102  |  16  ----------------------------<  174<H>  4.0   |  26  |  1.10  09-10    136  |  97<L>  |  19  ----------------------------<  348<H>  4.5   |  28  |  1.12    Ca    8.7      11 Sep 2019 05:30  Ca    8.6      10 Sep 2019 05:42  Phos  3.4     09-11  Phos  3.5     09-10  Mg     1.7     09-11  Mg     1.8     09-10        < from: MR Foot w/wo IV Cont, Left (09.10.19 @ 15:32) >    EXAM:  MR FOOT WAW IC LT        PROCEDURE DATE:  Sep 10 2019         INTERPRETATION:  CLINICAL INDICATION: Wound to hallux and second toe of   the left foot. Evaluate for osteomyelitis.    TECHNIQUE:  Multiplanar and multisequence right forefoot and midfoot MRI performed  before and after administration of 7 cc of Gadavist IV without reported  complications and with 0.5 cc discarded.     COMPARISON: Left foot radiograph from 9/9/2019. MRI of the left foot   8/17/2019.    FINDINGS:    There is soft tissue defect at the distal aspect of the great toe with   underlying cortical erosions of the distal phalanx. There is replacement   of normal T1 marrow signal of the first proximal and distal phalanx with   associated marrow edema consistent with osteomyelitis. There are   additional mild marrow signal abnormality involving the second proximal,   middle, distal phalanx and third distal phalanx which can represent early   osteomyelitis. No rim-enhancing fluid collections to suggest an abscess.   Mild increase in marrow signal is noted at the medial aspect of the first   metatarsal, medial and middle cuneiforms which can be seen in setting of   stress insufficiency fracture. Fluid is again seen under the extensor   tendons and flexor hallucis longus tendon sheath suggestive of   tenosynovitis, likely infectious. Small joint effusion at the first   tarsometatarsal joint. Diffuse muscle edema is again noted which can be   due to denervation or myositis.    Unchanged focus of susceptibility artifact at the plantar aspect of the   first webspace consistent with foreign body.    Status post fourth digit distal metatarsal amputation. No fractures or   dislocations.    IMPRESSION:    Osteomyelitis of the first proximal and distal phalanx.    Less specific focal crescentic subcortical enhancing marrow signal   abnormality in medial plantar aspect of 2nd metatarsal head indeterminate   for reactive osteitis versus early or low-grade osteomyelitis.    Nonspecific patchy enhancing marrow signal abnormality of the medial and   middle cuneiforms, stress reaction due to altered biomechanics or   evolving Charcot changes is a consideration infection thought less likely.    Flexor hallucis longus tenosynovitis.    Unchanged foreign body atthe plantar aspect of the first webspace with   associated localized susceptibility artifact in the region.      OTONIEL PINEDA M.D., RADIOLOGY RESIDENT  This document has been electronically signed.  DANIEL GONZALES M.D., ATTENDING RADIOLOGIST  This document has been electronically signed. Sep 10 2019  5:51PM            < from: [facility] Physiol Extremity Lower 3+ Level, BI (08.19.19 @ 12:04) >    EXAM:  US PHYSIOL LWR EXT 3+ LEV BI                            PROCEDURE DATE:  08/19/2019          INTERPRETATION:  CLINICAL INFORMATION: Left hallux ulcer, osteomyelitis,   history of diabetes.    TECHNIQUE: Multisegmental plethysmography pressure waveform analysis with   ankle-brachial index of the bilateral lower extremities was performed on   8/19/2019 at 1201 hours.    COMPARISON: None.    FINDINGS:  Right:  DAGMAR is 1.53  Segmental pressures measure 184 mmHg in the calf and 181 mmHg in the  ankle.  There are normal waveforms in the lower thigh, calf, and ankle. There are   abnormal waveforms with decreased amplitude in the forefoot and great toe.    Left:  DAGMAR is 1.52  Segmental pressures measure 193 mmHg in the calf and 178 mmHg in the   ankle.  There are multiphasic waveforms in the lower thigh, calf, ankle and   forefoot.    IMPRESSION:  Mildly increased DAGMAR bilaterally suggestive of calcified vessels.  Abnormal waveforms with decreased amplitude in the right forefoot and   great toe suggestive crural disease.      LUIS F KANG M.D., ATTENDING RADIOLOGIST  This document has been electronically signed. Aug 19 2019 12:17PM Cardiology/Vascular Medicine Inpatient Consultation Note      HISTORY OF PRESENT ILLNESS:  Patient is a 39 year old man with T2DM and history of left toe amputation (December 2018) presents with complaint of left foot pain and swelling. One week ago the patient was admitted at [facility] and was treated for fever and right knee inflammation and left foot swelling. He was treated with antibiotics and discharged on Augmentin 875 mg BID for 6 weeks. The patient was instructed to stay off his feet and not wear shoes, but he did not listen to this advice. He wore sneakers and went to work. His foot swelling began to worsen after this with a foul smelling odor. The patient came to the ED. He denies fevers, chills, chest pain, shortness of breath, nausea or vomiting.    Of note, the patient was diagnosed with diabetes around age 13-14. He was taking oral medication and insulin up until last month when he started experiencing urinary frequency He attributed this to one of the medications, but was not sure which it was so stopped both medications. He resumed the insulin only after his recent discharge from [facility]. He uses an insulin pen at 15 units nightly. He lives in a shelter and can afford his medications.     In the ED, his vital signs were stable. He received 1g vancomycin x1 and zosyn x1. (09 Sep 2019 10:07)    From the cardiovascular perspective, he denies having exertional chest pain, dyspnea, palpitations, orthopnea/PND, edema.    No significant findings noted on EKG/telemetry (SR).  Cardiac exam was unremarkable.    Will arrange for echocardiogram to evaluate LV systolic function.  Anticipate that with an unremarkable study, he should be able to proceed with OM resection and toe bone biopsy without the need for additional cardiac testing and risk stratification.    Allergies  No Known Allergies    MEDICATIONS:  cilostazol 50 milliGRAM(s) Oral two times a day  ciprofloxacin   IVPB 400 milliGRAM(s) IV Intermittent every 12 hours  vancomycin  IVPB 1000 milliGRAM(s) IV Intermittent every 12 hours  dextrose 40% Gel 15 Gram(s) Oral once PRN  dextrose 50% Injectable 12.5 Gram(s) IV Push once  dextrose 50% Injectable 25 Gram(s) IV Push once  dextrose 50% Injectable 25 Gram(s) IV Push once  glucagon  Injectable 1 milliGRAM(s) IntraMuscular once PRN  insulin glargine Injectable (LANTUS) 8 Unit(s) SubCutaneous at bedtime  insulin lispro (HumaLOG) corrective regimen sliding scale   SubCutaneous three times a day before meals  insulin lispro (HumaLOG) corrective regimen sliding scale   SubCutaneous at bedtime  insulin lispro Injectable (HumaLOG) 3 Unit(s) SubCutaneous three times a day before meals  dextrose 5%. 1000 milliLiter(s) IV Continuous <Continuous>  ergocalciferol 34242 Unit(s) Oral <User Schedule>  sodium chloride 0.9%. 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    FAMILY HISTORY:  No pertinent family history in first degree relatives    SOCIAL HISTORY:    As above    REVIEW OF SYSTEMS:  As per HPI    PHYSICAL EXAM:  T(C): 37.4 (09-11-19 @ 21:29), Max: 37.5 (09-11-19 @ 06:17)  HR: 88 (09-11-19 @ 21:29) (80 - 96)  BP: 124/79 (09-11-19 @ 21:29) (107/58 - 128/68)  RR: 17 (09-11-19 @ 21:29) (17 - 17)  SpO2: 100% (09-11-19 @ 21:29) (100% - 100%)  Wt(kg): --  I&O's Summary    11 Sep 2019 07:01  -  12 Sep 2019 04:41  --------------------------------------------------------  IN: 0 mL / OUT: 400 mL / NET: -400 mL      Appearance: NAD  HEENT:   Normal oral mucosa, PERRL, EOMI	  Cardiovascular: Normal S1 S2, No JVD, No murmurs  Respiratory: Lungs clear to auscultation	  Psychiatry: Awake, alert  Gastrointestinal:  Soft, Non-tender, + BS	  Skin: No rashes, No ecchymoses, No cyanosis	  Neurologic: Non-focal  Extremities: +Left foot dressing    LABS:	 	                          9.2    6.32  )-----------( 419      ( 11 Sep 2019 05:30 )             29.4     09-11    138  |  102  |  16  ----------------------------<  174<H>  4.0   |  26  |  1.10  09-10    136  |  97<L>  |  19  ----------------------------<  348<H>  4.5   |  28  |  1.12    Ca    8.7      11 Sep 2019 05:30  Ca    8.6      10 Sep 2019 05:42  Phos  3.4     09-11  Phos  3.5     09-10  Mg     1.7     09-11  Mg     1.8     09-10        < from: MR Foot w/wo IV Cont, Left (09.10.19 @ 15:32) >    EXAM:  MR FOOT WAW IC LT        PROCEDURE DATE:  Sep 10 2019         INTERPRETATION:  CLINICAL INDICATION: Wound to hallux and second toe of   the left foot. Evaluate for osteomyelitis.    TECHNIQUE:  Multiplanar and multisequence right forefoot and midfoot MRI performed  before and after administration of 7 cc of Gadavist IV without reported  complications and with 0.5 cc discarded.     COMPARISON: Left foot radiograph from 9/9/2019. MRI of the left foot   8/17/2019.    FINDINGS:    There is soft tissue defect at the distal aspect of the great toe with   underlying cortical erosions of the distal phalanx. There is replacement   of normal T1 marrow signal of the first proximal and distal phalanx with   associated marrow edema consistent with osteomyelitis. There are   additional mild marrow signal abnormality involving the second proximal,   middle, distal phalanx and third distal phalanx which can represent early   osteomyelitis. No rim-enhancing fluid collections to suggest an abscess.   Mild increase in marrow signal is noted at the medial aspect of the first   metatarsal, medial and middle cuneiforms which can be seen in setting of   stress insufficiency fracture. Fluid is again seen under the extensor   tendons and flexor hallucis longus tendon sheath suggestive of   tenosynovitis, likely infectious. Small joint effusion at the first   tarsometatarsal joint. Diffuse muscle edema is again noted which can be   due to denervation or myositis.    Unchanged focus of susceptibility artifact at the plantar aspect of the   first webspace consistent with foreign body.    Status post fourth digit distal metatarsal amputation. No fractures or   dislocations.    IMPRESSION:    Osteomyelitis of the first proximal and distal phalanx.    Less specific focal crescentic subcortical enhancing marrow signal   abnormality in medial plantar aspect of 2nd metatarsal head indeterminate   for reactive osteitis versus early or low-grade osteomyelitis.    Nonspecific patchy enhancing marrow signal abnormality of the medial and   middle cuneiforms, stress reaction due to altered biomechanics or   evolving Charcot changes is a consideration infection thought less likely.    Flexor hallucis longus tenosynovitis.    Unchanged foreign body atthe plantar aspect of the first webspace with   associated localized susceptibility artifact in the region.      OTONIEL PINEDA M.D., RADIOLOGY RESIDENT  This document has been electronically signed.  DANIEL GONZALES M.D., ATTENDING RADIOLOGIST  This document has been electronically signed. Sep 10 2019  5:51PM            < from: [facility] Physiol Extremity Lower 3+ Level, BI (08.19.19 @ 12:04) >    EXAM:  US PHYSIOL LWR EXT 3+ LEV BI                            PROCEDURE DATE:  08/19/2019          INTERPRETATION:  CLINICAL INFORMATION: Left hallux ulcer, osteomyelitis,   history of diabetes.    TECHNIQUE: Multisegmental plethysmography pressure waveform analysis with   ankle-brachial index of the bilateral lower extremities was performed on   8/19/2019 at 1201 hours.    COMPARISON: None.    FINDINGS:  Right:  DAGMAR is 1.53  Segmental pressures measure 184 mmHg in the calf and 181 mmHg in the  ankle.  There are normal waveforms in the lower thigh, calf, and ankle. There are   abnormal waveforms with decreased amplitude in the forefoot and great toe.    Left:  DAGMAR is 1.52  Segmental pressures measure 193 mmHg in the calf and 178 mmHg in the   ankle.  There are multiphasic waveforms in the lower thigh, calf, ankle and   forefoot.    IMPRESSION:  Mildly increased DAGMAR bilaterally suggestive of calcified vessels.  Abnormal waveforms with decreased amplitude in the right forefoot and   great toe suggestive crural disease.      LUIS F KANG M.D., ATTENDING RADIOLOGIST  This document has been electronically signed. Aug 19 2019 12:17PM Cardiology/Vascular Medicine Inpatient Consultation Note    HISTORY OF PRESENT ILLNESS:    Patient is a 39 year old man with T2DM and history of left toe amputation (December 2018) presents with complaint of left foot pain and swelling. Noted to have osteomyelitis in his left first toe and possibly his second toe.    From the cardiovascular perspective, he denies having exertional chest pain, dyspnea, palpitations, orthopnea/PND, edema.    No significant findings noted on EKG/telemetry (SR).  Cardiac exam was unremarkable.    Echocardiogram demonstrated normal biventricular systolic function. The patient can proceed with OM resection and toe bone biopsy without the need for additional cardiac testing and risk stratification.      Allergies  No Known Allergies    MEDICATIONS:  cilostazol 50 milliGRAM(s) Oral two times a day  ciprofloxacin   IVPB 400 milliGRAM(s) IV Intermittent every 12 hours  vancomycin  IVPB 1000 milliGRAM(s) IV Intermittent every 12 hours  dextrose 40% Gel 15 Gram(s) Oral once PRN  dextrose 50% Injectable 12.5 Gram(s) IV Push once  dextrose 50% Injectable 25 Gram(s) IV Push once  dextrose 50% Injectable 25 Gram(s) IV Push once  glucagon  Injectable 1 milliGRAM(s) IntraMuscular once PRN  insulin glargine Injectable (LANTUS) 8 Unit(s) SubCutaneous at bedtime  insulin lispro (HumaLOG) corrective regimen sliding scale   SubCutaneous three times a day before meals  insulin lispro (HumaLOG) corrective regimen sliding scale   SubCutaneous at bedtime  insulin lispro Injectable (HumaLOG) 3 Unit(s) SubCutaneous three times a day before meals  dextrose 5%. 1000 milliLiter(s) IV Continuous <Continuous>  ergocalciferol 34160 Unit(s) Oral <User Schedule>  sodium chloride 0.9%. 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    FAMILY HISTORY:  No pertinent family history in first degree relatives    SOCIAL HISTORY:    As above    REVIEW OF SYSTEMS:  As per HPI    PHYSICAL EXAM:  T(C): 37.4 (09-11-19 @ 21:29), Max: 37.5 (09-11-19 @ 06:17)  HR: 88 (09-11-19 @ 21:29) (80 - 96)  BP: 124/79 (09-11-19 @ 21:29) (107/58 - 128/68)  RR: 17 (09-11-19 @ 21:29) (17 - 17)  SpO2: 100% (09-11-19 @ 21:29) (100% - 100%)  Wt(kg): --  I&O's Summary    11 Sep 2019 07:01  -  12 Sep 2019 04:41  --------------------------------------------------------  IN: 0 mL / OUT: 400 mL / NET: -400 mL      Appearance: NAD  HEENT:   Normal oral mucosa, PERRL, EOMI	  Cardiovascular: Normal S1 S2, No JVD, No murmurs  Respiratory: Lungs clear to auscultation	  Psychiatry: Awake, alert  Gastrointestinal:  Soft, Non-tender, + BS	  Skin: No rashes, No ecchymoses, No cyanosis	  Neurologic: Non-focal  Extremities: +Left foot dressing    LABS:	 	                          9.2    6.32  )-----------( 419      ( 11 Sep 2019 05:30 )             29.4     09-11    138  |  102  |  16  ----------------------------<  174<H>  4.0   |  26  |  1.10  09-10    136  |  97<L>  |  19  ----------------------------<  348<H>  4.5   |  28  |  1.12    Ca    8.7      11 Sep 2019 05:30  Ca    8.6      10 Sep 2019 05:42  Phos  3.4     09-11  Phos  3.5     09-10  Mg     1.7     09-11  Mg     1.8     09-10        < from: MR Foot w/wo IV Cont, Left (09.10.19 @ 15:32) >    EXAM:  MR FOOT WAW IC LT        PROCEDURE DATE:  Sep 10 2019         INTERPRETATION:  CLINICAL INDICATION: Wound to hallux and second toe of   the left foot. Evaluate for osteomyelitis.    TECHNIQUE:  Multiplanar and multisequence right forefoot and midfoot MRI performed  before and after administration of 7 cc of Gadavist IV without reported  complications and with 0.5 cc discarded.     COMPARISON: Left foot radiograph from 9/9/2019. MRI of the left foot   8/17/2019.    FINDINGS:    There is soft tissue defect at the distal aspect of the great toe with   underlying cortical erosions of the distal phalanx. There is replacement   of normal T1 marrow signal of the first proximal and distal phalanx with   associated marrow edema consistent with osteomyelitis. There are   additional mild marrow signal abnormality involving the second proximal,   middle, distal phalanx and third distal phalanx which can represent early   osteomyelitis. No rim-enhancing fluid collections to suggest an abscess.   Mild increase in marrow signal is noted at the medial aspect of the first   metatarsal, medial and middle cuneiforms which can be seen in setting of   stress insufficiency fracture. Fluid is again seen under the extensor   tendons and flexor hallucis longus tendon sheath suggestive of   tenosynovitis, likely infectious. Small joint effusion at the first   tarsometatarsal joint. Diffuse muscle edema is again noted which can be   due to denervation or myositis.    Unchanged focus of susceptibility artifact at the plantar aspect of the   first webspace consistent with foreign body.    Status post fourth digit distal metatarsal amputation. No fractures or   dislocations.    IMPRESSION:    Osteomyelitis of the first proximal and distal phalanx.    Less specific focal crescentic subcortical enhancing marrow signal   abnormality in medial plantar aspect of 2nd metatarsal head indeterminate   for reactive osteitis versus early or low-grade osteomyelitis.    Nonspecific patchy enhancing marrow signal abnormality of the medial and   middle cuneiforms, stress reaction due to altered biomechanics or   evolving Charcot changes is a consideration infection thought less likely.    Flexor hallucis longus tenosynovitis.    Unchanged foreign body atthe plantar aspect of the first webspace with   associated localized susceptibility artifact in the region.      OTONIEL PINEDA M.D., RADIOLOGY RESIDENT  This document has been electronically signed.  DANIEL GONZALES M.D., ATTENDING RADIOLOGIST  This document has been electronically signed. Sep 10 2019  5:51PM            < from: [facility] Physiol Extremity Lower 3+ Level, BI (08.19.19 @ 12:04) >    EXAM:  US PHYSIOL LWR EXT 3+ LEV BI                            PROCEDURE DATE:  08/19/2019          INTERPRETATION:  CLINICAL INFORMATION: Left hallux ulcer, osteomyelitis,   history of diabetes.    TECHNIQUE: Multisegmental plethysmography pressure waveform analysis with   ankle-brachial index of the bilateral lower extremities was performed on   8/19/2019 at 1201 hours.    COMPARISON: None.    FINDINGS:  Right:  DAGMAR is 1.53  Segmental pressures measure 184 mmHg in the calf and 181 mmHg in the  ankle.  There are normal waveforms in the lower thigh, calf, and ankle. There are   abnormal waveforms with decreased amplitude in the forefoot and great toe.    Left:  DAGMAR is 1.52  Segmental pressures measure 193 mmHg in the calf and 178 mmHg in the   ankle.  There are multiphasic waveforms in the lower thigh, calf, ankle and   forefoot.    IMPRESSION:  Mildly increased DAGMAR bilaterally suggestive of calcified vessels.  Abnormal waveforms with decreased amplitude in the right forefoot and   great toe suggestive crural disease.      NO JORGE LUIS MILLS, ATTENDING RADIOLOGIST  This document has been electronically signed. Aug 19 2019 12:17PM        < from: Transthoracic Echocardiogram (09.12.19 @ 10:57) >    Patient name: COLE FERNANDEZ  YOB: 1980   Age: 39 (M)   MR#: [id number]  Study Date: 9/12/2019  [facility]  Study quality: Technically good  Referring Physician: Hira Aguirre MD  Blood Pressure: 114/68 mmHg  Height: 167 cm  Weight: 63 kg  BSA: 1.7 m2  ------------------------------------------------------------------------  PROCEDURE: Transthoracic echocardiogram with 2-D, M-Mode  and complete spectral and color flow Doppler.  INDICATION: Cardiac murmur, unspecified (R01.1)  ------------------------------------------------------------------------  DIMENSIONS:  Dimensions:     Normal Values:  LA:     3.1 cm    2.0 - 4.0 cm  Ao:     2.8 cm    2.0 - 3.8 cm  SEPTUM: 1.0 cm    0.6 - 1.2cm  PWT:    0.9 cm    0.6 - 1.1 cm  LVIDd:  4.4 cm    3.0 - 5.6 cm  LVIDs:  3.1 cm    1.8 - 4.0 cm  Derived Variables:  LVMI: 81 g/m2  RWT: 0.40  Fractional short: 30 %  Ejection Fraction (Teicholtz): 57 %  ------------------------------------------------------------------------  OBSERVATIONS:  Mitral Valve: Normal mitral valve.  Aortic Root: Normal aortic root.  Aortic Valve: Normal trileaflet aortic valve.  Left Atrium: LA volume index = 13 cc/m2.  Left Ventricle: Normal left ventricular systolic function.  No segmental wall motion abnormalities. Normal left  ventricular internal dimensions and wall thicknesses.  (DT:159 ms).  Right Heart: Normal right atrium. Normal right ventricular  size and function. Normal tricuspid valve. Mild tricuspid  regurgitation. Normal pulmonic valve.  Pericardium/PleuraNormal pericardium with no pericardial  effusion.  ------------------------------------------------------------------------  CONCLUSIONS:  1. Normal trileaflet aortic valve.  2. Normal left ventricular internal dimensions and wall  thicknesses.  3. Normal left ventricular systolic function. No segmental  wall motion abnormalities.  4. Normal right ventricular size and function.  ------------------------------------------------------------------------  Confirmed on  9/12/2019 - 15:21:27 by GENE Woods  ------------------------------------------------------------------------    < end of copied text >

## 2019-09-12 NOTE — CONSULT NOTE ADULT - ASSESSMENT
Patient is a 39 year old man with T2DM and history of left toe amputation (December 2018) presents with complaint of left foot pain and swelling. Noted to have osteomyelitis in his left first toe and possibly his second toe.    From the cardiovascular perspective, he denies having exertional chest pain, dyspnea, palpitations, orthopnea/PND, edema.    No significant findings noted on EKG/telemetry (SR).  Cardiac exam was unremarkable.    Will arrange for echocardiogram to evaluate LV systolic function.  Anticipate that with an unremarkable study, he should be able to proceed with OM resection and toe bone biopsy without the need for additional cardiac testing and risk stratification. Patient is a 39 year old man with T2DM and history of left toe amputation (December 2018) presents with complaint of left foot pain and swelling. Noted to have osteomyelitis in his left first toe and possibly his second toe.    From the cardiovascular perspective, he denies having exertional chest pain, dyspnea, palpitations, orthopnea/PND, edema.    No significant findings noted on EKG/telemetry (SR).  Cardiac exam was unremarkable.    Echocardiogram demonstrated normal biventricular systolic function. The patient can proceed with OM resection and toe bone biopsy without the need for additional cardiac testing and risk stratification.

## 2019-09-12 NOTE — BRIEF OPERATIVE NOTE - SPECIMENS
1. left foot 2nd metatarsal sent to microbiology 2. left foot 1st metatarsal clean margin sent to microbiology 3. left foot 1st metatarsal clean margin sent to pathology 3. left foot 2nd metatarsal [facility] 4. left great toe sent to pathology 5. left foot clean margin of first metatarsal sent to pathology

## 2019-09-12 NOTE — CONSULT NOTE ADULT - REASON FOR ADMISSION
IV ABX and Pods intervention
Left foot pain and swelling
Left foot pain and swelling
left foot osteomyelitis
Left foot pain and swelling

## 2019-09-12 NOTE — PROGRESS NOTE ADULT - ASSESSMENT
Assessment/Plan: 39M with likely acute worsening of chronic LLE hallux wound. Palpable pulses with likely small vessel disease 2/2 DM.    - Cont IV ABX   - Continue local wound care  - Follow podiatry recommendations  -recommend pletal 50 bid   will follow

## 2019-09-12 NOTE — PROGRESS NOTE ADULT - ASSESSMENT
39 year old with dm and foot ulcer, prior diagnosis of Om of foot, presents with fever and foot pain with non-healing wound      1) Foot ulcer  prior cultures with citrobacter and acinitobacter  Cx now with proteus and pseudomonas    Continue Cipro    Can continue vancomycin     2) Osteomyelitis of foot  Plan is for OR debridement later this week      3) DM: glycemic control key to wound healing      4) Therapeutic drug monitoring  vanco through okay

## 2019-09-12 NOTE — PROGRESS NOTE ADULT - SUBJECTIVE AND OBJECTIVE BOX
Patient is a 39y old  Male who presents with a chief complaint of Left foot pain and swelling (12 Sep 2019 12:23)      Vascular Surgery Attending Progress Note    Interval HPI: pt w/o c/o     Medications:  acetaminophen   Tablet .. 650 milliGRAM(s) Oral every 6 hours PRN  cilostazol 50 milliGRAM(s) Oral two times a day  ciprofloxacin   IVPB 400 milliGRAM(s) IV Intermittent every 12 hours  dextrose 40% Gel 15 Gram(s) Oral once PRN  dextrose 5%. 1000 milliLiter(s) IV Continuous <Continuous>  dextrose 50% Injectable 12.5 Gram(s) IV Push once  dextrose 50% Injectable 25 Gram(s) IV Push once  dextrose 50% Injectable 25 Gram(s) IV Push once  ergocalciferol 92913 Unit(s) Oral <User Schedule>  glucagon  Injectable 1 milliGRAM(s) IntraMuscular once PRN  influenza   Vaccine 0.5 milliLiter(s) IntraMuscular once  insulin glargine Injectable (LANTUS) 8 Unit(s) SubCutaneous at bedtime  insulin lispro (HumaLOG) corrective regimen sliding scale   SubCutaneous three times a day before meals  insulin lispro (HumaLOG) corrective regimen sliding scale   SubCutaneous at bedtime  insulin lispro Injectable (HumaLOG) 3 Unit(s) SubCutaneous three times a day before meals  oxyCODONE    5 mG/acetaminophen 325 mG 1 Tablet(s) Oral every 4 hours PRN  sodium chloride 0.9%. 1000 milliLiter(s) IV Continuous <Continuous>  vancomycin  IVPB 1000 milliGRAM(s) IV Intermittent every 12 hours      Vital Signs Last 24 Hrs  T(C): 36.8 (12 Sep 2019 13:55), Max: 37.6 (12 Sep 2019 05:07)  T(F): 98.2 (12 Sep 2019 13:55), Max: 99.6 (12 Sep 2019 05:07)  HR: 94 (12 Sep 2019 13:55) (91 - 94)  BP: 115/62 (12 Sep 2019 13:55) (114/68 - 131/75)  BP(mean): --  RR: 16 (12 Sep 2019 13:55) (16 - 18)  SpO2: 99% (12 Sep 2019 13:55) (99% - 100%)  I&O's Summary    11 Sep 2019 07:01  -  12 Sep 2019 07:00  --------------------------------------------------------  IN: 810 mL / OUT: 1200 mL / NET: -390 mL        Physical Exam:  Neuro  A&Ox3 VSS  Vascular: stable dressing c/d/i     LABS:                        9.6    5.98  )-----------( 458      ( 12 Sep 2019 06:05 )             31.1     09-12    140  |  101  |  11  ----------------------------<  169<H>  4.1   |  26  |  1.15    Ca    8.8      12 Sep 2019 06:05  Phos  3.4     09-11  Mg     1.7     09-11      PT/INR - ( 12 Sep 2019 06:05 )   PT: 13.7 SEC;   INR: 1.23          PTT - ( 12 Sep 2019 06:05 )  PTT:34.4 SEC    ABE COBURN MD  [phone]

## 2019-09-12 NOTE — PROGRESS NOTE ADULT - SUBJECTIVE AND OBJECTIVE BOX
Patient is a 39y old  Male who presents with a chief complaint of Left foot pain and swelling (12 Sep 2019 04:41)      INTERVAL HPI/OVERNIGHT EVENTS:   Pt is scheduled for Left foot partial 1st ray resection with 2nd metatarsal head bone biopsy with Dr. Izquierdo at 1pm. Patient is aware of procedure and is NPO since midnight.    MEDICATIONS  (STANDING):  cilostazol 50 milliGRAM(s) Oral two times a day  ciprofloxacin   IVPB 400 milliGRAM(s) IV Intermittent every 12 hours  dextrose 5%. 1000 milliLiter(s) (50 mL/Hr) IV Continuous <Continuous>  dextrose 50% Injectable 12.5 Gram(s) IV Push once  dextrose 50% Injectable 25 Gram(s) IV Push once  dextrose 50% Injectable 25 Gram(s) IV Push once  ergocalciferol 87394 Unit(s) Oral <User Schedule>  influenza   Vaccine 0.5 milliLiter(s) IntraMuscular once  insulin glargine Injectable (LANTUS) 8 Unit(s) SubCutaneous at bedtime  insulin lispro (HumaLOG) corrective regimen sliding scale   SubCutaneous three times a day before meals  insulin lispro (HumaLOG) corrective regimen sliding scale   SubCutaneous at bedtime  insulin lispro Injectable (HumaLOG) 3 Unit(s) SubCutaneous three times a day before meals  sodium chloride 0.9%. 1000 milliLiter(s) (30 mL/Hr) IV Continuous <Continuous>  vancomycin  IVPB 1000 milliGRAM(s) IV Intermittent every 12 hours    MEDICATIONS  (PRN):  dextrose 40% Gel 15 Gram(s) Oral once PRN Blood Glucose LESS THAN 70 milliGRAM(s)/deciliter  glucagon  Injectable 1 milliGRAM(s) IntraMuscular once PRN Glucose LESS THAN 70 milligrams/deciliter      Allergies    No Known Allergies    Intolerances        Vital Signs Last 24 Hrs  T(C): 37.6 (12 Sep 2019 05:07), Max: 37.6 (12 Sep 2019 05:07)  T(F): 99.6 (12 Sep 2019 05:07), Max: 99.6 (12 Sep 2019 05:07)  HR: 94 (12 Sep 2019 05:07) (88 - 96)  BP: 114/68 (12 Sep 2019 05:07) (114/68 - 128/68)  BP(mean): --  RR: 18 (12 Sep 2019 05:07) (17 - 18)  SpO2: 100% (12 Sep 2019 05:07) (100% - 100%)    LABS:                        9.6    5.98  )-----------( 458      ( 12 Sep 2019 06:05 )             31.1     09-12    140  |  101  |  11  ----------------------------<  169<H>  4.1   |  26  |  1.15    Ca    8.8      12 Sep 2019 06:05  Phos  3.4     09-11  Mg     1.7     09-11      PT/INR - ( 12 Sep 2019 06:05 )   PT: 13.7 SEC;   INR: 1.23          PTT - ( 12 Sep 2019 06:05 )  PTT:34.4 SEC    CAPILLARY BLOOD GLUCOSE      POCT Blood Glucose.: 160 mg/dL (12 Sep 2019 06:09)  POCT Blood Glucose.: 277 mg/dL (11 Sep 2019 21:11)  POCT Blood Glucose.: 135 mg/dL (11 Sep 2019 16:53)  POCT Blood Glucose.: 169 mg/dL (11 Sep 2019 11:32)      RADIOLOGY & ADDITIONAL TESTS:    Plan:   Pt is scheduled for Left foot partial 1st ray resection with 2nd metatarsal head bone biopsy with Dr. Izquierdo at 1pm. Patient is aware of procedure and is NPO since midnight.  CXR on sunrise.  EKG on sunrise.  Medical/Cardiac clearance since 9/11/19 and documented in chart.  Consent signed and in chart.  Procedure was explained to patient in detail. All alternatives, risks and complications were discussed. All questions answered.

## 2019-09-12 NOTE — BRIEF OPERATIVE NOTE - NSICDXBRIEFPROCEDURE_GEN_ALL_CORE_FT
PROCEDURES:  Partial amputation of first ray of left foot by open approach 12-Sep-2019 16:59:39  Elda Kenyon

## 2019-09-12 NOTE — PROGRESS NOTE ADULT - SUBJECTIVE AND OBJECTIVE BOX
Follow Up:      Inverval History/ROS:Patient is a 39y old  Male who presents with a chief complaint of Left foot pain and swelling (12 Sep 2019 10:27)    No fever.  No diarrhea.      Allergies    No Known Allergies    Intolerances        ANTIMICROBIALS:  ciprofloxacin   IVPB 400 every 12 hours  vancomycin  IVPB 1000 every 12 hours      OTHER MEDS:  cilostazol 50 milliGRAM(s) Oral two times a day  dextrose 40% Gel 15 Gram(s) Oral once PRN  dextrose 5%. 1000 milliLiter(s) IV Continuous <Continuous>  dextrose 50% Injectable 12.5 Gram(s) IV Push once  dextrose 50% Injectable 25 Gram(s) IV Push once  dextrose 50% Injectable 25 Gram(s) IV Push once  ergocalciferol 73146 Unit(s) Oral <User Schedule>  glucagon  Injectable 1 milliGRAM(s) IntraMuscular once PRN  influenza   Vaccine 0.5 milliLiter(s) IntraMuscular once  insulin glargine Injectable (LANTUS) 8 Unit(s) SubCutaneous at bedtime  insulin lispro (HumaLOG) corrective regimen sliding scale   SubCutaneous three times a day before meals  insulin lispro (HumaLOG) corrective regimen sliding scale   SubCutaneous at bedtime  insulin lispro Injectable (HumaLOG) 3 Unit(s) SubCutaneous three times a day before meals  sodium chloride 0.9%. 1000 milliLiter(s) IV Continuous <Continuous>      Vital Signs Last 24 Hrs  T(C): 37.6 (12 Sep 2019 05:07), Max: 37.6 (12 Sep 2019 05:07)  T(F): 99.6 (12 Sep 2019 05:07), Max: 99.6 (12 Sep 2019 05:07)  HR: 94 (12 Sep 2019 05:07) (88 - 96)  BP: 114/68 (12 Sep 2019 05:07) (114/68 - 128/68)  BP(mean): --  RR: 18 (12 Sep 2019 05:07) (17 - 18)  SpO2: 100% (12 Sep 2019 05:07) (100% - 100%)    PHYSICAL EXAM:  General: [x] non-toxic  HEAD/EYES: [ ] PERRL [x ] white sclera [ ] icterus  ENT:  [ ] normal [ x] supple [ ] thrush [ ] pharyngeal exudate  Cardiovascular:   [ ] murmur [x ] normal [ ] PPM/AICD  Respiratory:  [ x] clear to ausculation bilaterally  GI:  [ x] soft, non-tender, normal bowel sounds  :  [ ] willard x[ ] no CVA tenderness   Musculoskeletal:  [ ] no synovitis  Neurologic:  [ ] non-focal exam   Skin:  x] no rash  Lymph: [ ] no lymphadenopathy  Psychiatric:  [ ] appropriate affect [x ] alert & oriented  Lines:  [x ] no phlebitis [ ] central line                                9.6    5.98  )-----------( 458      ( 12 Sep 2019 06:05 )             31.1       09-12    140  |  101  |  11  ----------------------------<  169<H>  4.1   |  26  |  1.15    Ca    8.8      12 Sep 2019 06:05  Phos  3.4     09-11  Mg     1.7     09-11            MICROBIOLOGY:    RADIOLOGY:

## 2019-09-12 NOTE — PROGRESS NOTE ADULT - SUBJECTIVE AND OBJECTIVE BOX
late note entry  SUBJECTIVE / OVERNIGHT EVENTS: pt denies chest pain, shortness of breath     MEDICATIONS  (STANDING):  cilostazol 50 milliGRAM(s) Oral two times a day  ciprofloxacin   IVPB 400 milliGRAM(s) IV Intermittent every 12 hours  dextrose 5%. 1000 milliLiter(s) (50 mL/Hr) IV Continuous <Continuous>  dextrose 50% Injectable 12.5 Gram(s) IV Push once  dextrose 50% Injectable 25 Gram(s) IV Push once  dextrose 50% Injectable 25 Gram(s) IV Push once  ergocalciferol 86594 Unit(s) Oral <User Schedule>  insulin glargine Injectable (LANTUS) 8 Unit(s) SubCutaneous at bedtime  insulin lispro (HumaLOG) corrective regimen sliding scale   SubCutaneous three times a day before meals  insulin lispro (HumaLOG) corrective regimen sliding scale   SubCutaneous at bedtime  insulin lispro Injectable (HumaLOG) 3 Unit(s) SubCutaneous three times a day before meals  sodium chloride 0.9%. 1000 milliLiter(s) (30 mL/Hr) IV Continuous <Continuous>  vancomycin  IVPB 1000 milliGRAM(s) IV Intermittent every 12 hours    MEDICATIONS  (PRN):  dextrose 40% Gel 15 Gram(s) Oral once PRN Blood Glucose LESS THAN 70 milliGRAM(s)/deciliter  glucagon  Injectable 1 milliGRAM(s) IntraMuscular once PRN Glucose LESS THAN 70 milligrams/deciliter    Vital Signs Last 24 Hrs  T(C): 37.4 (11 Sep 2019 21:29), Max: 37.5 (11 Sep 2019 06:17)  T(F): 99.3 (11 Sep 2019 21:29), Max: 99.5 (11 Sep 2019 06:17)  HR: 88 (11 Sep 2019 21:29) (80 - 96)  BP: 124/79 (11 Sep 2019 21:29) (107/58 - 128/68)  BP(mean): --  RR: 17 (11 Sep 2019 21:29) (17 - 17)  SpO2: 100% (11 Sep 2019 21:29) (100% - 100%)  Constitutional: No fever, fatigue  Skin: No rash.  Eyes: No recent vision problems or eye pain.  ENT: No congestion, ear pain, or sore throat.  Cardiovascular: No chest pain or palpation.  Respiratory: No cough, shortness of breath, congestion, or wheezing.  Gastrointestinal: No abdominal pain, nausea, vomiting, or diarrhea.  Genitourinary: No dysuria.  Musculoskeletal: No joint swelling.  Neurologic: No headache.    PHYSICAL EXAM:  GENERAL: NAD  EYES: EOMI, PERRLA  NECK: Supple, No JVD  CHEST/LUNG: cta charan   HEART:  S1 , S2 +  ABDOMEN: soft , bs+  EXTREMITIES: left  foot dressing+  NEUROLOGY:alert awake oriented       LABS:  09-11    138  |  102  |  16  ----------------------------<  174<H>  4.0   |  26  |  1.10    Ca    8.7      11 Sep 2019 05:30  Phos  3.4     09-11  Mg     1.7     09-11      Creatinine Trend: 1.10 <--, 1.12 <--, 1.25 <--                        9.2    6.32  )-----------( 419      ( 11 Sep 2019 05:30 )             29.4     Urine Studies:                  Imaging Personally Reviewed:    Consultant(s) Notes Reviewed:      Care Discussed with Consultants/Other Providers:

## 2019-09-13 ENCOUNTER — TRANSCRIPTION ENCOUNTER (OUTPATIENT)
Age: 39
End: 2019-09-13

## 2019-09-13 DIAGNOSIS — Z71.89 OTHER SPECIFIED COUNSELING: ICD-10-CM

## 2019-09-13 PROBLEM — E11.9 TYPE 2 DIABETES MELLITUS WITHOUT COMPLICATIONS: Chronic | Status: ACTIVE | Noted: 2019-09-09

## 2019-09-13 LAB
-  AMIKACIN: SIGNIFICANT CHANGE UP
-  AMPICILLIN/SULBACTAM: SIGNIFICANT CHANGE UP
-  AMPICILLIN/SULBACTAM: SIGNIFICANT CHANGE UP
-  AMPICILLIN: SIGNIFICANT CHANGE UP
-  AMPICILLIN: SIGNIFICANT CHANGE UP
-  AZTREONAM: SIGNIFICANT CHANGE UP
-  CEFAZOLIN: SIGNIFICANT CHANGE UP
-  CEFEPIME: SIGNIFICANT CHANGE UP
-  CEFOXITIN: SIGNIFICANT CHANGE UP
-  CEFOXITIN: SIGNIFICANT CHANGE UP
-  CEFTAZIDIME: SIGNIFICANT CHANGE UP
-  CEFTRIAXONE: SIGNIFICANT CHANGE UP
-  CEFTRIAXONE: SIGNIFICANT CHANGE UP
-  ERTAPENEM: SIGNIFICANT CHANGE UP
-  ERTAPENEM: SIGNIFICANT CHANGE UP
-  GENTAMICIN: SIGNIFICANT CHANGE UP
-  IMIPENEM: SIGNIFICANT CHANGE UP
-  IMIPENEM: SIGNIFICANT CHANGE UP
-  LEVOFLOXACIN: SIGNIFICANT CHANGE UP
-  MEROPENEM: SIGNIFICANT CHANGE UP
-  NITROFURANTOIN: SIGNIFICANT CHANGE UP
-  NITROFURANTOIN: SIGNIFICANT CHANGE UP
-  PIPERACILLIN/TAZOBACTAM: SIGNIFICANT CHANGE UP
-  TIGECYCLINE: SIGNIFICANT CHANGE UP
-  TOBRAMYCIN: SIGNIFICANT CHANGE UP
-  TRIMETHOPRIM/SULFAMETHOXAZOLE: SIGNIFICANT CHANGE UP
-  TRIMETHOPRIM/SULFAMETHOXAZOLE: SIGNIFICANT CHANGE UP
ANION GAP SERPL CALC-SCNC: 13 MMO/L — SIGNIFICANT CHANGE UP (ref 7–14)
BACTERIA WND CULT: SIGNIFICANT CHANGE UP
BLD GP AB SCN SERPL QL: NEGATIVE — SIGNIFICANT CHANGE UP
BUN SERPL-MCNC: 13 MG/DL — SIGNIFICANT CHANGE UP (ref 7–23)
CALCIUM SERPL-MCNC: 8.3 MG/DL — LOW (ref 8.4–10.5)
CHLORIDE SERPL-SCNC: 98 MMOL/L — SIGNIFICANT CHANGE UP (ref 98–107)
CO2 SERPL-SCNC: 26 MMOL/L — SIGNIFICANT CHANGE UP (ref 22–31)
CREAT SERPL-MCNC: 1.32 MG/DL — HIGH (ref 0.5–1.3)
GLUCOSE BLDC GLUCOMTR-MCNC: 142 MG/DL — HIGH (ref 70–99)
GLUCOSE BLDC GLUCOMTR-MCNC: 188 MG/DL — HIGH (ref 70–99)
GLUCOSE BLDC GLUCOMTR-MCNC: 197 MG/DL — HIGH (ref 70–99)
GLUCOSE BLDC GLUCOMTR-MCNC: 282 MG/DL — HIGH (ref 70–99)
GLUCOSE SERPL-MCNC: 182 MG/DL — HIGH (ref 70–99)
HCT VFR BLD CALC: 27.8 % — LOW (ref 39–50)
HGB BLD-MCNC: 8.9 G/DL — LOW (ref 13–17)
MAGNESIUM SERPL-MCNC: 1.6 MG/DL — SIGNIFICANT CHANGE UP (ref 1.6–2.6)
MCHC RBC-ENTMCNC: 27.4 PG — SIGNIFICANT CHANGE UP (ref 27–34)
MCHC RBC-ENTMCNC: 32 % — SIGNIFICANT CHANGE UP (ref 32–36)
MCV RBC AUTO: 85.5 FL — SIGNIFICANT CHANGE UP (ref 80–100)
METHOD TYPE: SIGNIFICANT CHANGE UP
NRBC # FLD: 0 K/UL — SIGNIFICANT CHANGE UP (ref 0–0)
ORGANISM # SPEC MICROSCOPIC CNT: SIGNIFICANT CHANGE UP
ORGANISM # SPEC MICROSCOPIC CNT: SIGNIFICANT CHANGE UP
PHOSPHATE SERPL-MCNC: 3.1 MG/DL — SIGNIFICANT CHANGE UP (ref 2.5–4.5)
PLATELET # BLD AUTO: 391 K/UL — SIGNIFICANT CHANGE UP (ref 150–400)
PMV BLD: 9.3 FL — SIGNIFICANT CHANGE UP (ref 7–13)
POTASSIUM SERPL-MCNC: 4.2 MMOL/L — SIGNIFICANT CHANGE UP (ref 3.5–5.3)
POTASSIUM SERPL-SCNC: 4.2 MMOL/L — SIGNIFICANT CHANGE UP (ref 3.5–5.3)
RBC # BLD: 3.25 M/UL — LOW (ref 4.2–5.8)
RBC # FLD: 12.7 % — SIGNIFICANT CHANGE UP (ref 10.3–14.5)
RH IG SCN BLD-IMP: POSITIVE — SIGNIFICANT CHANGE UP
SODIUM SERPL-SCNC: 137 MMOL/L — SIGNIFICANT CHANGE UP (ref 135–145)
VANCOMYCIN TROUGH SERPL-MCNC: 13.3 UG/ML — SIGNIFICANT CHANGE UP (ref 10–20)
WBC # BLD: 7.63 K/UL — SIGNIFICANT CHANGE UP (ref 3.8–10.5)
WBC # FLD AUTO: 7.63 K/UL — SIGNIFICANT CHANGE UP (ref 3.8–10.5)

## 2019-09-13 PROCEDURE — 99232 SBSQ HOSP IP/OBS MODERATE 35: CPT

## 2019-09-13 RX ADMIN — Medication 1: at 22:30

## 2019-09-13 RX ADMIN — Medication 200 MILLIGRAM(S): at 05:59

## 2019-09-13 RX ADMIN — INSULIN GLARGINE 8 UNIT(S): 100 INJECTION, SOLUTION SUBCUTANEOUS at 22:30

## 2019-09-13 RX ADMIN — Medication 1: at 17:26

## 2019-09-13 RX ADMIN — Medication 200 MILLIGRAM(S): at 21:27

## 2019-09-13 RX ADMIN — Medication 0: at 07:48

## 2019-09-13 RX ADMIN — Medication 3 UNIT(S): at 11:59

## 2019-09-13 RX ADMIN — Medication 3 UNIT(S): at 07:49

## 2019-09-13 RX ADMIN — Medication 3 UNIT(S): at 17:26

## 2019-09-13 RX ADMIN — Medication 650 MILLIGRAM(S): at 23:02

## 2019-09-13 RX ADMIN — Medication 650 MILLIGRAM(S): at 21:26

## 2019-09-13 RX ADMIN — CILOSTAZOL 50 MILLIGRAM(S): 100 TABLET ORAL at 17:26

## 2019-09-13 RX ADMIN — Medication 250 MILLIGRAM(S): at 12:45

## 2019-09-13 RX ADMIN — Medication 1: at 11:59

## 2019-09-13 NOTE — PHYSICAL THERAPY INITIAL EVALUATION ADULT - GENERAL OBSERVATIONS, REHAB EVAL
Consult received, chart reviewed. Patient received supine in bed, no apparent distress, ace wrap left foot c/d/i. Patient agreed to Evaluation from Physical Therapist.

## 2019-09-13 NOTE — PROGRESS NOTE ADULT - SUBJECTIVE AND OBJECTIVE BOX
Patient is a 39y old  Male who presents with a chief complaint of Left foot pain and swelling (13 Sep 2019 17:17)      Vascular Surgery Attending Progress Note    Interval HPI: pt w/o new c/o good pain control    Medications:  acetaminophen   Tablet .. 650 milliGRAM(s) Oral every 6 hours PRN  cilostazol 50 milliGRAM(s) Oral two times a day  ciprofloxacin   IVPB 400 milliGRAM(s) IV Intermittent every 12 hours  dextrose 40% Gel 15 Gram(s) Oral once PRN  dextrose 5%. 1000 milliLiter(s) IV Continuous <Continuous>  dextrose 50% Injectable 12.5 Gram(s) IV Push once  dextrose 50% Injectable 25 Gram(s) IV Push once  dextrose 50% Injectable 25 Gram(s) IV Push once  ergocalciferol 80430 Unit(s) Oral <User Schedule>  glucagon  Injectable 1 milliGRAM(s) IntraMuscular once PRN  influenza   Vaccine 0.5 milliLiter(s) IntraMuscular once  insulin glargine Injectable (LANTUS) 8 Unit(s) SubCutaneous at bedtime  insulin lispro (HumaLOG) corrective regimen sliding scale   SubCutaneous three times a day before meals  insulin lispro (HumaLOG) corrective regimen sliding scale   SubCutaneous at bedtime  insulin lispro Injectable (HumaLOG) 3 Unit(s) SubCutaneous three times a day before meals  oxyCODONE    5 mG/acetaminophen 325 mG 1 Tablet(s) Oral every 4 hours PRN  sodium chloride 0.9%. 1000 milliLiter(s) IV Continuous <Continuous>  vancomycin  IVPB 1000 milliGRAM(s) IV Intermittent every 12 hours      Vital Signs Last 24 Hrs  T(C): 37.6 (13 Sep 2019 18:00), Max: 37.6 (13 Sep 2019 14:00)  T(F): 99.6 (13 Sep 2019 18:00), Max: 99.7 (13 Sep 2019 14:00)  HR: 94 (13 Sep 2019 18:00) (90 - 96)  BP: 111/60 (13 Sep 2019 18:00) (107/61 - 122/77)  BP(mean): --  RR: 17 (13 Sep 2019 18:00) (17 - 18)  SpO2: 100% (13 Sep 2019 18:00) (97% - 100%)  I&O's Summary    12 Sep 2019 07:01  -  13 Sep 2019 07:00  --------------------------------------------------------  IN: 610 mL / OUT: 1075 mL / NET: -465 mL        Physical Exam:  Neuro  A&Ox3 VSS  Vascular: dressing c/d/i     LABS:                        8.9    7.63  )-----------( 391      ( 13 Sep 2019 06:00 )             27.8     09-12    140  |  101  |  11  ----------------------------<  169<H>  4.1   |  26  |  1.15    Ca    8.8      12 Sep 2019 06:05  Phos  3.1     09-12  Mg     1.6     09-12      PT/INR - ( 12 Sep 2019 06:05 )   PT: 13.7 SEC;   INR: 1.23          PTT - ( 12 Sep 2019 06:05 )  PTT:34.4 SEC    ABE COBURN MD  [phone]

## 2019-09-13 NOTE — PROGRESS NOTE ADULT - ATTENDING COMMENTS
Seen at bedside.  Awaiting MRI.  Will need hallux amp/partial first ray and possible TMA depending on involvement of lesser mets, specifically the 2nd.  Please continue to medically optimize.
I have personally  seen and examined the patient today and have noted the findings and formulated the plan of care.
I have seen and examined the patient today and agree with  the  evaluation, assessment and plan of the surgical house officer

## 2019-09-13 NOTE — PROGRESS NOTE ADULT - ASSESSMENT
39 year old with dm and foot ulcer, prior diagnosis of Om of foot, presents with fever and foot pain with non-healing wound      1) Foot ulcer  prior cultures with citrobacter and acinitobacter  Cx now with proteus and pseudomonas    Continue Cipro/ vanco    2) Osteomyelitis of foot    Follow up OR cultures  Continue current abx  High concern for residual OM    High risk for failure with antibiotic treatment      3) DM: glycemic control key to wound healing    4) Therapeutic drug monitoring  vanco through okay     Call the ID service with questions or concerns over the weekend.  [phone]

## 2019-09-13 NOTE — CHART NOTE - NSCHARTNOTEFT_GEN_A_CORE
Please refer to progress note competed today for DM plan of care    Follow up appointments have been scheduled   Please include in discharge plan     9/16 Dr Mckinney @ [address] [facility], [address], [phone]  10/11 DM educator Elizabeth Davis @ [address] [facility], [address], [phone]

## 2019-09-13 NOTE — PROGRESS NOTE ADULT - SUBJECTIVE AND OBJECTIVE BOX
Chief Complaint: DM uncontrolled with hyperglycemia     History: Glucose now trending better controlled and at inpatient target. Patient denies complaints at the present time.     MEDICATIONS  (STANDING):  cilostazol 50 milliGRAM(s) Oral two times a day  ciprofloxacin   IVPB 400 milliGRAM(s) IV Intermittent every 12 hours  dextrose 5%. 1000 milliLiter(s) (50 mL/Hr) IV Continuous <Continuous>  dextrose 50% Injectable 12.5 Gram(s) IV Push once  dextrose 50% Injectable 25 Gram(s) IV Push once  dextrose 50% Injectable 25 Gram(s) IV Push once  ergocalciferol 82882 Unit(s) Oral <User Schedule>  influenza   Vaccine 0.5 milliLiter(s) IntraMuscular once  insulin glargine Injectable (LANTUS) 8 Unit(s) SubCutaneous at bedtime  insulin lispro (HumaLOG) corrective regimen sliding scale   SubCutaneous three times a day before meals  insulin lispro (HumaLOG) corrective regimen sliding scale   SubCutaneous at bedtime  insulin lispro Injectable (HumaLOG) 3 Unit(s) SubCutaneous three times a day before meals  sodium chloride 0.9%. 1000 milliLiter(s) (30 mL/Hr) IV Continuous <Continuous>  vancomycin  IVPB 1000 milliGRAM(s) IV Intermittent every 12 hours    MEDICATIONS  (PRN):  acetaminophen   Tablet .. 650 milliGRAM(s) Oral every 6 hours PRN Mild Pain (1 - 3)  dextrose 40% Gel 15 Gram(s) Oral once PRN Blood Glucose LESS THAN 70 milliGRAM(s)/deciliter  glucagon  Injectable 1 milliGRAM(s) IntraMuscular once PRN Glucose LESS THAN 70 milligrams/deciliter  oxyCODONE    5 mG/acetaminophen 325 mG 1 Tablet(s) Oral every 4 hours PRN Moderate Pain (4 - 6)          No Known Allergies          Review of Systems:  Constitutional: No fever  Cardiovascular: No chest pain, palpitations  Respiratory: No SOB, no cough  GI: No nausea, vomiting, abdominal pain      PHYSICAL EXAM:  VITALS: T(C): 37.2 (09-13-19 @ 06:15)  T(F): 99 (09-13-19 @ 06:15), Max: 99.4 (09-12-19 @ 22:09)  HR: 90 (09-13-19 @ 06:15) (90 - 96)  BP: 119/75 (09-13-19 @ 06:15) (119/75 - 122/77)  RR:  (17 - 18)  SpO2:  (97% - 100%)  Wt(kg): --  GENERAL: NAD  RESPIRATORY: Non labored  GI: Soft, nontender, non distended  SKIN: Left lower extremity with dressing - appears clean and dry, wound obscured defer exam to primary team h  NEURO: extraocular movements intact, no tremor  PSYCH: Alert and oriented x 3, normal affect, normal mood      CAPILLARY BLOOD GLUCOSE      POCT Blood Glucose.: 188 mg/dL (13 Sep 2019 11:50)  POCT Blood Glucose.: 142 mg/dL (13 Sep 2019 07:40)  POCT Blood Glucose.: 170 mg/dL (12 Sep 2019 22:02)  POCT Blood Glucose.: 161 mg/dL (12 Sep 2019 19:27)  POCT Blood Glucose.: 120 mg/dL (12 Sep 2019 17:15)      09-12    140  |  101  |  11  ----------------------------<  169<H>  4.1   |  26  |  1.15    EGFR if : 93  EGFR if non : 80    Ca    8.8      09-12  Mg     1.6     09-12  Phos  3.1     09-12            Thyroid Function Tests:  08-18 @ 07:04 TSH 0.45 FreeT4 -- T3 -- Anti TPO -- Anti Thyroglobulin Ab -- TSI --      Hemoglobin A1C, Whole Blood: 11.9 % <H> [4.0 - 5.6] (09-10-19 @ 05:42)  Hemoglobin A1C, Whole Blood: 14.1 % <H> [4.0 - 5.6] (08-18-19 @ 12:00)  Hemoglobin A1C, Whole Blood: 14.0 % <H> [4.0 - 5.6] (08-17-19 @ 11:20)

## 2019-09-13 NOTE — PHYSICAL THERAPY INITIAL EVALUATION ADULT - CRITERIA FOR SKILLED THERAPEUTIC INTERVENTIONS
impairments found/therapy frequency/predicted duration of therapy intervention/anticipated discharge recommendation/rehab potential

## 2019-09-13 NOTE — PROVIDER CONTACT NOTE (OTHER) - REASON
Provider made aware of delayed dose of Ciprofloxacin by night shift RN. Dose marked off as given at 05:00 but was started by day shift RN at 11:00 on 9/13.

## 2019-09-13 NOTE — PROGRESS NOTE ADULT - ASSESSMENT
39M s/p L foot partial 1st ray resection w/ 2nd metatarsal head bone biopsy- closed (DOS 9/12/19)  - Pt seen and evaluated, POD #1  - T 99, no leukocytosis  - LF surgical site sutures intact, no dehiscence, no hematoma, serosanguinous drainage, no acute signs of infection   - Redressed with Xeroform and DSD  - high concern for residual infection- awaiting final OR path/ Cx sensitives prior to d/c  - cont IV abx per ID, appreciate recs  - discussed with attending

## 2019-09-13 NOTE — PROVIDER CONTACT NOTE (OTHER) - ACTION/TREATMENT ORDERED:
Will rescheduled next dose of abx 12hr post admin time; will relay to oncoming RN. Will continue to monitor.

## 2019-09-13 NOTE — PHYSICAL THERAPY INITIAL EVALUATION ADULT - ADDITIONAL COMMENTS
Pt. has straight cane at bedside, however reports it is his mother-in-law's.    Pt. was left supine in bed post PT Evaluation, no apparent distress, all lines intact, ace wrap left foot c/d/i, call bell within reach. RN made aware of pt. status.

## 2019-09-13 NOTE — DISCHARGE NOTE PROVIDER - NSDCFUADDINST_GEN_ALL_CORE_FT
Podiatry Discharge Instructions:  Follow up: Please follow up with Dr. Izquierdo within 1 week of discharge from the hospital, please call [phone] for appointment and discuss that you recently were seen in the hospital.  Wound Care: Please leave your dressing clean dry intact until your follow up appointment.  Weight bearing: Please weight bear as tolerated in a surgical shoe.  Antibiotics: Please continue as instructed.

## 2019-09-13 NOTE — DISCHARGE NOTE PROVIDER - NSDCCAREPROVSEEN_GEN_ALL_CORE_FT
[facility] Surgery, [facility] Medicine ACP, Team  [facility] Endocrinology, Team

## 2019-09-13 NOTE — PROGRESS NOTE ADULT - SUBJECTIVE AND OBJECTIVE BOX
SUBJECTIVE / OVERNIGHT EVENTS: pt denies chest pain, shortness of breath     MEDICATIONS  (STANDING):  cilostazol 50 milliGRAM(s) Oral two times a day  ciprofloxacin   IVPB 400 milliGRAM(s) IV Intermittent every 12 hours  dextrose 5%. 1000 milliLiter(s) (50 mL/Hr) IV Continuous <Continuous>  dextrose 50% Injectable 12.5 Gram(s) IV Push once  dextrose 50% Injectable 25 Gram(s) IV Push once  dextrose 50% Injectable 25 Gram(s) IV Push once  ergocalciferol 63401 Unit(s) Oral <User Schedule>  influenza   Vaccine 0.5 milliLiter(s) IntraMuscular once  insulin glargine Injectable (LANTUS) 8 Unit(s) SubCutaneous at bedtime  insulin lispro (HumaLOG) corrective regimen sliding scale   SubCutaneous three times a day before meals  insulin lispro (HumaLOG) corrective regimen sliding scale   SubCutaneous at bedtime  insulin lispro Injectable (HumaLOG) 3 Unit(s) SubCutaneous three times a day before meals  sodium chloride 0.9%. 1000 milliLiter(s) (30 mL/Hr) IV Continuous <Continuous>  vancomycin  IVPB 1000 milliGRAM(s) IV Intermittent every 12 hours    MEDICATIONS  (PRN):  acetaminophen   Tablet .. 650 milliGRAM(s) Oral every 6 hours PRN Mild Pain (1 - 3)  dextrose 40% Gel 15 Gram(s) Oral once PRN Blood Glucose LESS THAN 70 milliGRAM(s)/deciliter  glucagon  Injectable 1 milliGRAM(s) IntraMuscular once PRN Glucose LESS THAN 70 milligrams/deciliter  oxyCODONE    5 mG/acetaminophen 325 mG 1 Tablet(s) Oral every 4 hours PRN Moderate Pain (4 - 6)    Vital Signs Last 24 Hrs  T(C): 37.6 (13 Sep 2019 18:00), Max: 37.6 (13 Sep 2019 14:00)  T(F): 99.6 (13 Sep 2019 18:00), Max: 99.7 (13 Sep 2019 14:00)  HR: 94 (13 Sep 2019 18:00) (90 - 96)  BP: 111/60 (13 Sep 2019 18:00) (107/61 - 122/77)  BP(mean): --  RR: 17 (13 Sep 2019 18:00) (17 - 18)  SpO2: 100% (13 Sep 2019 18:00) (97% - 100%)    Constitutional: No fever, fatigue  Skin: No rash.  Eyes: No recent vision problems or eye pain.  ENT: No congestion, ear pain, or sore throat.  Cardiovascular: No chest pain or palpation.  Respiratory: No cough, shortness of breath, congestion, or wheezing.  Gastrointestinal: No abdominal pain, nausea, vomiting, or diarrhea.  Genitourinary: No dysuria.  Musculoskeletal: No joint swelling.  Neurologic: No headache.    PHYSICAL EXAM:  GENERAL: NAD  EYES: EOMI, PERRLA  NECK: Supple, No JVD  CHEST/LUNG: cta charan   HEART:  S1 , S2 +  ABDOMEN: soft , bs+  EXTREMITIES: left  foot dressing+  NEUROLOGY:alert awake oriented     LABS:  09-12    140  |  101  |  11  ----------------------------<  169<H>  4.1   |  26  |  1.15    Ca    8.8      12 Sep 2019 06:05  Phos  3.1     09-12  Mg     1.6     09-12      Creatinine Trend: 1.15 <--, 1.32 <--, 1.10 <--, 1.12 <--, 1.25 <--                        8.9    7.63  )-----------( 391      ( 13 Sep 2019 06:00 )             27.8     Urine Studies:              PT/INR - ( 12 Sep 2019 06:05 )   PT: 13.7 SEC;   INR: 1.23          PTT - ( 12 Sep 2019 06:05 )  PTT:34.4 SEC        Imaging Personally Reviewed:    Consultant(s) Notes Reviewed:      Care Discussed with Consultants/Other Providers:

## 2019-09-13 NOTE — PROGRESS NOTE ADULT - SUBJECTIVE AND OBJECTIVE BOX
Follow Up:      Inverval History/ROS:Patient is a 39y old  Male who presents with a chief complaint of Left foot pain and swelling (13 Sep 2019 14:20)    No fever.   No events    S/p toe amputation left first toe ray resection and and left second toe met head biopsy    High concern for residual OM per podiatry    Allergies    No Known Allergies    Intolerances        ANTIMICROBIALS:  ciprofloxacin   IVPB 400 every 12 hours  vancomycin  IVPB 1000 every 12 hours      OTHER MEDS:  acetaminophen   Tablet .. 650 milliGRAM(s) Oral every 6 hours PRN  cilostazol 50 milliGRAM(s) Oral two times a day  dextrose 40% Gel 15 Gram(s) Oral once PRN  dextrose 5%. 1000 milliLiter(s) IV Continuous <Continuous>  dextrose 50% Injectable 12.5 Gram(s) IV Push once  dextrose 50% Injectable 25 Gram(s) IV Push once  dextrose 50% Injectable 25 Gram(s) IV Push once  ergocalciferol 93230 Unit(s) Oral <User Schedule>  glucagon  Injectable 1 milliGRAM(s) IntraMuscular once PRN  influenza   Vaccine 0.5 milliLiter(s) IntraMuscular once  insulin glargine Injectable (LANTUS) 8 Unit(s) SubCutaneous at bedtime  insulin lispro (HumaLOG) corrective regimen sliding scale   SubCutaneous three times a day before meals  insulin lispro (HumaLOG) corrective regimen sliding scale   SubCutaneous at bedtime  insulin lispro Injectable (HumaLOG) 3 Unit(s) SubCutaneous three times a day before meals  oxyCODONE    5 mG/acetaminophen 325 mG 1 Tablet(s) Oral every 4 hours PRN  sodium chloride 0.9%. 1000 milliLiter(s) IV Continuous <Continuous>      Vital Signs Last 24 Hrs  T(C): 37.2 (13 Sep 2019 06:15), Max: 37.4 (12 Sep 2019 22:09)  T(F): 99 (13 Sep 2019 06:15), Max: 99.4 (12 Sep 2019 22:09)  HR: 90 (13 Sep 2019 06:15) (90 - 96)  BP: 119/75 (13 Sep 2019 06:15) (119/75 - 122/77)  BP(mean): --  RR: 17 (13 Sep 2019 06:15) (17 - 18)  SpO2: 100% (13 Sep 2019 06:15) (97% - 100%)    PHYSICAL EXAM:  General: [ c] non-toxic  HEAD/EYES: [ ] PERRL [c ] white sclera [ ] icterus  ENT:  [ ] normal [ c] supple [ ] thrush [ ] pharyngeal exudate  Cardiovascular:   [ ] murmur [ c] normal [ ] PPM/AICD  Respiratory:  [c ] clear to ausculation bilaterally  GI:  [c ] soft, non-tender, normal bowel sounds  :  [ ] willard [ c] no CVA tenderness   Musculoskeletal:  [ ] no synovitis  Neurologic:  [ ] non-focal exam   Skin:  [ c] no rash  Lymph: [ ] no lymphadenopathy  Psychiatric:  [ ] appropriate affect [c ] alert & oriented  Lines:  [c ] no phlebitis [ ] central line                                8.9    7.63  )-----------( 391      ( 13 Sep 2019 06:00 )             27.8       09-12    140  |  101  |  11  ----------------------------<  169<H>  4.1   |  26  |  1.15    Ca    8.8      12 Sep 2019 06:05  Phos  3.1     09-12  Mg     1.6     09-12            MICROBIOLOGY:Culture - Surgical Site:   CULTURE IN PROGRESS, FURTHER REPORT TO FOLLOW. (09-12-19 @ 16:46)  Culture - Surgical Site:   CULTURE IN PROGRESS, FURTHER REPORT TO FOLLOW. (09-12-19 @ 16:45)      RADIOLOGY:

## 2019-09-13 NOTE — DISCHARGE NOTE PROVIDER - NSDCCPCAREPLAN_GEN_ALL_CORE_FT
PRINCIPAL DISCHARGE DIAGNOSIS  Diagnosis: Osteomyelitis of foot, left, acute  Assessment and Plan of Treatment:       SECONDARY DISCHARGE DIAGNOSES  Diagnosis: Uncontrolled type 2 diabetes with retinopathy  Assessment and Plan of Treatment: 9/16 Dr Mckinney @ [address] [facility], [address], [phone]  10/11 DM educator Elizabeth Davis @ [address] [place] [address], [facility] [address], [phone]. room air PRINCIPAL DISCHARGE DIAGNOSIS  Diagnosis: Osteomyelitis of foot, left, acute  Assessment and Plan of Treatment: Partial amputation of first ray of left foot on 9/12. Infectious disease recommends------   Please follow up with Podiatrist Dr. Izquierdo within 1-2 weeks of discharge for further recommendations.      SECONDARY DISCHARGE DIAGNOSES  Diagnosis: Uncontrolled type 2 diabetes with retinopathy  Assessment and Plan of Treatment: 9/16 Dr Mckinney @ [address] [place] [address], [phone]  10/11 DM educator Elizabeth Davis @ [address] [place] [address], [phone]. PRINCIPAL DISCHARGE DIAGNOSIS  Diagnosis: Osteomyelitis of foot, left, acute  Assessment and Plan of Treatment: Partial amputation of first ray of left foot on 9/12. Infectious disease recommends------   Please follow up with Podiatrist Dr. Izquierdo within 1-2 weeks of discharge for further recommendations.      SECONDARY DISCHARGE DIAGNOSES  Diagnosis: Uncontrolled type 2 diabetes with retinopathy  Assessment and Plan of Treatment: Please follow up with  Dr. Mckinney @ [address] [facility], [address], [phone]  You had an appointment with   DM educator Elizabeth Davis on 10/11/19 @ [address] [facility], [address], [phone].  Continue consistent carbohydrate diet.  Monitor blood glucose levels throughout the day before meals and at bedtime.  Record blood sugars and bring to outpatient providers appointment in order to be reviewed by your doctor for management modifications.  Be aware of diabetes management symptoms including feeling cool and clammy may be related to low glucose levels.  Feeling hot and dry may indicate high glucose levels.  If  you feel these symptoms, check your blood sugar.  Make regular podiatry appointments in order to have feet checked for wounds and toe nails cut by a doctor to prevent infections. PRINCIPAL DISCHARGE DIAGNOSIS  Diagnosis: Osteomyelitis of foot, left, acute  Assessment and Plan of Treatment: Partial amputation of first ray of left foot on 9/12. Infectious disease recommends  vancomycin and cipro for 6 weeks though PICC line.  Please send Weekly cbc, bmp, vanco through fax to [phone].   Please follow up with Podiatrist Dr. Izquierdo within 1-2 weeks of discharge for further recommendations. Continue to use crutches for at least two weeks from when surgery was done. Limit weight bearing to surgery site.      SECONDARY DISCHARGE DIAGNOSES  Diagnosis: Uncontrolled type 2 diabetes with retinopathy  Assessment and Plan of Treatment: Please follow up with  Dr. Mckinney @ [address] [facility], [address], [phone]. You have an appointmetn 11/26/19 at 11pm.   You had an appointment with   DM educator Elizabeth Davis on 10/11/19 @ 3:45 pm. [address] [facility], [address],  [phone].   Insulin was sent to pharmacy to check insurance eligibility.   Continue consistent carbohydrate diet.  Monitor blood glucose levels throughout the day before meals and at bedtime.  Record blood sugars and bring to outpatient providers appointment in order to be reviewed by your doctor for management modifications.  Be aware of diabetes management symptoms including feeling cool and clammy may be related to low glucose levels.  Feeling hot and dry may indicate high glucose levels.  If  you feel these symptoms, check your blood sugar.  Make regular podiatry appointments in order to have feet checked for wounds and toe nails cut by a doctor to prevent infections.

## 2019-09-13 NOTE — DISCHARGE NOTE PROVIDER - CARE PROVIDERS DIRECT ADDRESSES
,[email or web address] ,[email or web address],[email or web address]

## 2019-09-13 NOTE — PROGRESS NOTE ADULT - PROBLEM SELECTOR PLAN 1
See complete consult for full plan of care including HLD target and recommendation   - Glucose improved and now at inpatient target of 100-180  - for now, would monitor on Humalog 3 units TID and Lantus 10 units qhs. If NPO tonight, would reduce Lantus to 8 units qhs  - c/w low correction scale qac and qhs  - continue carb consistent diet  - patient does not have typical phenotype of DM2 (not signs of insulin resistance on physical exam, no BMI recorded but appears to have normal BMI on exam, no family history of DM), thus would rule out DAWIT/DM1. F/U islet cell, HAIM and will send C-peptide tomorrow AM along with serum glucose   - for discharge: likely basal bolus insulin, doses to be determined. Will need to determine how patient can store his insulin at his shelter. If his insulin requirements are low and his antibodies are negative, can consider dc on basal + oral agents. final plan to be determined. He can follow up in the office on discharge - [phone]- Will email office to reach out to him to schedule appointments See complete consult for full plan of care including HLD target and recommendation   - Glucose improved and now at inpatient target of 100-180  - for now, would monitor on Humalog 3 units TID and Lantus 10 units qhs. If NPO tonight, would reduce Lantus to 8 units qhs  - c/w low correction scale qac and qhs  - continue carb consistent diet  - patient does not have typical phenotype of DM2 (ot signs of insulin resistance on physical exam, no BMI recorded but appears to have normal BMI on exam, no family history of DM), thus would rule out DAWIT/DM1. F/U islet cell, HAIM and will send C-peptide tomorrow AM along with serum glucose   - for discharge: likely basal bolus insulin, doses to be determined. Will need to determine how patient can store his insulin at his shelter. If his insulin requirements are low and his antibodies are negative, can consider dc on basal + oral agents. final plan to be determined. He can follow up in the office on discharge - [phone]- Will email office to reach out to him to schedule appointments

## 2019-09-13 NOTE — PROGRESS NOTE ADULT - ASSESSMENT
39 year old male with uncontrolled DM2, HTN, HLD, vitamin D deficiency, and history of left toe amputation (December 2018), here with osteomyelitis of the left foot, also with hyperglycemia in setting of uncontrolled DM2. BG goal is 100-180 mg/dL.

## 2019-09-13 NOTE — PHYSICAL THERAPY INITIAL EVALUATION ADULT - PERTINENT HX OF CURRENT PROBLEM, REHAB EVAL
Pt. admitted for left foot pain and swelling. Pt. s/p left foot partial 1st ray resection with 2nd metatarsal head bone biopsy on 9/12/19.

## 2019-09-13 NOTE — DISCHARGE NOTE PROVIDER - PROVIDER TOKENS
PROVIDER:[TOKEN:[[id number]]] PROVIDER:[TOKEN:[[id number]]],FREE:[LAST:[Dr Mckinney],PHONE:[(   )    -],FAX:[(   )    -],ADDRESS:[on 9/16 @ [address] [place] [address], [place] [address], [phone]    10/11 DM educator Elizabeth Davis @ [address] [place] [address], [place] [address], [phone].]] PROVIDER:[TOKEN:[[id number]]],PROVIDER:[TOKEN:[[id number]]],FREE:[LAST:[Endocrine],PHONE:[(   )    -],FAX:[(   )    -],ADDRESS:[Dr Mckinney, on 11/26/19 @ [address] [place] [address], [address], [phone]    10/11 DM educator Elizabeth Davis @ [address] [place] [address], [address], [phone].]]

## 2019-09-13 NOTE — DISCHARGE NOTE PROVIDER - CARE PROVIDER_API CALL
Pro Izquierdo (DPM)  Podiatric Medicine and Surgery  [address] [place] [address]  Phone: [phone]  Fax: [phone]  Follow Up Time: Pro Izquierdo (DPM)  Podiatric Medicine and Surgery  [address] [place] [address]  Phone: [phone]  Fax: [phone]  Follow Up Time:     Dr Mckinney,   on 9/16 @ [address] [facility], [address], [phone]    10/11 DM educator Elizabeth Davis @ [address] [place] [address], [address], [phone].  Phone: (   )    -  Fax: (   )    -  Follow Up Time: Pro Izquierdo (DPM)  Podiatric Medicine and Surgery  [address] [place] [address]  Phone: [phone]  Fax: [phone]  Follow Up Time:     Nick Nix)  Infectious Disease  [address] [place] [address]  Phone: [phone]  Fax: [phone]  Follow Up Time:     Endocrine,   Dr Mckinney, on 11/26/19 @ [address] [facility], [address], [phone]    10/11 DM educator Elizabeth Davis @ [address] [place] [address], [address], [phone].  Phone: (   )    -  Fax: (   )    -  Follow Up Time:

## 2019-09-13 NOTE — PROGRESS NOTE ADULT - SUBJECTIVE AND OBJECTIVE BOX
Patient is a 39y old  Male who presents with a chief complaint of Left foot pain and swelling (13 Sep 2019 07:37)       INTERVAL HPI/OVERNIGHT EVENTS:  Patient seen and evaluated at bedside.  Pt is resting comfortable in NAD. Denies N/V/F/C.     Allergies    No Known Allergies    Intolerances        Vital Signs Last 24 Hrs  T(C): 37.2 (13 Sep 2019 06:15), Max: 37.4 (12 Sep 2019 22:09)  T(F): 99 (13 Sep 2019 06:15), Max: 99.4 (12 Sep 2019 22:09)  HR: 90 (13 Sep 2019 06:15) (90 - 96)  BP: 119/75 (13 Sep 2019 06:15) (115/62 - 131/75)  BP(mean): --  RR: 17 (13 Sep 2019 06:15) (16 - 18)  SpO2: 100% (13 Sep 2019 06:15) (97% - 100%)    LABS:                        8.9    7.63  )-----------( 391      ( 13 Sep 2019 06:00 )             27.8     09-12    140  |  101  |  11  ----------------------------<  169<H>  4.1   |  26  |  1.15    Ca    8.8      12 Sep 2019 06:05  Phos  3.1     09-12  Mg     1.6     09-12      PT/INR - ( 12 Sep 2019 06:05 )   PT: 13.7 SEC;   INR: 1.23          PTT - ( 12 Sep 2019 06:05 )  PTT:34.4 SEC    CAPILLARY BLOOD GLUCOSE      POCT Blood Glucose.: 188 mg/dL (13 Sep 2019 11:50)  POCT Blood Glucose.: 142 mg/dL (13 Sep 2019 07:40)  POCT Blood Glucose.: 170 mg/dL (12 Sep 2019 22:02)  POCT Blood Glucose.: 161 mg/dL (12 Sep 2019 19:27)  POCT Blood Glucose.: 120 mg/dL (12 Sep 2019 17:15)      Lower Extremity Physical Exam:  Vascular: DP/PT 2/4, B/L, CFT <3 seconds B/L, Temperature gradient warm to warm B/L.   Neuro: Epicritic sensation absent to the level of midfoot, B/L.  Musculoskeletal/Ortho: s/p LF partial fourth ray resection  Derm: s/p L foot partial 1st ray resection w/ 2nd metatarsal head bone biopsy- closed (DOS 9/12/19)  - surgical site sutures intact, no hematoma, mild serosanguinous drainage, no dehiscence, no acute signs of infection

## 2019-09-13 NOTE — PROGRESS NOTE ADULT - PROBLEM SELECTOR PLAN 1
cont present abx , ID and Vascular f/u   s/p L foot partial 1st ray resection w/ 2nd metatarsal head bone biopsy- closed

## 2019-09-13 NOTE — DISCHARGE NOTE PROVIDER - NSDCFUSCHEDAPPT_GEN_ALL_CORE_FT
COLE FERNANDEZ ; 09/16/2019 ; NPP Med Endocr [address] [facility]  COLE FERNANDEZ ; 10/11/2019 ; NPP Endocrin [address] [facility] COLE FERNANDEZ ; 09/16/2019 ; NPP Med Endocr [address] [facility]  COLE FERNANDEZ ; 10/11/2019 ; NPP Endocrin [address] [facility] COLE FERNANDEZ ; 09/16/2019 ; NPP Med Endocr [address] [facility]  COLE FERNANDEZ ; 10/11/2019 ; NPP Endocrin [address] [facility] COLE FERNANDEZ ; 10/11/2019 ; NPP Endocrin [address] [facility]  COLE FERNANDEZ ; 11/26/2019 ; NPP Med Endocr [address] [facility] COLE FERNNADEZ ; 10/11/2019 ; NPP Endocrin [address] [facility]  COLE FERNANDEZ ; 11/26/2019 ; NPP Med Endocr [address] [facility]

## 2019-09-13 NOTE — DISCHARGE NOTE PROVIDER - HOSPITAL COURSE
39M w/ PMHx of DM w/ c/c of LF plantar hallux wound with swelling and pain. LF XR showing OM to the left hallux. Left foot plantar hallux dactylitis w/ wound down to bone. MRI + OM. Pt treated w/ Abx, underwent resection and biopsy of 2nd met with Dr. Izquierdo 39M w/ PMHx of DM w/ c/c of LF plantar hallux wound with swelling and pain. LF XR showing OM to the left hallux. Left foot plantar hallux dactylitis w/ wound down to bone. MRI + OM. Pt treated w/ Abx, underwent resection and biopsy of 2nd met with Dr. Izquierdo. Crutches ordered- to be used for 2 weeks to limit weightbearing to sx site. Id recs vancomycin and cipro for 6 weeks Weekly cbc, bmp, vanco through fax to [phone]. ENdo consulted - for discharge: likely basal bolus insulin, doses to be determined. Will need to determine how patient can store his insulin at his shelter. Ptcan follow up in the office on discharge - [phone] visit [address] [place] [address] 10/11/19. 39M w/ PMHx of DM w/ c/c of LF plantar hallux wound with swelling and pain. LF XR showing OM to the left hallux. Left foot plantar hallux dactylitis w/ wound down to bone. MRI + OM. Pt treated w/ Abx, underwent resection and biopsy of 2nd met with Dr. Izquierdo. Crutches ordered- to be used for 2 weeks to limit weightbearing to sx site. Id recs vancomycin and cipro for 6 weeks Weekly cbc, bmp, vanco through fax to [phone]. picc placed 9/23/19.  ENdo consulted - for discharge: basal bolus insulin, Insulin covered. Pt can follow up in the office on discharge - [phone] visit [address] [place] [address] 10/11/19.

## 2019-09-14 LAB
ANION GAP SERPL CALC-SCNC: 10 MMO/L — SIGNIFICANT CHANGE UP (ref 7–14)
BACTERIA BLD CULT: SIGNIFICANT CHANGE UP
BACTERIA BLD CULT: SIGNIFICANT CHANGE UP
BUN SERPL-MCNC: 14 MG/DL — SIGNIFICANT CHANGE UP (ref 7–23)
C PEPTIDE SERPL-MCNC: 2 NG/ML — SIGNIFICANT CHANGE UP (ref 1.1–4.4)
CALCIUM SERPL-MCNC: 8.2 MG/DL — LOW (ref 8.4–10.5)
CHLORIDE SERPL-SCNC: 102 MMOL/L — SIGNIFICANT CHANGE UP (ref 98–107)
CO2 SERPL-SCNC: 26 MMOL/L — SIGNIFICANT CHANGE UP (ref 22–31)
CREAT SERPL-MCNC: 1.29 MG/DL — SIGNIFICANT CHANGE UP (ref 0.5–1.3)
GLUCOSE BLDC GLUCOMTR-MCNC: 180 MG/DL — HIGH (ref 70–99)
GLUCOSE BLDC GLUCOMTR-MCNC: 214 MG/DL — HIGH (ref 70–99)
GLUCOSE BLDC GLUCOMTR-MCNC: 220 MG/DL — HIGH (ref 70–99)
GLUCOSE BLDC GLUCOMTR-MCNC: 239 MG/DL — HIGH (ref 70–99)
GLUCOSE SERPL-MCNC: 222 MG/DL — HIGH (ref 70–99)
POTASSIUM SERPL-MCNC: 4.1 MMOL/L — SIGNIFICANT CHANGE UP (ref 3.5–5.3)
POTASSIUM SERPL-SCNC: 4.1 MMOL/L — SIGNIFICANT CHANGE UP (ref 3.5–5.3)
SODIUM SERPL-SCNC: 138 MMOL/L — SIGNIFICANT CHANGE UP (ref 135–145)

## 2019-09-14 RX ADMIN — Medication 250 MILLIGRAM(S): at 23:55

## 2019-09-14 RX ADMIN — Medication 3 UNIT(S): at 11:37

## 2019-09-14 RX ADMIN — Medication 1: at 07:57

## 2019-09-14 RX ADMIN — SODIUM CHLORIDE 30 MILLILITER(S): 9 INJECTION INTRAMUSCULAR; INTRAVENOUS; SUBCUTANEOUS at 03:54

## 2019-09-14 RX ADMIN — CILOSTAZOL 50 MILLIGRAM(S): 100 TABLET ORAL at 06:14

## 2019-09-14 RX ADMIN — Medication 2: at 11:37

## 2019-09-14 RX ADMIN — Medication 3 UNIT(S): at 07:57

## 2019-09-14 RX ADMIN — CILOSTAZOL 50 MILLIGRAM(S): 100 TABLET ORAL at 21:18

## 2019-09-14 RX ADMIN — Medication 200 MILLIGRAM(S): at 08:55

## 2019-09-14 RX ADMIN — Medication 250 MILLIGRAM(S): at 02:39

## 2019-09-14 RX ADMIN — Medication 200 MILLIGRAM(S): at 18:08

## 2019-09-14 RX ADMIN — Medication 250 MILLIGRAM(S): at 11:37

## 2019-09-14 RX ADMIN — INSULIN GLARGINE 8 UNIT(S): 100 INJECTION, SOLUTION SUBCUTANEOUS at 21:18

## 2019-09-14 RX ADMIN — Medication 3 UNIT(S): at 18:09

## 2019-09-14 NOTE — PROGRESS NOTE ADULT - ASSESSMENT
39M s/p L foot partial 1st ray resection w/ 2nd metatarsal head bone biopsy- closed (DOS 9/12/19)  - Pt seen and evaluated, POD #2  - T 99.6, no leukocytosis  - LF surgical site sutures intact, no dehiscence, no hematoma, serosanguinous drainage, no acute signs of infection, maceration to medial border of incision   - Applied Betadine and DSD  - high concern for residual infection- awaiting final OR path/ Cx sensitives prior to d/c  - cont IV abx per ID, appreciate recs  - ordered crutches- to be used for 2 weeks to limit weightbearing to sx site   - Seen with attending

## 2019-09-14 NOTE — PROGRESS NOTE ADULT - SUBJECTIVE AND OBJECTIVE BOX
Patient is a 39y old  Male who presents with a chief complaint of Left foot pain and swelling (13 Sep 2019 20:18)       INTERVAL HPI/OVERNIGHT EVENTS:  Patient seen and evaluated at bedside.  Pt is resting comfortable in NAD. Denies N/V/F/C.    Allergies    No Known Allergies    Intolerances        Vital Signs Last 24 Hrs  T(C): 37.1 (14 Sep 2019 13:43), Max: 37.6 (13 Sep 2019 18:00)  T(F): 98.8 (14 Sep 2019 13:43), Max: 99.6 (13 Sep 2019 18:00)  HR: 71 (14 Sep 2019 13:43) (71 - 94)  BP: 100/52 (14 Sep 2019 13:43) (100/52 - 111/60)  BP(mean): --  RR: 17 (14 Sep 2019 13:43) (17 - 18)  SpO2: 100% (14 Sep 2019 13:43) (98% - 100%)    LABS:                        8.9    7.63  )-----------( 391      ( 13 Sep 2019 06:00 )             27.8     09-14    138  |  102  |  14  ----------------------------<  222<H>  4.1   |  26  |  1.29    Ca    8.2<L>      14 Sep 2019 06:10          CAPILLARY BLOOD GLUCOSE      POCT Blood Glucose.: 220 mg/dL (14 Sep 2019 11:17)  POCT Blood Glucose.: 180 mg/dL (14 Sep 2019 07:32)  POCT Blood Glucose.: 282 mg/dL (13 Sep 2019 22:21)  POCT Blood Glucose.: 197 mg/dL (13 Sep 2019 17:05)      Lower Extremity Physical Exam:  Vascular: DP/PT 2/4, B/L, CFT <3 seconds B/L, Temperature gradient warm to warm B/L.   Neuro: Epicritic sensation absent to the level of midfoot, B/L.  Musculoskeletal/Ortho: s/p LF partial fourth ray resection  Derm: s/p L foot partial 1st ray resection w/ 2nd metatarsal head bone biopsy- closed (DOS 9/12/19)  POD#2- surgical site sutures intact, no hematoma, mild serosanguinous drainage, no dehiscence, no acute signs of infection, maceration to medial incision-likely from weight bearing

## 2019-09-14 NOTE — PROGRESS NOTE ADULT - SUBJECTIVE AND OBJECTIVE BOX
SUBJECTIVE / OVERNIGHT EVENTS: pt denies chest pain, shortness of breath     MEDICATIONS  (STANDING):  cilostazol 50 milliGRAM(s) Oral two times a day  ciprofloxacin   IVPB 400 milliGRAM(s) IV Intermittent every 12 hours  dextrose 5%. 1000 milliLiter(s) (50 mL/Hr) IV Continuous <Continuous>  dextrose 50% Injectable 12.5 Gram(s) IV Push once  dextrose 50% Injectable 25 Gram(s) IV Push once  dextrose 50% Injectable 25 Gram(s) IV Push once  ergocalciferol 08939 Unit(s) Oral <User Schedule>  influenza   Vaccine 0.5 milliLiter(s) IntraMuscular once  insulin glargine Injectable (LANTUS) 8 Unit(s) SubCutaneous at bedtime  insulin lispro (HumaLOG) corrective regimen sliding scale   SubCutaneous three times a day before meals  insulin lispro (HumaLOG) corrective regimen sliding scale   SubCutaneous at bedtime  insulin lispro Injectable (HumaLOG) 3 Unit(s) SubCutaneous three times a day before meals  sodium chloride 0.9%. 1000 milliLiter(s) (30 mL/Hr) IV Continuous <Continuous>  vancomycin  IVPB 1000 milliGRAM(s) IV Intermittent every 12 hours    MEDICATIONS  (PRN):  acetaminophen   Tablet .. 650 milliGRAM(s) Oral every 6 hours PRN Mild Pain (1 - 3)  dextrose 40% Gel 15 Gram(s) Oral once PRN Blood Glucose LESS THAN 70 milliGRAM(s)/deciliter  glucagon  Injectable 1 milliGRAM(s) IntraMuscular once PRN Glucose LESS THAN 70 milligrams/deciliter  oxyCODONE    5 mG/acetaminophen 325 mG 1 Tablet(s) Oral every 4 hours PRN Moderate Pain (4 - 6)    Vital Signs Last 24 Hrs  T(C): 37 (14 Sep 2019 21:37), Max: 37.1 (14 Sep 2019 06:15)  T(F): 98.6 (14 Sep 2019 21:37), Max: 98.8 (14 Sep 2019 13:43)  HR: 80 (14 Sep 2019 21:37) (71 - 80)  BP: 117/67 (14 Sep 2019 21:37) (100/52 - 117/67)  BP(mean): --  RR: 18 (14 Sep 2019 21:37) (17 - 18)  SpO2: 100% (14 Sep 2019 21:37) (98% - 100%)    Constitutional: No fever, fatigu  Skin: No rash.  Eyes: No recent vision problems or eye pain.  ENT: No congestion, ear pain, or sore throat.  Cardiovascular: No chest pain or palpation.  Respiratory: No cough, shortness of breath, congestion, or wheezing.  Gastrointestinal: No abdominal pain, nausea, vomiting, or diarrhea.  Genitourinary: No dysuria.  Musculoskeletal: No joint swelling.  Neurologic: No headache.    PHYSICAL EXAM:  GENERAL: NAD  EYES: EOMI, PERRLA  NECK: Supple, No JVD  CHEST/LUNG: cta charan   HEART:  S1 , S2 +  ABDOMEN: soft , bs+  EXTREMITIES: left  foot dressing+  NEUROLOGY:alert awake oriented     LABS:  09-14    138  |  102  |  14  ----------------------------<  222<H>  4.1   |  26  |  1.29    Ca    8.2<L>      14 Sep 2019 06:10      Creatinine Trend: 1.29 <--, 1.15 <--, 1.32 <--, 1.10 <--, 1.12 <--, 1.25 <--                        8.9    7.63  )-----------( 391      ( 13 Sep 2019 06:00 )             27.8     Urine Studies:                    PT/INR - ( 12 Sep 2019 06:05 )   PT: 13.7 SEC;   INR: 1.23          PTT - ( 12 Sep 2019 06:05 )  PTT:34.4 SEC        Imaging Personally Reviewed:    Consultant(s) Notes Reviewed:      Care Discussed with Consultants/Other Providers:

## 2019-09-15 LAB
-  AMIKACIN: SIGNIFICANT CHANGE UP
-  AZTREONAM: SIGNIFICANT CHANGE UP
-  CEFEPIME: SIGNIFICANT CHANGE UP
-  CEFTAZIDIME: SIGNIFICANT CHANGE UP
-  GENTAMICIN: SIGNIFICANT CHANGE UP
-  IMIPENEM: SIGNIFICANT CHANGE UP
-  LEVOFLOXACIN: SIGNIFICANT CHANGE UP
-  MEROPENEM: SIGNIFICANT CHANGE UP
-  PIPERACILLIN/TAZOBACTAM: SIGNIFICANT CHANGE UP
-  TOBRAMYCIN: SIGNIFICANT CHANGE UP
GLUCOSE BLDC GLUCOMTR-MCNC: 108 MG/DL — HIGH (ref 70–99)
GLUCOSE BLDC GLUCOMTR-MCNC: 170 MG/DL — HIGH (ref 70–99)
GLUCOSE BLDC GLUCOMTR-MCNC: 195 MG/DL — HIGH (ref 70–99)
GLUCOSE BLDC GLUCOMTR-MCNC: 257 MG/DL — HIGH (ref 70–99)
GRAM STN WND: SIGNIFICANT CHANGE UP
ISLET CELL512 AB SER-ACNC: SIGNIFICANT CHANGE UP
METHOD TYPE: SIGNIFICANT CHANGE UP
ORGANISM # SPEC MICROSCOPIC CNT: SIGNIFICANT CHANGE UP

## 2019-09-15 RX ADMIN — Medication 1: at 22:05

## 2019-09-15 RX ADMIN — Medication 200 MILLIGRAM(S): at 17:03

## 2019-09-15 RX ADMIN — Medication 3 UNIT(S): at 17:03

## 2019-09-15 RX ADMIN — Medication 3 UNIT(S): at 11:44

## 2019-09-15 RX ADMIN — Medication 250 MILLIGRAM(S): at 11:45

## 2019-09-15 RX ADMIN — CILOSTAZOL 50 MILLIGRAM(S): 100 TABLET ORAL at 18:06

## 2019-09-15 RX ADMIN — Medication 1: at 07:47

## 2019-09-15 RX ADMIN — CILOSTAZOL 50 MILLIGRAM(S): 100 TABLET ORAL at 05:56

## 2019-09-15 RX ADMIN — Medication 200 MILLIGRAM(S): at 05:58

## 2019-09-15 RX ADMIN — Medication 3 UNIT(S): at 07:47

## 2019-09-15 RX ADMIN — Medication 1: at 17:04

## 2019-09-15 RX ADMIN — INSULIN GLARGINE 8 UNIT(S): 100 INJECTION, SOLUTION SUBCUTANEOUS at 22:04

## 2019-09-15 NOTE — PROGRESS NOTE ADULT - SUBJECTIVE AND OBJECTIVE BOX
Patient is a 39y old  Male who presents with a chief complaint of Left foot pain and swelling (14 Sep 2019 16:00)       INTERVAL HPI/OVERNIGHT EVENTS:  Patient seen and evaluated at bedside.  Pt is resting comfortable in NAD. Denies N/V/F/C.      Allergies    No Known Allergies    Intolerances        Vital Signs Last 24 Hrs  T(C): 37 (14 Sep 2019 21:37), Max: 37.1 (14 Sep 2019 13:43)  T(F): 98.6 (14 Sep 2019 21:37), Max: 98.8 (14 Sep 2019 13:43)  HR: 80 (14 Sep 2019 21:37) (71 - 80)  BP: 117/67 (14 Sep 2019 21:37) (100/52 - 117/67)  BP(mean): --  RR: 18 (14 Sep 2019 21:37) (17 - 18)  SpO2: 100% (14 Sep 2019 21:37) (100% - 100%)    LABS:    09-14    138  |  102  |  14  ----------------------------<  222<H>  4.1   |  26  |  1.29    Ca    8.2<L>      14 Sep 2019 06:10          CAPILLARY BLOOD GLUCOSE      POCT Blood Glucose.: 170 mg/dL (15 Sep 2019 07:41)  POCT Blood Glucose.: 239 mg/dL (14 Sep 2019 21:16)  POCT Blood Glucose.: 214 mg/dL (14 Sep 2019 16:46)  POCT Blood Glucose.: 220 mg/dL (14 Sep 2019 11:17)      Lower Extremity Physical Exam:  Vascular: DP/PT 2/4, B/L, CFT <3 seconds B/L, Temperature gradient warm to warm B/L.   Neuro: Epicritic sensation absent to the level of midfoot, B/L.  Musculoskeletal/Ortho: s/p LF partial fourth ray resection  Derm: s/p L foot partial 1st ray resection w/ 2nd metatarsal head bone biopsy- closed (DOS 9/12/19)  POD#3- surgical site sutures intact, no hematoma, scant sanguinous drainage, no dehiscence, no acute signs of infection, maceration to medial incision-likely from weight bearing     RADIOLOGY & ADDITIONAL TESTS:

## 2019-09-15 NOTE — PROGRESS NOTE ADULT - ASSESSMENT
39M s/p L foot partial 1st ray resection w/ 2nd metatarsal head bone biopsy- closed (DOS 9/12/19)  - Pt seen and evaluated, POD #3  - Afebrile, no leukocytosis  - LF surgical site sutures intact, no dehiscence, no hematoma, scant sanguinous drainage, no acute signs of infection, maceration to medial border of incision   - Applied Betadine and DSD  - high concern for residual infection- awaiting final OR path/ Cx sensitives prior to d/c  - cont IV abx per ID, appreciate recs  - ordered crutches- to be used for 2 weeks to limit weightbearing to sx site   - d/w with attending

## 2019-09-15 NOTE — PROGRESS NOTE ADULT - SUBJECTIVE AND OBJECTIVE BOX
SUBJECTIVE / OVERNIGHT EVENTS: pt denies chest pain, shortness of breath     MEDICATIONS  (STANDING):  cilostazol 50 milliGRAM(s) Oral two times a day  ciprofloxacin   IVPB 400 milliGRAM(s) IV Intermittent every 12 hours  dextrose 5%. 1000 milliLiter(s) (50 mL/Hr) IV Continuous <Continuous>  dextrose 50% Injectable 12.5 Gram(s) IV Push once  dextrose 50% Injectable 25 Gram(s) IV Push once  dextrose 50% Injectable 25 Gram(s) IV Push once  ergocalciferol 87472 Unit(s) Oral <User Schedule>  influenza   Vaccine 0.5 milliLiter(s) IntraMuscular once  insulin glargine Injectable (LANTUS) 8 Unit(s) SubCutaneous at bedtime  insulin lispro (HumaLOG) corrective regimen sliding scale   SubCutaneous three times a day before meals  insulin lispro (HumaLOG) corrective regimen sliding scale   SubCutaneous at bedtime  insulin lispro Injectable (HumaLOG) 3 Unit(s) SubCutaneous three times a day before meals  sodium chloride 0.9%. 1000 milliLiter(s) (30 mL/Hr) IV Continuous <Continuous>  vancomycin  IVPB 1000 milliGRAM(s) IV Intermittent every 12 hours    MEDICATIONS  (PRN):  acetaminophen   Tablet .. 650 milliGRAM(s) Oral every 6 hours PRN Mild Pain (1 - 3)  dextrose 40% Gel 15 Gram(s) Oral once PRN Blood Glucose LESS THAN 70 milliGRAM(s)/deciliter  glucagon  Injectable 1 milliGRAM(s) IntraMuscular once PRN Glucose LESS THAN 70 milligrams/deciliter  oxyCODONE    5 mG/acetaminophen 325 mG 1 Tablet(s) Oral every 4 hours PRN Moderate Pain (4 - 6)    Vital Signs Last 24 Hrs  T(C): 37.1 (15 Sep 2019 22:11), Max: 37.1 (15 Sep 2019 22:11)  T(F): 98.7 (15 Sep 2019 22:11), Max: 98.7 (15 Sep 2019 22:11)  HR: 77 (15 Sep 2019 22:11) (77 - 78)  BP: 114/69 (15 Sep 2019 22:11) (109/61 - 114/69)  BP(mean): --  RR: 17 (15 Sep 2019 22:11) (17 - 17)  SpO2: 100% (15 Sep 2019 22:11) (100% - 100%)  Constitutional: No fever, fatigu  Skin: No rash.  Eyes: No recent vision problems or eye pain.  ENT: No congestion, ear pain, or sore throat.  Cardiovascular: No chest pain or palpation.  Respiratory: No cough, shortness of breath, congestion, or wheezing.  Gastrointestinal: No abdominal pain, nausea, vomiting, or diarrhea.  Genitourinary: No dysuria.  Musculoskeletal: No joint swelling.  Neurologic: No headache.    PHYSICAL EXAM:  GENERAL: NAD  EYES: EOMI, PERRLA  NECK: Supple, No JVD  CHEST/LUNG: cta charan   HEART:  S1 , S2 +  ABDOMEN: soft , bs+  EXTREMITIES: left  foot dressing+  NEUROLOGY:alert awake oriented     LABS:  09-14    138  |  102  |  14  ----------------------------<  222<H>  4.1   |  26  |  1.29    Ca    8.2<L>      14 Sep 2019 06:10      Creatinine Trend: 1.29 <--, 1.15 <--, 1.32 <--, 1.10 <--, 1.12 <--, 1.25 <--    Urine Studies:

## 2019-09-16 ENCOUNTER — APPOINTMENT (OUTPATIENT)
Dept: ENDOCRINOLOGY | Facility: CLINIC | Age: 39
End: 2019-09-16

## 2019-09-16 LAB
ALBUMIN SERPL ELPH-MCNC: 2.6 G/DL — LOW (ref 3.3–5)
ALP SERPL-CCNC: 50 U/L — SIGNIFICANT CHANGE UP (ref 40–120)
ALT FLD-CCNC: 17 U/L — SIGNIFICANT CHANGE UP (ref 4–41)
ANION GAP SERPL CALC-SCNC: 10 MMO/L — SIGNIFICANT CHANGE UP (ref 7–14)
AST SERPL-CCNC: 20 U/L — SIGNIFICANT CHANGE UP (ref 4–40)
BASOPHILS # BLD AUTO: 0.02 K/UL — SIGNIFICANT CHANGE UP (ref 0–0.2)
BASOPHILS NFR BLD AUTO: 0.4 % — SIGNIFICANT CHANGE UP (ref 0–2)
BILIRUB SERPL-MCNC: < 0.2 MG/DL — LOW (ref 0.2–1.2)
BUN SERPL-MCNC: 10 MG/DL — SIGNIFICANT CHANGE UP (ref 7–23)
CALCIUM SERPL-MCNC: 8.3 MG/DL — LOW (ref 8.4–10.5)
CHLORIDE SERPL-SCNC: 103 MMOL/L — SIGNIFICANT CHANGE UP (ref 98–107)
CO2 SERPL-SCNC: 24 MMOL/L — SIGNIFICANT CHANGE UP (ref 22–31)
CREAT SERPL-MCNC: 1.07 MG/DL — SIGNIFICANT CHANGE UP (ref 0.5–1.3)
EOSINOPHIL # BLD AUTO: 0.17 K/UL — SIGNIFICANT CHANGE UP (ref 0–0.5)
EOSINOPHIL NFR BLD AUTO: 3.6 % — SIGNIFICANT CHANGE UP (ref 0–6)
GLUCOSE BLDC GLUCOMTR-MCNC: 105 MG/DL — HIGH (ref 70–99)
GLUCOSE BLDC GLUCOMTR-MCNC: 141 MG/DL — HIGH (ref 70–99)
GLUCOSE BLDC GLUCOMTR-MCNC: 181 MG/DL — HIGH (ref 70–99)
GLUCOSE BLDC GLUCOMTR-MCNC: 273 MG/DL — HIGH (ref 70–99)
GLUCOSE SERPL-MCNC: 205 MG/DL — HIGH (ref 70–99)
HCT VFR BLD CALC: 28.9 % — LOW (ref 39–50)
HGB BLD-MCNC: 8.9 G/DL — LOW (ref 13–17)
IMM GRANULOCYTES NFR BLD AUTO: 0.2 % — SIGNIFICANT CHANGE UP (ref 0–1.5)
LYMPHOCYTES # BLD AUTO: 1.27 K/UL — SIGNIFICANT CHANGE UP (ref 1–3.3)
LYMPHOCYTES # BLD AUTO: 26.9 % — SIGNIFICANT CHANGE UP (ref 13–44)
MCHC RBC-ENTMCNC: 26.8 PG — LOW (ref 27–34)
MCHC RBC-ENTMCNC: 30.8 % — LOW (ref 32–36)
MCV RBC AUTO: 87 FL — SIGNIFICANT CHANGE UP (ref 80–100)
MONOCYTES # BLD AUTO: 0.35 K/UL — SIGNIFICANT CHANGE UP (ref 0–0.9)
MONOCYTES NFR BLD AUTO: 7.4 % — SIGNIFICANT CHANGE UP (ref 2–14)
NEUTROPHILS # BLD AUTO: 2.9 K/UL — SIGNIFICANT CHANGE UP (ref 1.8–7.4)
NEUTROPHILS NFR BLD AUTO: 61.5 % — SIGNIFICANT CHANGE UP (ref 43–77)
NRBC # FLD: 0 K/UL — SIGNIFICANT CHANGE UP (ref 0–0)
PLATELET # BLD AUTO: 384 K/UL — SIGNIFICANT CHANGE UP (ref 150–400)
PMV BLD: 9.1 FL — SIGNIFICANT CHANGE UP (ref 7–13)
POTASSIUM SERPL-MCNC: 3.9 MMOL/L — SIGNIFICANT CHANGE UP (ref 3.5–5.3)
POTASSIUM SERPL-SCNC: 3.9 MMOL/L — SIGNIFICANT CHANGE UP (ref 3.5–5.3)
PROT SERPL-MCNC: 6.9 G/DL — SIGNIFICANT CHANGE UP (ref 6–8.3)
RBC # BLD: 3.32 M/UL — LOW (ref 4.2–5.8)
RBC # FLD: 12.5 % — SIGNIFICANT CHANGE UP (ref 10.3–14.5)
SODIUM SERPL-SCNC: 137 MMOL/L — SIGNIFICANT CHANGE UP (ref 135–145)
WBC # BLD: 4.72 K/UL — SIGNIFICANT CHANGE UP (ref 3.8–10.5)
WBC # FLD AUTO: 4.72 K/UL — SIGNIFICANT CHANGE UP (ref 3.8–10.5)

## 2019-09-16 PROCEDURE — 99232 SBSQ HOSP IP/OBS MODERATE 35: CPT

## 2019-09-16 RX ADMIN — INSULIN GLARGINE 8 UNIT(S): 100 INJECTION, SOLUTION SUBCUTANEOUS at 22:28

## 2019-09-16 RX ADMIN — Medication 200 MILLIGRAM(S): at 17:48

## 2019-09-16 RX ADMIN — Medication 250 MILLIGRAM(S): at 00:20

## 2019-09-16 RX ADMIN — Medication 250 MILLIGRAM(S): at 12:28

## 2019-09-16 RX ADMIN — Medication 3 UNIT(S): at 12:28

## 2019-09-16 RX ADMIN — Medication 200 MILLIGRAM(S): at 05:38

## 2019-09-16 RX ADMIN — Medication 3 UNIT(S): at 17:48

## 2019-09-16 RX ADMIN — CILOSTAZOL 50 MILLIGRAM(S): 100 TABLET ORAL at 17:47

## 2019-09-16 RX ADMIN — CILOSTAZOL 50 MILLIGRAM(S): 100 TABLET ORAL at 05:38

## 2019-09-16 RX ADMIN — Medication 3 UNIT(S): at 09:20

## 2019-09-16 NOTE — PROGRESS NOTE ADULT - PROBLEM SELECTOR PLAN 1
cont present abx , ID and Vascular f/u   s/p L foot partial 1st ray resection w/ 2nd metatarsal head bone biopsy-   waiting for decision regarding abx course from ID depending upon biopsy results

## 2019-09-16 NOTE — DIETITIAN INITIAL EVALUATION ADULT. - ENERGY NEEDS
Ht: 167.64cm Wt: 9/12/19 68kg BMI: 24.2  IBW: 142 pounds / 64.5kg +/- 10%  Edema: 1+ edema to lt ankle, leg, foot  Pressure Injuries: None

## 2019-09-16 NOTE — DIETITIAN INITIAL EVALUATION ADULT. - PERTINENT MEDS FT
MEDICATIONS  (STANDING):  cilostazol 50 milliGRAM(s) Oral two times a day  ciprofloxacin   IVPB 400 milliGRAM(s) IV Intermittent every 12 hours  dextrose 5%. 1000 milliLiter(s) (50 mL/Hr) IV Continuous <Continuous>  dextrose 50% Injectable 12.5 Gram(s) IV Push once  dextrose 50% Injectable 25 Gram(s) IV Push once  dextrose 50% Injectable 25 Gram(s) IV Push once  ergocalciferol 42006 Unit(s) Oral <User Schedule>  influenza   Vaccine 0.5 milliLiter(s) IntraMuscular once  insulin glargine Injectable (LANTUS) 8 Unit(s) SubCutaneous at bedtime  insulin lispro (HumaLOG) corrective regimen sliding scale   SubCutaneous three times a day before meals  insulin lispro (HumaLOG) corrective regimen sliding scale   SubCutaneous at bedtime  insulin lispro Injectable (HumaLOG) 3 Unit(s) SubCutaneous three times a day before meals  sodium chloride 0.9%. 1000 milliLiter(s) (30 mL/Hr) IV Continuous <Continuous>  vancomycin  IVPB 1000 milliGRAM(s) IV Intermittent every 12 hours    MEDICATIONS  (PRN):  acetaminophen   Tablet .. 650 milliGRAM(s) Oral every 6 hours PRN Mild Pain (1 - 3)  dextrose 40% Gel 15 Gram(s) Oral once PRN Blood Glucose LESS THAN 70 milliGRAM(s)/deciliter  glucagon  Injectable 1 milliGRAM(s) IntraMuscular once PRN Glucose LESS THAN 70 milligrams/deciliter  oxyCODONE    5 mG/acetaminophen 325 mG 1 Tablet(s) Oral every 4 hours PRN Moderate Pain (4 - 6)

## 2019-09-16 NOTE — PROGRESS NOTE ADULT - PROBLEM SELECTOR PLAN 1
See complete consult for full plan of care including HLD target and recommendation   - Glucose improved and now mostly at inpatient target of 100-180  - for now, would monitor on Humalog 3 units TID and Lantus 8 units qhs.  - c/w low correction scale qac and qhs  - continue carb consistent diet  - patient does not have typical phenotype of DM2 (signs of insulin resistance on physical exam, no BMI recorded but appears to have normal BMI on exam, no family history of DM), thus would rule out DAWIT/DM1. F/U HAIM Ab. Islet cell Ab neg. C-peptide 2.0.  - for discharge: likely basal bolus insulin, doses to be determined. Will need to determine how patient can store his insulin at his shelter. final plan to be determined.   He can follow up in the office on discharge - [phone]  [facility] [address] [place] [address] 10/11/19.  He had visit with Dr. Mckinney today which he missed and can be rescheduled.

## 2019-09-16 NOTE — PROGRESS NOTE ADULT - SUBJECTIVE AND OBJECTIVE BOX
Follow Up:      Inverval History/ROS:Patient is a 39y old  Male who presents with a chief complaint of Left foot pain and swelling (15 Sep 2019 12:10)    No fever.   No events      Allergies    No Known Allergies    Intolerances        ANTIMICROBIALS:  ciprofloxacin   IVPB 400 every 12 hours  vancomycin  IVPB 1000 every 12 hours      OTHER MEDS:  acetaminophen   Tablet .. 650 milliGRAM(s) Oral every 6 hours PRN  cilostazol 50 milliGRAM(s) Oral two times a day  dextrose 40% Gel 15 Gram(s) Oral once PRN  dextrose 5%. 1000 milliLiter(s) IV Continuous <Continuous>  dextrose 50% Injectable 12.5 Gram(s) IV Push once  dextrose 50% Injectable 25 Gram(s) IV Push once  dextrose 50% Injectable 25 Gram(s) IV Push once  ergocalciferol 62220 Unit(s) Oral <User Schedule>  glucagon  Injectable 1 milliGRAM(s) IntraMuscular once PRN  influenza   Vaccine 0.5 milliLiter(s) IntraMuscular once  insulin glargine Injectable (LANTUS) 8 Unit(s) SubCutaneous at bedtime  insulin lispro (HumaLOG) corrective regimen sliding scale   SubCutaneous three times a day before meals  insulin lispro (HumaLOG) corrective regimen sliding scale   SubCutaneous at bedtime  insulin lispro Injectable (HumaLOG) 3 Unit(s) SubCutaneous three times a day before meals  oxyCODONE    5 mG/acetaminophen 325 mG 1 Tablet(s) Oral every 4 hours PRN  sodium chloride 0.9%. 1000 milliLiter(s) IV Continuous <Continuous>      Vital Signs Last 24 Hrs  T(C): 36.7 (16 Sep 2019 06:25), Max: 37.1 (15 Sep 2019 22:11)  T(F): 98 (16 Sep 2019 06:25), Max: 98.7 (15 Sep 2019 22:11)  HR: 76 (16 Sep 2019 06:25) (76 - 78)  BP: 134/81 (16 Sep 2019 06:25) (114/69 - 134/81)  BP(mean): --  RR: 18 (16 Sep 2019 06:25) (17 - 18)  SpO2: 100% (16 Sep 2019 06:25) (99% - 100%)    PHYSICAL EXAM:  General: [x ] non-toxic  HEAD/EYES: [ ] PERRL [ ] white sclera [ ] icterus  ENT:  [ ] normal [ ] supple [ ] thrush [ ] pharyngeal exudate  Cardiovascular:   [ ] murmur [x ] normal [ ] PPM/AICD  Respiratory:  [ x] clear to ausculation bilaterally  GI:  x[ ] soft, non-tender, normal bowel sounds  :  [ ] willard [x ] no CVA tenderness   Musculoskeletal:  [ ] no synovitis  Neurologic:  [x ] non-focal exam   Skin:  [x ] no rash  Lymph: [ ] no lymphadenopathy  Psychiatric:  [ ] appropriate affect [x ] alert & oriented  Lines:  [x ] no phlebitis [ ] central line                                8.9    4.72  )-----------( 384      ( 16 Sep 2019 06:50 )             28.9       09-16    137  |  103  |  10  ----------------------------<  205<H>  3.9   |  24  |  1.07    Ca    8.3<L>      16 Sep 2019 06:50    TPro  6.9  /  Alb  2.6<L>  /  TBili  < 0.2<L>  /  DBili  x   /  AST  20  /  ALT  17  /  AlkPhos  50  09-16          MICROBIOLOGY:Culture - Surgical Site:   NO ORGANISMS ISOLATED AT 24 HOURS  NO ORGANISMS ISOLATED AT 48 HRS.  NO ORGANISMS ISOLATED AT 72 HRS. (09-12-19 @ 16:46)  Culture - Surgical Site:   CULTURE IN PROGRESS, FURTHER REPORT TO FOLLOW. (09-12-19 @ 16:45)  Culture - Surgical Site:   PSA^Pseudomonas aeruginosa  STCN^Staphylococcus sp.,coag neg (09-12-19 @ 16:45)      RADIOLOGY:

## 2019-09-16 NOTE — PROGRESS NOTE ADULT - SUBJECTIVE AND OBJECTIVE BOX
Chief Complaint: DM2    History: Tolerating po.  No hyperglycemia.    MEDICATIONS  (STANDING):  cilostazol 50 milliGRAM(s) Oral two times a day  ciprofloxacin   IVPB 400 milliGRAM(s) IV Intermittent every 12 hours  dextrose 5%. 1000 milliLiter(s) (50 mL/Hr) IV Continuous <Continuous>  dextrose 50% Injectable 12.5 Gram(s) IV Push once  dextrose 50% Injectable 25 Gram(s) IV Push once  dextrose 50% Injectable 25 Gram(s) IV Push once  ergocalciferol 45034 Unit(s) Oral <User Schedule>  influenza   Vaccine 0.5 milliLiter(s) IntraMuscular once  insulin glargine Injectable (LANTUS) 8 Unit(s) SubCutaneous at bedtime  insulin lispro (HumaLOG) corrective regimen sliding scale   SubCutaneous three times a day before meals  insulin lispro (HumaLOG) corrective regimen sliding scale   SubCutaneous at bedtime  insulin lispro Injectable (HumaLOG) 3 Unit(s) SubCutaneous three times a day before meals  sodium chloride 0.9%. 1000 milliLiter(s) (30 mL/Hr) IV Continuous <Continuous>  vancomycin  IVPB 1000 milliGRAM(s) IV Intermittent every 12 hours    MEDICATIONS  (PRN):  acetaminophen   Tablet .. 650 milliGRAM(s) Oral every 6 hours PRN Mild Pain (1 - 3)  dextrose 40% Gel 15 Gram(s) Oral once PRN Blood Glucose LESS THAN 70 milliGRAM(s)/deciliter  glucagon  Injectable 1 milliGRAM(s) IntraMuscular once PRN Glucose LESS THAN 70 milligrams/deciliter  oxyCODONE    5 mG/acetaminophen 325 mG 1 Tablet(s) Oral every 4 hours PRN Moderate Pain (4 - 6)      Allergies    No Known Allergies    Intolerances      Review of Systems:    ALL OTHER SYSTEMS REVIEWED AND NEGATIVE        PHYSICAL EXAM:  VITALS: T(C): 36.7 (09-16-19 @ 06:25)  T(F): 98 (09-16-19 @ 06:25), Max: 98.7 (09-15-19 @ 22:11)  HR: 76 (09-16-19 @ 06:25) (76 - 78)  BP: 134/81 (09-16-19 @ 06:25) (114/69 - 134/81)  RR:  (17 - 18)  SpO2:  (99% - 100%)  Wt(kg): --  GENERAL: NAD, well-groomed, well-developed  EYES: No proptosis, no lid lag, anicteric  HEENT:  Atraumatic, Normocephalic, moist mucous membranes  SKIN: L foot in dressing  PSYCH: Alert and oriented x 3, normal affect, normal mood      CAPILLARY BLOOD GLUCOSE      POCT Blood Glucose.: 273 mg/dL (16 Sep 2019 17:22)  POCT Blood Glucose.: 105 mg/dL (16 Sep 2019 12:07)  POCT Blood Glucose.: 141 mg/dL (16 Sep 2019 09:16)  POCT Blood Glucose.: 257 mg/dL (15 Sep 2019 21:25)      09-16    137  |  103  |  10  ----------------------------<  205<H>  3.9   |  24  |  1.07    EGFR if : 102  EGFR if non : 88    Ca    8.3<L>      09-16    TPro  6.9  /  Alb  2.6<L>  /  TBili  < 0.2<L>  /  DBili  x   /  AST  20  /  ALT  17  /  AlkPhos  50  09-16          Thyroid Function Tests:  08-18 @ 07:04 TSH 0.45 FreeT4 -- T3 -- Anti TPO -- Anti Thyroglobulin Ab -- TSI --      Hemoglobin A1C, Whole Blood: 11.9 % <H> [4.0 - 5.6] (09-10-19 @ 05:42)  Hemoglobin A1C, Whole Blood: 14.1 % <H> [4.0 - 5.6] (08-18-19 @ 12:00)  Hemoglobin A1C, Whole Blood: 14.0 % <H> [4.0 - 5.6] (08-17-19 @ 11:20)

## 2019-09-16 NOTE — PROGRESS NOTE ADULT - ASSESSMENT
39 year old with dm and foot ulcer, prior diagnosis of Om of foot, presents with fever and foot pain with non-healing wound      1) Foot ulcer  prior cultures with citrobacter and acinitobacter  Cx now with proteus and pseudomonas    Or culture with pseudomonas.    Continue Cipro/ vanco    2) Osteomyelitis of foot    Follow up OR cultures  Continue current abx    High concern for residual OM  High risk for failure with antibiotic treatment    Curative treatment involved amputation of toe.    If pt refuses or not amenable with goals, can try salvage with vancomycin and cipro for 6 weeks     Weekly cbc, bmp, vanco through fax to [phone]    High risk for treatment failure with abx    3) DM: glycemic control key to wound healing    4) Therapeutic drug monitoring  vanco through okay      Call the ID service with questions   Follow up as outpt      [phone]

## 2019-09-16 NOTE — PROGRESS NOTE ADULT - SUBJECTIVE AND OBJECTIVE BOX
SUBJECTIVE / OVERNIGHT EVENTS: pt denies chest pain, shortness of breath     MEDICATIONS  (STANDING):  cilostazol 50 milliGRAM(s) Oral two times a day  ciprofloxacin   IVPB 400 milliGRAM(s) IV Intermittent every 12 hours  dextrose 5%. 1000 milliLiter(s) (50 mL/Hr) IV Continuous <Continuous>  dextrose 50% Injectable 12.5 Gram(s) IV Push once  dextrose 50% Injectable 25 Gram(s) IV Push once  dextrose 50% Injectable 25 Gram(s) IV Push once  ergocalciferol 86482 Unit(s) Oral <User Schedule>  influenza   Vaccine 0.5 milliLiter(s) IntraMuscular once  insulin glargine Injectable (LANTUS) 8 Unit(s) SubCutaneous at bedtime  insulin lispro (HumaLOG) corrective regimen sliding scale   SubCutaneous three times a day before meals  insulin lispro (HumaLOG) corrective regimen sliding scale   SubCutaneous at bedtime  insulin lispro Injectable (HumaLOG) 3 Unit(s) SubCutaneous three times a day before meals  sodium chloride 0.9%. 1000 milliLiter(s) (30 mL/Hr) IV Continuous <Continuous>  vancomycin  IVPB 1000 milliGRAM(s) IV Intermittent every 12 hours    MEDICATIONS  (PRN):  acetaminophen   Tablet .. 650 milliGRAM(s) Oral every 6 hours PRN Mild Pain (1 - 3)  dextrose 40% Gel 15 Gram(s) Oral once PRN Blood Glucose LESS THAN 70 milliGRAM(s)/deciliter  glucagon  Injectable 1 milliGRAM(s) IntraMuscular once PRN Glucose LESS THAN 70 milligrams/deciliter  oxyCODONE    5 mG/acetaminophen 325 mG 1 Tablet(s) Oral every 4 hours PRN Moderate Pain (4 - 6)    Vital Signs Last 24 Hrs  T(C): 36.7 (16 Sep 2019 06:25), Max: 37.1 (15 Sep 2019 22:11)  T(F): 98 (16 Sep 2019 06:25), Max: 98.7 (15 Sep 2019 22:11)  HR: 76 (16 Sep 2019 06:25) (76 - 78)  BP: 134/81 (16 Sep 2019 06:25) (114/69 - 134/81)  BP(mean): --  RR: 18 (16 Sep 2019 06:25) (17 - 18)  SpO2: 100% (16 Sep 2019 06:25) (99% - 100%)    Constitutional: No fever, fatigu  Skin: No rash.  Eyes: No recent vision problems or eye pain.  ENT: No congestion, ear pain, or sore throat.  Cardiovascular: No chest pain or palpation.  Respiratory: No cough, shortness of breath, congestion, or wheezing.  Gastrointestinal: No abdominal pain, nausea, vomiting, or diarrhea.  Genitourinary: No dysuria.  Musculoskeletal: No joint swelling.  Neurologic: No headache.    PHYSICAL EXAM:  GENERAL: NAD  EYES: EOMI, PERRLA  NECK: Supple, No JVD  CHEST/LUNG: cta charan   HEART:  S1 , S2 +  ABDOMEN: soft , bs+  EXTREMITIES: left  foot dressing+  NEUROLOGY:alert awake oriented     LABS:  09-16    137  |  103  |  10  ----------------------------<  205<H>  3.9   |  24  |  1.07    Ca    8.3<L>      16 Sep 2019 06:50    TPro  6.9  /  Alb  2.6<L>  /  TBili  < 0.2<L>  /  DBili      /  AST  20  /  ALT  17  /  AlkPhos  50  09-16    Creatinine Trend: 1.07 <--, 1.29 <--, 1.15 <--, 1.32 <--, 1.10 <--, 1.12 <--                        8.9    4.72  )-----------( 384      ( 16 Sep 2019 06:50 )             28.9     Urine Studies:            LIVER FUNCTIONS - ( 16 Sep 2019 06:50 )  Alb: 2.6 g/dL / Pro: 6.9 g/dL / ALK PHOS: 50 u/L / ALT: 17 u/L / AST: 20 u/L / GGT: x

## 2019-09-16 NOTE — DIETITIAN INITIAL EVALUATION ADULT. - ADD RECOMMEND
1) Monitor weights, PO intake/diet tolerance, skin integrity, pertinent labs. 2) RD to remain available for ongoing nutrition education PRN during inpatient stay.  3) Honor food preferences as able. 4) Encourage compliance with consistent CHO diet.

## 2019-09-16 NOTE — DIETITIAN INITIAL EVALUATION ADULT. - OTHER INFO
RD visited with patient for LOS. Patient admitted for osteomyelitis of lt foot, partial amputation of first ray of lt foot on 9/12/19.  Patient noted with h/o vitamin D deficiency, HLD, HTN, uncontrolled DM.  Hba1c 11.9%, corresponding to mean plasma glucose of ~345mg/dL.  Education regarding consistent CHO diet provided - verbal and written education provided.  Reviewed CHO containing foods and appropriate serving sizes.  Patient stated he lives in a homeless shelter/hotel and does not always have access to the healthiest foods, but verbalized he is trying to be more mindful.  Advised importance of consuming regular meals and not to skip meals. Overall, patient reports a good appetite at present and at baseline.  No reported food allergies. Denies chewing/swallowing difficulties.  Endocrinology notes reviewed - recommendations noted and patient was referred to follow-up with diabetes educator, which he will likely benefit from for reinforcement and ongoing education for nutritional strategies to improve glycemic control.  Patient reported usual body weight of 150 pounds.  Weights on 9/12/19 63/68kgs; weight of 63kg likely in error as appearance more consistent with ~68kgs.

## 2019-09-16 NOTE — DIETITIAN INITIAL EVALUATION ADULT. - PROBLEM SELECTOR PLAN 3
- DVT: SCDs, low Improve score  - Diet: Consistent carbohydrate    Tori Constantino MD, PGY-2  [id number]

## 2019-09-16 NOTE — DIETITIAN INITIAL EVALUATION ADULT. - PROBLEM/PLAN-1
DISPLAY PLAN FREE TEXT Split-Thickness Skin Graft Text: The defect edges were debeveled with a #15 scalpel blade.  Given the location of the defect, shape of the defect and the proximity to free margins a split thickness skin graft was deemed most appropriate.  Using a sterile surgical marker, the primary defect shape was transferred to the donor site. The split thickness graft was then harvested.  The skin graft was then placed in the primary defect and oriented appropriately.

## 2019-09-16 NOTE — PROVIDER CONTACT NOTE (OTHER) - ACTION/TREATMENT ORDERED:
No vanco trough needed at this time, pt is therapuetic. will order random vanco to be drawn for tomorrow

## 2019-09-16 NOTE — DIETITIAN INITIAL EVALUATION ADULT. - PERTINENT LABORATORY DATA
09-16 Na137 mmol/L Glu 205 mg/dL<H> K+ 3.9 mmol/L Cr  1.07 mg/dL BUN 10 mg/dL 09-12 Phos 3.1 mg/dL 09-16 Alb 2.6 g/dL<L> 08-18 PAB 7 mg/dL<L> 09-10 [id number] 11.9 %<H>  _________________________________  CAPILLARY BLOOD GLUCOSE  POCT Blood Glucose.: 105 mg/dL (16 Sep 2019 12:07)  POCT Blood Glucose.: 141 mg/dL (16 Sep 2019 09:16)  POCT Blood Glucose.: 257 mg/dL (15 Sep 2019 21:25)  POCT Blood Glucose.: 195 mg/dL (15 Sep 2019 16  _________________________________

## 2019-09-17 LAB
GLUCOSE BLDC GLUCOMTR-MCNC: 168 MG/DL — HIGH (ref 70–99)
GLUCOSE BLDC GLUCOMTR-MCNC: 174 MG/DL — HIGH (ref 70–99)
GLUCOSE BLDC GLUCOMTR-MCNC: 206 MG/DL — HIGH (ref 70–99)
GLUCOSE BLDC GLUCOMTR-MCNC: 92 MG/DL — SIGNIFICANT CHANGE UP (ref 70–99)
VANCOMYCIN TROUGH SERPL-MCNC: 17.4 UG/ML — SIGNIFICANT CHANGE UP (ref 10–20)

## 2019-09-17 RX ADMIN — Medication 1: at 09:05

## 2019-09-17 RX ADMIN — Medication 3 UNIT(S): at 17:53

## 2019-09-17 RX ADMIN — Medication 200 MILLIGRAM(S): at 17:57

## 2019-09-17 RX ADMIN — Medication 250 MILLIGRAM(S): at 01:11

## 2019-09-17 RX ADMIN — Medication 2: at 17:53

## 2019-09-17 RX ADMIN — Medication 250 MILLIGRAM(S): at 12:27

## 2019-09-17 RX ADMIN — INSULIN GLARGINE 8 UNIT(S): 100 INJECTION, SOLUTION SUBCUTANEOUS at 22:07

## 2019-09-17 RX ADMIN — Medication 200 MILLIGRAM(S): at 05:31

## 2019-09-17 RX ADMIN — SODIUM CHLORIDE 30 MILLILITER(S): 9 INJECTION INTRAMUSCULAR; INTRAVENOUS; SUBCUTANEOUS at 05:41

## 2019-09-17 RX ADMIN — Medication 3 UNIT(S): at 12:25

## 2019-09-17 RX ADMIN — Medication 3 UNIT(S): at 09:06

## 2019-09-17 RX ADMIN — CILOSTAZOL 50 MILLIGRAM(S): 100 TABLET ORAL at 05:32

## 2019-09-17 RX ADMIN — CILOSTAZOL 50 MILLIGRAM(S): 100 TABLET ORAL at 17:53

## 2019-09-17 RX ADMIN — SODIUM CHLORIDE 30 MILLILITER(S): 9 INJECTION INTRAMUSCULAR; INTRAVENOUS; SUBCUTANEOUS at 09:06

## 2019-09-17 NOTE — PROGRESS NOTE ADULT - ASSESSMENT
39M s/p L foot partial 1st ray resection w/ 2nd metatarsal head bone biopsy- closed (DOS 9/12/19)  - Pt seen and evaluated, POD #5  - Afebrile, no leukocytosis  - LF surgical site sutures intact, no dehiscence, no hematoma, scant sanguinous drainage, no acute signs of infection, maceration to medial border of incision   - dressed surgical site w/  DSD  - high concern for residual infection- awaiting final OR path/ Cx sensitives prior to d/c; cultures currently growing pseudomonas and coag neg staph  - cont IV abx per ID, appreciate recs  - ordered crutches- to be used for 2 weeks to limit weightbearing to sx site   - d/w with attending

## 2019-09-17 NOTE — PROGRESS NOTE ADULT - SUBJECTIVE AND OBJECTIVE BOX
SUBJECTIVE / OVERNIGHT EVENTS: pt denies chest pain, shortness of breath     MEDICATIONS  (STANDING):  cilostazol 50 milliGRAM(s) Oral two times a day  ciprofloxacin   IVPB 400 milliGRAM(s) IV Intermittent every 12 hours  dextrose 5%. 1000 milliLiter(s) (50 mL/Hr) IV Continuous <Continuous>  dextrose 50% Injectable 12.5 Gram(s) IV Push once  dextrose 50% Injectable 25 Gram(s) IV Push once  dextrose 50% Injectable 25 Gram(s) IV Push once  ergocalciferol 79428 Unit(s) Oral <User Schedule>  influenza   Vaccine 0.5 milliLiter(s) IntraMuscular once  insulin glargine Injectable (LANTUS) 8 Unit(s) SubCutaneous at bedtime  insulin lispro (HumaLOG) corrective regimen sliding scale   SubCutaneous three times a day before meals  insulin lispro (HumaLOG) corrective regimen sliding scale   SubCutaneous at bedtime  insulin lispro Injectable (HumaLOG) 3 Unit(s) SubCutaneous three times a day before meals  sodium chloride 0.9%. 1000 milliLiter(s) (30 mL/Hr) IV Continuous <Continuous>  vancomycin  IVPB 1000 milliGRAM(s) IV Intermittent every 12 hours    MEDICATIONS  (PRN):  acetaminophen   Tablet .. 650 milliGRAM(s) Oral every 6 hours PRN Mild Pain (1 - 3)  dextrose 40% Gel 15 Gram(s) Oral once PRN Blood Glucose LESS THAN 70 milliGRAM(s)/deciliter  glucagon  Injectable 1 milliGRAM(s) IntraMuscular once PRN Glucose LESS THAN 70 milligrams/deciliter  oxyCODONE    5 mG/acetaminophen 325 mG 1 Tablet(s) Oral every 4 hours PRN Moderate Pain (4 - 6)    Vital Signs Last 24 Hrs  T(C): 37.1 (17 Sep 2019 15:12), Max: 37.1 (17 Sep 2019 15:12)  T(F): 98.7 (17 Sep 2019 15:12), Max: 98.7 (17 Sep 2019 15:12)  HR: 79 (17 Sep 2019 15:12) (74 - 80)  BP: 113/58 (17 Sep 2019 15:12) (100/57 - 113/58)  BP(mean): --  RR: 17 (17 Sep 2019 15:12) (16 - 18)  SpO2: 98% (17 Sep 2019 15:12) (98% - 99%)    Constitutional: No fever, fatigu  Skin: No rash.  Eyes: No recent vision problems or eye pain.  ENT: No congestion, ear pain, or sore throat.  Cardiovascular: No chest pain or palpation.  Respiratory: No cough, shortness of breath, congestion, or wheezing.  Gastrointestinal: No abdominal pain, nausea, vomiting, or diarrhea.  Genitourinary: No dysuria.  Musculoskeletal: No joint swelling.  Neurologic: No headache.    PHYSICAL EXAM:  GENERAL: NAD  EYES: EOMI, PERRLA  NECK: Supple, No JVD  CHEST/LUNG: cta charan   HEART:  S1 , S2 +  ABDOMEN: soft , bs+  EXTREMITIES: left  foot dressing+  NEUROLOGY:alert awake oriented     LABS:  09-16    137  |  103  |  10  ----------------------------<  205<H>  3.9   |  24  |  1.07    Ca    8.3<L>      16 Sep 2019 06:50    TPro  6.9  /  Alb  2.6<L>  /  TBili  < 0.2<L>  /  DBili      /  AST  20  /  ALT  17  /  AlkPhos  50  09-16    Creatinine Trend: 1.07 <--, 1.29 <--, 1.15 <--, 1.32 <--, 1.10 <--                        8.9    4.72  )-----------( 384      ( 16 Sep 2019 06:50 )             28.9     Urine Studies:            LIVER FUNCTIONS - ( 16 Sep 2019 06:50 )  Alb: 2.6 g/dL / Pro: 6.9 g/dL / ALK PHOS: 50 u/L / ALT: 17 u/L / AST: 20 u/L / GGT: x                 LIVER FUNCTIONS - ( 16 Sep 2019 06:50 )  Alb: 2.6 g/dL / Pro: 6.9 g/dL / ALK PHOS: 50 u/L / ALT: 17 u/L / AST: 20 u/L / GGT: x

## 2019-09-17 NOTE — PROGRESS NOTE ADULT - SUBJECTIVE AND OBJECTIVE BOX
Podiatry pager #: [phone] ([place])/ [phone] ([facility])    Patient is a 39y old  Male who presents with a chief complaint of Left foot pain and swelling (16 Sep 2019 18:33)       INTERVAL HPI/OVERNIGHT EVENTS:  Patient seen and evaluated at bedside.  Pt is resting comfortable in NAD. Denies N/V/F/C.     Allergies    No Known Allergies    Intolerances        Vital Signs Last 24 Hrs  T(C): 36.7 (17 Sep 2019 05:30), Max: 36.9 (16 Sep 2019 22:29)  T(F): 98 (17 Sep 2019 05:30), Max: 98.4 (16 Sep 2019 22:29)  HR: 74 (17 Sep 2019 05:30) (74 - 80)  BP: 104/60 (17 Sep 2019 05:30) (100/57 - 104/60)  BP(mean): --  RR: 16 (17 Sep 2019 05:30) (16 - 18)  SpO2: 99% (17 Sep 2019 05:30) (99% - 99%)    LABS:                        8.9    4.72  )-----------( 384      ( 16 Sep 2019 06:50 )             28.9     09-16    137  |  103  |  10  ----------------------------<  205<H>  3.9   |  24  |  1.07    Ca    8.3<L>      16 Sep 2019 06:50    TPro  6.9  /  Alb  2.6<L>  /  TBili  < 0.2<L>  /  DBili  x   /  AST  20  /  ALT  17  /  AlkPhos  50  09-16        CAPILLARY BLOOD GLUCOSE      POCT Blood Glucose.: 181 mg/dL (16 Sep 2019 22:12)  POCT Blood Glucose.: 273 mg/dL (16 Sep 2019 17:22)  POCT Blood Glucose.: 105 mg/dL (16 Sep 2019 12:07)  POCT Blood Glucose.: 141 mg/dL (16 Sep 2019 09:16)      Lower Extremity Physical Exam:  Vascular: DP/PT 2/4, B/L, CFT <3 seconds B/L, Temperature gradient warm to warm B/L.   Neuro: Epicritic sensation absent to the level of midfoot, B/L.  Musculoskeletal/Ortho: s/p LF partial fourth ray resection  Derm: s/p L foot partial 1st ray resection w/ 2nd metatarsal head bone biopsy- closed (DOS 9/12/19)  POD#3- surgical site sutures intact, no hematoma, scant sanguinous drainage, no dehiscence, no acute signs of infection, maceration to medial incision-likely from weight bearing   RADIOLOGY & ADDITIONAL TESTS: Podiatry pager #: [phone] ([place])/ [phone] ([facility])    Patient is a 39y old  Male who presents with a chief complaint of Left foot pain and swelling (16 Sep 2019 18:33)       INTERVAL HPI/OVERNIGHT EVENTS:  Patient seen and evaluated at bedside.  Pt is resting comfortable in NAD. Denies N/V/F/C.     Allergies    No Known Allergies    Intolerances        Vital Signs Last 24 Hrs  T(C): 36.7 (17 Sep 2019 05:30), Max: 36.9 (16 Sep 2019 22:29)  T(F): 98 (17 Sep 2019 05:30), Max: 98.4 (16 Sep 2019 22:29)  HR: 74 (17 Sep 2019 05:30) (74 - 80)  BP: 104/60 (17 Sep 2019 05:30) (100/57 - 104/60)  BP(mean): --  RR: 16 (17 Sep 2019 05:30) (16 - 18)  SpO2: 99% (17 Sep 2019 05:30) (99% - 99%)    LABS:                        8.9    4.72  )-----------( 384      ( 16 Sep 2019 06:50 )             28.9     09-16    137  |  103  |  10  ----------------------------<  205<H>  3.9   |  24  |  1.07    Ca    8.3<L>      16 Sep 2019 06:50    TPro  6.9  /  Alb  2.6<L>  /  TBili  < 0.2<L>  /  DBili  x   /  AST  20  /  ALT  17  /  AlkPhos  50  09-16        CAPILLARY BLOOD GLUCOSE      POCT Blood Glucose.: 181 mg/dL (16 Sep 2019 22:12)  POCT Blood Glucose.: 273 mg/dL (16 Sep 2019 17:22)  POCT Blood Glucose.: 105 mg/dL (16 Sep 2019 12:07)  POCT Blood Glucose.: 141 mg/dL (16 Sep 2019 09:16)      Lower Extremity Physical Exam:  Vascular: DP/PT 2/4, B/L, CFT <3 seconds B/L, Temperature gradient warm to warm B/L.   Neuro: Epicritic sensation absent to the level of midfoot, B/L.  Musculoskeletal/Ortho: s/p LF partial fourth ray resection  Derm: s/p L foot partial 1st ray resection w/ 2nd metatarsal head bone biopsy- closed (DOS 9/12/19)  POD#5- surgical site sutures intact, no hematoma, scant sanguinous drainage, no dehiscence, no acute signs of infection, maceration to medial incision-likely from weight bearing   RADIOLOGY & ADDITIONAL TESTS:

## 2019-09-18 LAB
ANION GAP SERPL CALC-SCNC: 9 MMO/L — SIGNIFICANT CHANGE UP (ref 7–14)
BUN SERPL-MCNC: 14 MG/DL — SIGNIFICANT CHANGE UP (ref 7–23)
CALCIUM SERPL-MCNC: 8.2 MG/DL — LOW (ref 8.4–10.5)
CHLORIDE SERPL-SCNC: 104 MMOL/L — SIGNIFICANT CHANGE UP (ref 98–107)
CO2 SERPL-SCNC: 25 MMOL/L — SIGNIFICANT CHANGE UP (ref 22–31)
CREAT SERPL-MCNC: 1.15 MG/DL — SIGNIFICANT CHANGE UP (ref 0.5–1.3)
CULTURE - SURGICAL SITE: SIGNIFICANT CHANGE UP
GLUCOSE BLDC GLUCOMTR-MCNC: 159 MG/DL — HIGH (ref 70–99)
GLUCOSE BLDC GLUCOMTR-MCNC: 170 MG/DL — HIGH (ref 70–99)
GLUCOSE BLDC GLUCOMTR-MCNC: 172 MG/DL — HIGH (ref 70–99)
GLUCOSE BLDC GLUCOMTR-MCNC: 190 MG/DL — HIGH (ref 70–99)
GLUCOSE SERPL-MCNC: 185 MG/DL — HIGH (ref 70–99)
HCT VFR BLD CALC: 27.4 % — LOW (ref 39–50)
HGB BLD-MCNC: 8.4 G/DL — LOW (ref 13–17)
MAGNESIUM SERPL-MCNC: 1.7 MG/DL — SIGNIFICANT CHANGE UP (ref 1.6–2.6)
MCHC RBC-ENTMCNC: 26.8 PG — LOW (ref 27–34)
MCHC RBC-ENTMCNC: 30.7 % — LOW (ref 32–36)
MCV RBC AUTO: 87.5 FL — SIGNIFICANT CHANGE UP (ref 80–100)
NRBC # FLD: 0 K/UL — SIGNIFICANT CHANGE UP (ref 0–0)
PHOSPHATE SERPL-MCNC: 3.1 MG/DL — SIGNIFICANT CHANGE UP (ref 2.5–4.5)
PLATELET # BLD AUTO: 390 K/UL — SIGNIFICANT CHANGE UP (ref 150–400)
PMV BLD: 9.1 FL — SIGNIFICANT CHANGE UP (ref 7–13)
POTASSIUM SERPL-MCNC: 4 MMOL/L — SIGNIFICANT CHANGE UP (ref 3.5–5.3)
POTASSIUM SERPL-SCNC: 4 MMOL/L — SIGNIFICANT CHANGE UP (ref 3.5–5.3)
RBC # BLD: 3.13 M/UL — LOW (ref 4.2–5.8)
RBC # FLD: 12.6 % — SIGNIFICANT CHANGE UP (ref 10.3–14.5)
SODIUM SERPL-SCNC: 138 MMOL/L — SIGNIFICANT CHANGE UP (ref 135–145)
VANCOMYCIN TROUGH SERPL-MCNC: 17.5 UG/ML — SIGNIFICANT CHANGE UP (ref 10–20)
WBC # BLD: 5.78 K/UL — SIGNIFICANT CHANGE UP (ref 3.8–10.5)
WBC # FLD AUTO: 5.78 K/UL — SIGNIFICANT CHANGE UP (ref 3.8–10.5)

## 2019-09-18 RX ORDER — OXYCODONE AND ACETAMINOPHEN 5; 325 MG/1; MG/1
1 TABLET ORAL EVERY 4 HOURS
Refills: 0 | Status: DISCONTINUED | OUTPATIENT
Start: 2019-09-18 | End: 2019-09-19

## 2019-09-18 RX ADMIN — Medication 250 MILLIGRAM(S): at 11:42

## 2019-09-18 RX ADMIN — ERGOCALCIFEROL 50000 UNIT(S): 1.25 CAPSULE ORAL at 11:42

## 2019-09-18 RX ADMIN — Medication 200 MILLIGRAM(S): at 17:57

## 2019-09-18 RX ADMIN — Medication 1: at 12:37

## 2019-09-18 RX ADMIN — Medication 1: at 17:56

## 2019-09-18 RX ADMIN — Medication 3 UNIT(S): at 08:42

## 2019-09-18 RX ADMIN — CILOSTAZOL 50 MILLIGRAM(S): 100 TABLET ORAL at 17:57

## 2019-09-18 RX ADMIN — CILOSTAZOL 50 MILLIGRAM(S): 100 TABLET ORAL at 06:30

## 2019-09-18 RX ADMIN — SODIUM CHLORIDE 30 MILLILITER(S): 9 INJECTION INTRAMUSCULAR; INTRAVENOUS; SUBCUTANEOUS at 08:43

## 2019-09-18 RX ADMIN — Medication 1: at 08:42

## 2019-09-18 RX ADMIN — SODIUM CHLORIDE 30 MILLILITER(S): 9 INJECTION INTRAMUSCULAR; INTRAVENOUS; SUBCUTANEOUS at 21:54

## 2019-09-18 RX ADMIN — Medication 3 UNIT(S): at 17:56

## 2019-09-18 RX ADMIN — Medication 250 MILLIGRAM(S): at 00:15

## 2019-09-18 RX ADMIN — INSULIN GLARGINE 8 UNIT(S): 100 INJECTION, SOLUTION SUBCUTANEOUS at 22:01

## 2019-09-18 RX ADMIN — Medication 200 MILLIGRAM(S): at 06:29

## 2019-09-18 RX ADMIN — Medication 3 UNIT(S): at 12:37

## 2019-09-18 NOTE — PROGRESS NOTE ADULT - SUBJECTIVE AND OBJECTIVE BOX
SUBJECTIVE / OVERNIGHT EVENTS: pt denies chest pain, shortness of breath     MEDICATIONS  (STANDING):  cilostazol 50 milliGRAM(s) Oral two times a day  ciprofloxacin   IVPB 400 milliGRAM(s) IV Intermittent every 12 hours  dextrose 5%. 1000 milliLiter(s) (50 mL/Hr) IV Continuous <Continuous>  dextrose 50% Injectable 12.5 Gram(s) IV Push once  dextrose 50% Injectable 25 Gram(s) IV Push once  dextrose 50% Injectable 25 Gram(s) IV Push once  ergocalciferol 71210 Unit(s) Oral <User Schedule>  influenza   Vaccine 0.5 milliLiter(s) IntraMuscular once  insulin glargine Injectable (LANTUS) 8 Unit(s) SubCutaneous at bedtime  insulin lispro (HumaLOG) corrective regimen sliding scale   SubCutaneous three times a day before meals  insulin lispro (HumaLOG) corrective regimen sliding scale   SubCutaneous at bedtime  insulin lispro Injectable (HumaLOG) 3 Unit(s) SubCutaneous three times a day before meals  sodium chloride 0.9%. 1000 milliLiter(s) (30 mL/Hr) IV Continuous <Continuous>  vancomycin  IVPB 1000 milliGRAM(s) IV Intermittent every 12 hours    MEDICATIONS  (PRN):  acetaminophen   Tablet .. 650 milliGRAM(s) Oral every 6 hours PRN Mild Pain (1 - 3)  dextrose 40% Gel 15 Gram(s) Oral once PRN Blood Glucose LESS THAN 70 milliGRAM(s)/deciliter  glucagon  Injectable 1 milliGRAM(s) IntraMuscular once PRN Glucose LESS THAN 70 milligrams/deciliter  oxyCODONE    5 mG/acetaminophen 325 mG 1 Tablet(s) Oral every 4 hours PRN Moderate Pain (4 - 6)    Vital Signs Last 24 Hrs  T(C): 36.9 (09-18-19 @ 21:53), Max: 36.9 (09-18-19 @ 21:53)  T(F): 98.5 (09-18-19 @ 21:53), Max: 98.5 (09-18-19 @ 21:53)  HR: 85 (09-18-19 @ 21:53) (77 - 85)  BP: 114/54 (09-18-19 @ 21:53) (103/52 - 114/54)  RR: 16 (09-18-19 @ 21:53) (16 - 16)  SpO2: 99% (09-18-19 @ 21:53) (99% - 100%)  Wt(kg): --    Constitutional: No fever, fatigu  Skin: No rash.  Eyes: No recent vision problems or eye pain.  ENT: No congestion, ear pain, or sore throat.  Cardiovascular: No chest pain or palpation.  Respiratory: No cough, shortness of breath, congestion, or wheezing.  Gastrointestinal: No abdominal pain, nausea, vomiting, or diarrhea.  Genitourinary: No dysuria.  Musculoskeletal: No joint swelling.  Neurologic: No headache.    PHYSICAL EXAM:  GENERAL: NAD  EYES: EOMI, PERRLA  NECK: Supple, No JVD  CHEST/LUNG: cta charan   HEART:  S1 , S2 +  ABDOMEN: soft , bs+  EXTREMITIES: left  foot dressing+  NEUROLOGY:alert awake oriented     LABS:  09-18    138  |  104  |  14  ----------------------------<  185<H>  4.0   |  25  |  1.15    Ca    8.2<L>      18 Sep 2019 05:30  Phos  3.1     09-18  Mg     1.7     09-18      Creatinine Trend: 1.15 <--, 1.07 <--, 1.29 <--, 1.15 <--, 1.32 <--                        8.4    5.78  )-----------( 390      ( 18 Sep 2019 05:30 )             27.4     Urine Studies:                  Alb: 2.6 g/dL / Pro: 6.9 g/dL / ALK PHOS: 50 u/L / ALT: 17 u/L / AST: 20 u/L / GGT: x                 LIVER FUNCTIONS - ( 16 Sep 2019 06:50 )  Alb: 2.6 g/dL / Pro: 6.9 g/dL / ALK PHOS: 50 u/L / ALT: 17 u/L / AST: 20 u/L / GGT: x

## 2019-09-19 LAB
ANION GAP SERPL CALC-SCNC: 12 MMO/L — SIGNIFICANT CHANGE UP (ref 7–14)
BUN SERPL-MCNC: 12 MG/DL — SIGNIFICANT CHANGE UP (ref 7–23)
CALCIUM SERPL-MCNC: 8.7 MG/DL — SIGNIFICANT CHANGE UP (ref 8.4–10.5)
CHLORIDE SERPL-SCNC: 101 MMOL/L — SIGNIFICANT CHANGE UP (ref 98–107)
CO2 SERPL-SCNC: 26 MMOL/L — SIGNIFICANT CHANGE UP (ref 22–31)
CREAT SERPL-MCNC: 1.15 MG/DL — SIGNIFICANT CHANGE UP (ref 0.5–1.3)
GLUCOSE BLDC GLUCOMTR-MCNC: 131 MG/DL — HIGH (ref 70–99)
GLUCOSE BLDC GLUCOMTR-MCNC: 147 MG/DL — HIGH (ref 70–99)
GLUCOSE BLDC GLUCOMTR-MCNC: 181 MG/DL — HIGH (ref 70–99)
GLUCOSE BLDC GLUCOMTR-MCNC: 220 MG/DL — HIGH (ref 70–99)
GLUCOSE SERPL-MCNC: 169 MG/DL — HIGH (ref 70–99)
HCT VFR BLD CALC: 26.8 % — LOW (ref 39–50)
HGB BLD-MCNC: 8.4 G/DL — LOW (ref 13–17)
MCHC RBC-ENTMCNC: 27 PG — SIGNIFICANT CHANGE UP (ref 27–34)
MCHC RBC-ENTMCNC: 31.3 % — LOW (ref 32–36)
MCV RBC AUTO: 86.2 FL — SIGNIFICANT CHANGE UP (ref 80–100)
NRBC # FLD: 0 K/UL — SIGNIFICANT CHANGE UP (ref 0–0)
PLATELET # BLD AUTO: 413 K/UL — HIGH (ref 150–400)
PMV BLD: 9.1 FL — SIGNIFICANT CHANGE UP (ref 7–13)
POTASSIUM SERPL-MCNC: 3.7 MMOL/L — SIGNIFICANT CHANGE UP (ref 3.5–5.3)
POTASSIUM SERPL-SCNC: 3.7 MMOL/L — SIGNIFICANT CHANGE UP (ref 3.5–5.3)
RBC # BLD: 3.11 M/UL — LOW (ref 4.2–5.8)
RBC # FLD: 12.8 % — SIGNIFICANT CHANGE UP (ref 10.3–14.5)
SODIUM SERPL-SCNC: 139 MMOL/L — SIGNIFICANT CHANGE UP (ref 135–145)
SURGICAL PATHOLOGY STUDY: SIGNIFICANT CHANGE UP
WBC # BLD: 6.75 K/UL — SIGNIFICANT CHANGE UP (ref 3.8–10.5)
WBC # FLD AUTO: 6.75 K/UL — SIGNIFICANT CHANGE UP (ref 3.8–10.5)

## 2019-09-19 RX ORDER — OXYCODONE AND ACETAMINOPHEN 5; 325 MG/1; MG/1
1 TABLET ORAL EVERY 4 HOURS
Refills: 0 | Status: DISCONTINUED | OUTPATIENT
Start: 2019-09-19 | End: 2019-09-23

## 2019-09-19 RX ADMIN — Medication 200 MILLIGRAM(S): at 05:53

## 2019-09-19 RX ADMIN — CILOSTAZOL 50 MILLIGRAM(S): 100 TABLET ORAL at 05:53

## 2019-09-19 RX ADMIN — INSULIN GLARGINE 8 UNIT(S): 100 INJECTION, SOLUTION SUBCUTANEOUS at 22:26

## 2019-09-19 RX ADMIN — CILOSTAZOL 50 MILLIGRAM(S): 100 TABLET ORAL at 17:11

## 2019-09-19 RX ADMIN — Medication 250 MILLIGRAM(S): at 23:57

## 2019-09-19 RX ADMIN — Medication 250 MILLIGRAM(S): at 11:26

## 2019-09-19 RX ADMIN — Medication 1: at 17:13

## 2019-09-19 RX ADMIN — Medication 3 UNIT(S): at 17:12

## 2019-09-19 RX ADMIN — Medication 250 MILLIGRAM(S): at 00:45

## 2019-09-19 RX ADMIN — Medication 3 UNIT(S): at 12:32

## 2019-09-19 RX ADMIN — Medication 200 MILLIGRAM(S): at 17:11

## 2019-09-19 RX ADMIN — Medication 3 UNIT(S): at 08:44

## 2019-09-19 NOTE — PROVIDER CONTACT NOTE (OTHER) - ASSESSMENT
Right arm swollen and painful to touch near old IV site near AC. Patient denies numbness or tingling, right radial pulse +2 upon palpation, no redness or s/s of infection noted. patient's left arm IV site dry, intact, patent, flushes without difficulty, no pain noted, and IV fluids running as ordered.

## 2019-09-19 NOTE — PROVIDER CONTACT NOTE (OTHER) - BACKGROUND
patient admitted with acute osteomyelitis of left foot, patient has IV fluids running as ordered through new left arm IV

## 2019-09-19 NOTE — PROGRESS NOTE ADULT - SUBJECTIVE AND OBJECTIVE BOX
SUBJECTIVE / OVERNIGHT EVENTS: pt denies chest pain, shortness of breath     MEDICATIONS  (STANDING):  cilostazol 50 milliGRAM(s) Oral two times a day  ciprofloxacin   IVPB 400 milliGRAM(s) IV Intermittent every 12 hours  dextrose 5%. 1000 milliLiter(s) (50 mL/Hr) IV Continuous <Continuous>  dextrose 50% Injectable 12.5 Gram(s) IV Push once  dextrose 50% Injectable 25 Gram(s) IV Push once  dextrose 50% Injectable 25 Gram(s) IV Push once  ergocalciferol 82393 Unit(s) Oral <User Schedule>  influenza   Vaccine 0.5 milliLiter(s) IntraMuscular once  insulin glargine Injectable (LANTUS) 8 Unit(s) SubCutaneous at bedtime  insulin lispro (HumaLOG) corrective regimen sliding scale   SubCutaneous three times a day before meals  insulin lispro (HumaLOG) corrective regimen sliding scale   SubCutaneous at bedtime  insulin lispro Injectable (HumaLOG) 3 Unit(s) SubCutaneous three times a day before meals  sodium chloride 0.9%. 1000 milliLiter(s) (30 mL/Hr) IV Continuous <Continuous>  vancomycin  IVPB 1000 milliGRAM(s) IV Intermittent every 12 hours    MEDICATIONS  (PRN):  acetaminophen   Tablet .. 650 milliGRAM(s) Oral every 6 hours PRN Mild Pain (1 - 3)  dextrose 40% Gel 15 Gram(s) Oral once PRN Blood Glucose LESS THAN 70 milliGRAM(s)/deciliter  glucagon  Injectable 1 milliGRAM(s) IntraMuscular once PRN Glucose LESS THAN 70 milligrams/deciliter  oxyCODONE    5 mG/acetaminophen 325 mG 1 Tablet(s) Oral every 4 hours PRN Moderate Pain (4 - 6)    Vital Signs Last 24 Hrs  T(C): 36.9 (19 Sep 2019 22:05), Max: 37 (19 Sep 2019 05:44)  T(F): 98.5 (19 Sep 2019 22:05), Max: 98.6 (19 Sep 2019 05:44)  HR: 87 (19 Sep 2019 22:05) (87 - 96)  BP: 122/70 (19 Sep 2019 22:05) (114/72 - 125/73)  BP(mean): --  RR: 16 (19 Sep 2019 22:05) (16 - 17)  SpO2: 100% (19 Sep 2019 22:05) (98% - 100%)    Constitutional: No fever, fatigue  Skin: No rash.  Eyes: No recent vision problems or eye pain.  ENT: No congestion, ear pain, or sore throat.  Cardiovascular: No chest pain or palpation.  Respiratory: No cough, shortness of breath, congestion, or wheezing.  Gastrointestinal: No abdominal pain, nausea, vomiting, or diarrhea.  Genitourinary: No dysuria.  Musculoskeletal: No joint swelling.  Neurologic: No headache.    PHYSICAL EXAM:  GENERAL: NAD  EYES: EOMI, PERRLA  NECK: Supple, No JVD  CHEST/LUNG: cta charan   HEART:  S1 , S2 +  ABDOMEN: soft , bs+  EXTREMITIES: left  foot dressing+  NEUROLOGY:alert awake oriented     LABS:  09-19    139  |  101  |  12  ----------------------------<  169<H>  3.7   |  26  |  1.15    Ca    8.7      19 Sep 2019 05:30  Phos  3.1     09-18  Mg     1.7     09-18      Creatinine Trend: 1.15 <--, 1.15 <--, 1.07 <--, 1.29 <--                        8.4    6.75  )-----------( 413      ( 19 Sep 2019 05:30 )             26.8     Urine Studies:                    Alb: 2.6 g/dL / Pro: 6.9 g/dL / ALK PHOS: 50 u/L / ALT: 17 u/L / AST: 20 u/L / GGT: x                 LIVER FUNCTIONS - ( 16 Sep 2019 06:50 )  Alb: 2.6 g/dL / Pro: 6.9 g/dL / ALK PHOS: 50 u/L / ALT: 17 u/L / AST: 20 u/L / GGT: x

## 2019-09-19 NOTE — PROGRESS NOTE ADULT - SUBJECTIVE AND OBJECTIVE BOX
Patient is a 39y old  Male who presents with a chief complaint of Left foot pain and swelling (18 Sep 2019 11:01)       INTERVAL HPI/OVERNIGHT EVENTS:  Patient seen and evaluated at bedside.  Pt is resting comfortable in NAD. Denies N/V/F/C.      Allergies    No Known Allergies    Intolerances        Vital Signs Last 24 Hrs  T(C): 37 (19 Sep 2019 05:44), Max: 37 (19 Sep 2019 05:44)  T(F): 98.6 (19 Sep 2019 05:44), Max: 98.6 (19 Sep 2019 05:44)  HR: 90 (19 Sep 2019 05:44) (84 - 90)  BP: 125/73 (19 Sep 2019 05:44) (103/52 - 125/73)  BP(mean): --  RR: 17 (19 Sep 2019 05:44) (16 - 17)  SpO2: 98% (19 Sep 2019 05:44) (98% - 100%)    LABS:                        8.4    6.75  )-----------( 413      ( 19 Sep 2019 05:30 )             26.8     09-19    139  |  101  |  12  ----------------------------<  169<H>  3.7   |  26  |  1.15    Ca    8.7      19 Sep 2019 05:30  Phos  3.1     09-18  Mg     1.7     09-18          CAPILLARY BLOOD GLUCOSE      POCT Blood Glucose.: 131 mg/dL (19 Sep 2019 08:41)  POCT Blood Glucose.: 172 mg/dL (18 Sep 2019 21:57)  POCT Blood Glucose.: 190 mg/dL (18 Sep 2019 17:29)  POCT Blood Glucose.: 159 mg/dL (18 Sep 2019 12:01)      Lower Extremity Physical Exam:  Vascular: DP/PT 2/4, B/L, CFT <3 seconds B/L, Temperature gradient warm to warm B/L.   Neuro: Epicritic sensation absent to the level of midfoot, B/L.  Musculoskeletal/Ortho: s/p LF partial fourth ray resection  Derm: s/p L foot partial 1st ray resection w/ 2nd metatarsal head bone biopsy- closed (DOS 9/12/19)  POD#7- surgical site sutures intact, no hematoma, no active drainage, no dehiscence, no acute signs of infection, maceration to proximal aspect of medial incision - likely from weight bearing    RADIOLOGY & ADDITIONAL TESTS:

## 2019-09-19 NOTE — PROGRESS NOTE ADULT - ASSESSMENT
39M s/p L foot partial 1st ray resection w/ 2nd metatarsal head bone biopsy- closed (DOS 9/12/19)  - Pt seen and evaluated, POD #5  - Afebrile, no leukocytosis  - LF surgical site sutures intact, no dehiscence, no hematoma, scant sanguinous drainage, no acute signs of infection, maceration to proximal aspect of medial incision   - dressed surgical site w/  DSD  - high concern for residual infection- awaiting final OR path/ Cx sensitives prior to d/c; cultures currently growing pseudomonas and coag neg staph  - cont IV abx per ID, appreciate recs  - ordered crutche s- to be used for 2 weeks to limit weightbearing to sx site   - d/w with attending

## 2019-09-20 LAB
ANION GAP SERPL CALC-SCNC: 9 MMO/L — SIGNIFICANT CHANGE UP (ref 7–14)
BUN SERPL-MCNC: 11 MG/DL — SIGNIFICANT CHANGE UP (ref 7–23)
CALCIUM SERPL-MCNC: 8.4 MG/DL — SIGNIFICANT CHANGE UP (ref 8.4–10.5)
CHLORIDE SERPL-SCNC: 103 MMOL/L — SIGNIFICANT CHANGE UP (ref 98–107)
CO2 SERPL-SCNC: 26 MMOL/L — SIGNIFICANT CHANGE UP (ref 22–31)
CREAT SERPL-MCNC: 1.1 MG/DL — SIGNIFICANT CHANGE UP (ref 0.5–1.3)
GLUCOSE BLDC GLUCOMTR-MCNC: 103 MG/DL — HIGH (ref 70–99)
GLUCOSE BLDC GLUCOMTR-MCNC: 134 MG/DL — HIGH (ref 70–99)
GLUCOSE BLDC GLUCOMTR-MCNC: 180 MG/DL — HIGH (ref 70–99)
GLUCOSE BLDC GLUCOMTR-MCNC: 212 MG/DL — HIGH (ref 70–99)
GLUCOSE SERPL-MCNC: 205 MG/DL — HIGH (ref 70–99)
HCT VFR BLD CALC: 25.2 % — LOW (ref 39–50)
HGB BLD-MCNC: 7.7 G/DL — LOW (ref 13–17)
MCHC RBC-ENTMCNC: 26.6 PG — LOW (ref 27–34)
MCHC RBC-ENTMCNC: 30.6 % — LOW (ref 32–36)
MCV RBC AUTO: 86.9 FL — SIGNIFICANT CHANGE UP (ref 80–100)
NRBC # FLD: 0 K/UL — SIGNIFICANT CHANGE UP (ref 0–0)
PLATELET # BLD AUTO: 437 K/UL — HIGH (ref 150–400)
PMV BLD: 9.2 FL — SIGNIFICANT CHANGE UP (ref 7–13)
POTASSIUM SERPL-MCNC: 3.8 MMOL/L — SIGNIFICANT CHANGE UP (ref 3.5–5.3)
POTASSIUM SERPL-SCNC: 3.8 MMOL/L — SIGNIFICANT CHANGE UP (ref 3.5–5.3)
RBC # BLD: 2.9 M/UL — LOW (ref 4.2–5.8)
RBC # FLD: 12.6 % — SIGNIFICANT CHANGE UP (ref 10.3–14.5)
SODIUM SERPL-SCNC: 138 MMOL/L — SIGNIFICANT CHANGE UP (ref 135–145)
WBC # BLD: 6.41 K/UL — SIGNIFICANT CHANGE UP (ref 3.8–10.5)
WBC # FLD AUTO: 6.41 K/UL — SIGNIFICANT CHANGE UP (ref 3.8–10.5)

## 2019-09-20 PROCEDURE — 99232 SBSQ HOSP IP/OBS MODERATE 35: CPT

## 2019-09-20 RX ADMIN — Medication 3 UNIT(S): at 18:02

## 2019-09-20 RX ADMIN — SODIUM CHLORIDE 30 MILLILITER(S): 9 INJECTION INTRAMUSCULAR; INTRAVENOUS; SUBCUTANEOUS at 22:34

## 2019-09-20 RX ADMIN — Medication 200 MILLIGRAM(S): at 05:20

## 2019-09-20 RX ADMIN — CILOSTAZOL 50 MILLIGRAM(S): 100 TABLET ORAL at 05:20

## 2019-09-20 RX ADMIN — SODIUM CHLORIDE 30 MILLILITER(S): 9 INJECTION INTRAMUSCULAR; INTRAVENOUS; SUBCUTANEOUS at 08:52

## 2019-09-20 RX ADMIN — Medication 250 MILLIGRAM(S): at 15:34

## 2019-09-20 RX ADMIN — Medication 1: at 18:01

## 2019-09-20 RX ADMIN — Medication 3 UNIT(S): at 13:09

## 2019-09-20 RX ADMIN — Medication 3 UNIT(S): at 08:50

## 2019-09-20 RX ADMIN — INSULIN GLARGINE 8 UNIT(S): 100 INJECTION, SOLUTION SUBCUTANEOUS at 22:34

## 2019-09-20 RX ADMIN — CILOSTAZOL 50 MILLIGRAM(S): 100 TABLET ORAL at 19:25

## 2019-09-20 RX ADMIN — Medication 200 MILLIGRAM(S): at 17:45

## 2019-09-20 NOTE — PROGRESS NOTE ADULT - SUBJECTIVE AND OBJECTIVE BOX
Chief Complaint: DM2    History: Glucose trending at goal, - patient denies complaints at present time     MEDICATIONS  (STANDING):  cilostazol 50 milliGRAM(s) Oral two times a day  ciprofloxacin   IVPB 400 milliGRAM(s) IV Intermittent every 12 hours  dextrose 5%. 1000 milliLiter(s) (50 mL/Hr) IV Continuous <Continuous>  dextrose 50% Injectable 12.5 Gram(s) IV Push once  dextrose 50% Injectable 25 Gram(s) IV Push once  dextrose 50% Injectable 25 Gram(s) IV Push once  ergocalciferol 79854 Unit(s) Oral <User Schedule>  influenza   Vaccine 0.5 milliLiter(s) IntraMuscular once  insulin glargine Injectable (LANTUS) 8 Unit(s) SubCutaneous at bedtime  insulin lispro (HumaLOG) corrective regimen sliding scale   SubCutaneous three times a day before meals  insulin lispro (HumaLOG) corrective regimen sliding scale   SubCutaneous at bedtime  insulin lispro Injectable (HumaLOG) 3 Unit(s) SubCutaneous three times a day before meals  sodium chloride 0.9%. 1000 milliLiter(s) (30 mL/Hr) IV Continuous <Continuous>  vancomycin  IVPB 1000 milliGRAM(s) IV Intermittent every 12 hours    MEDICATIONS  (PRN):  acetaminophen   Tablet .. 650 milliGRAM(s) Oral every 6 hours PRN Mild Pain (1 - 3)  dextrose 40% Gel 15 Gram(s) Oral once PRN Blood Glucose LESS THAN 70 milliGRAM(s)/deciliter  glucagon  Injectable 1 milliGRAM(s) IntraMuscular once PRN Glucose LESS THAN 70 milligrams/deciliter  oxyCODONE    5 mG/acetaminophen 325 mG 1 Tablet(s) Oral every 4 hours PRN Moderate Pain (4 - 6)          No Known Allergies        Review of Systems:  Constitutional: No fever  Cardiovascular: No chest pain, palpitations  Respiratory: No SOB, no cough  GI: No nausea, vomiting, abdominal pain    PHYSICAL EXAM:  VITALS: T(C): 36.9 (09-20-19 @ 10:28)  T(F): 98.4 (09-20-19 @ 10:28), Max: 98.7 (09-20-19 @ 05:16)  HR: 89 (09-20-19 @ 10:28) (78 - 90)  BP: 107/65 (09-20-19 @ 10:28) (105/65 - 122/70)  RR:  (16 - 17)  SpO2:  (99% - 100%)  Wt(kg): --  GENERAL: NAD  EYES: No proptosis, no lid lag, anicterics  RESPIRATORY: Non labored  GI: Soft, nontender, non distended  PSYCH: Alert and oriented x 3      CAPILLARY BLOOD GLUCOSE      POCT Blood Glucose.: 103 mg/dL (20 Sep 2019 12:16)  POCT Blood Glucose.: 134 mg/dL (20 Sep 2019 08:45)  POCT Blood Glucose.: 220 mg/dL (19 Sep 2019 21:41)  POCT Blood Glucose.: 181 mg/dL (19 Sep 2019 17:12)      09-20    138  |  103  |  11  ----------------------------<  205<H>  3.8   |  26  |  1.10    EGFR if : 98  EGFR if non : 85    Ca    8.4      09-20  Mg     1.7     09-18  Phos  3.1     09-18                Hemoglobin A1C, Whole Blood: 11.9 % <H> [4.0 - 5.6] (09-10-19 @ 05:42)  Hemoglobin A1C, Whole Blood: 14.1 % <H> [4.0 - 5.6] (08-18-19 @ 12:00)  Hemoglobin A1C, Whole Blood: 14.0 % <H> [4.0 - 5.6] (08-17-19 @ 11:20)

## 2019-09-20 NOTE — PROVIDER CONTACT NOTE (OTHER) - REASON
Patient refused vanco trough earlier in day shift. Do I need to get vanco trough now or before his 5th dose?

## 2019-09-20 NOTE — CHART NOTE - NSCHARTNOTEFT_GEN_A_CORE
PRE-INTERVENTIONAL RADIOLOGY PROCEDURE NOTE      Patient Age: 38 yo    Patient Gender: Male    Procedure: PICC line placement    Diagnosis/Indication: IV antibiotics x 6weeks    Interventional Radiology Attending Physician: Dr Swanson    Ordering Attending Physician: Dr Aguirre    Pertinent Medical History: Lt toe OM, DM    Pertinent labs:                      7.7    6.41  )-----------( 437      ( 20 Sep 2019 06:31 )             25.2       09-20    138  |  103  |  11  ----------------------------<  205<H>  3.8   |  26  |  1.10    Ca    8.4      20 Sep 2019 06:31          Case discussed with Max MANCINI pt approved for PICC line, pending Rehab       Patient and Family Aware ? Yes

## 2019-09-20 NOTE — PROGRESS NOTE ADULT - SUBJECTIVE AND OBJECTIVE BOX
SUBJECTIVE / OVERNIGHT EVENTS: pt denies chest pain, shortness of breath   MEDICATIONS  (STANDING):  cilostazol 50 milliGRAM(s) Oral two times a day  ciprofloxacin   IVPB 400 milliGRAM(s) IV Intermittent every 12 hours  dextrose 5%. 1000 milliLiter(s) (50 mL/Hr) IV Continuous <Continuous>  dextrose 50% Injectable 12.5 Gram(s) IV Push once  dextrose 50% Injectable 25 Gram(s) IV Push once  dextrose 50% Injectable 25 Gram(s) IV Push once  ergocalciferol 99922 Unit(s) Oral <User Schedule>  influenza   Vaccine 0.5 milliLiter(s) IntraMuscular once  insulin glargine Injectable (LANTUS) 8 Unit(s) SubCutaneous at bedtime  insulin lispro (HumaLOG) corrective regimen sliding scale   SubCutaneous three times a day before meals  insulin lispro (HumaLOG) corrective regimen sliding scale   SubCutaneous at bedtime  insulin lispro Injectable (HumaLOG) 3 Unit(s) SubCutaneous three times a day before meals  sodium chloride 0.9%. 1000 milliLiter(s) (30 mL/Hr) IV Continuous <Continuous>  vancomycin  IVPB 1000 milliGRAM(s) IV Intermittent every 12 hours    MEDICATIONS  (PRN):  acetaminophen   Tablet .. 650 milliGRAM(s) Oral every 6 hours PRN Mild Pain (1 - 3)  dextrose 40% Gel 15 Gram(s) Oral once PRN Blood Glucose LESS THAN 70 milliGRAM(s)/deciliter  glucagon  Injectable 1 milliGRAM(s) IntraMuscular once PRN Glucose LESS THAN 70 milligrams/deciliter  oxyCODONE    5 mG/acetaminophen 325 mG 1 Tablet(s) Oral every 4 hours PRN Moderate Pain (4 - 6)    Vital Signs Last 24 Hrs  T(C): 37 (20 Sep 2019 18:45), Max: 37.1 (20 Sep 2019 05:16)  T(F): 98.6 (20 Sep 2019 18:45), Max: 98.7 (20 Sep 2019 05:16)  HR: 87 (20 Sep 2019 18:45) (78 - 89)  BP: 124/70 (20 Sep 2019 18:45) (105/65 - 124/70)  BP(mean): --  RR: 16 (20 Sep 2019 18:45) (16 - 17)  SpO2: 100% (20 Sep 2019 18:45) (99% - 100%)    Constitutional: No fever, fatigue  Skin: No rash.  Eyes: No recent vision problems or eye pain.  ENT: No congestion, ear pain, or sore throat.  Cardiovascular: No chest pain or palpation.  Respiratory: No cough, shortness of breath, congestion, or wheezing.  Gastrointestinal: No abdominal pain, nausea, vomiting, or diarrhea.  Genitourinary: No dysuria.  Musculoskeletal: No joint swelling.  Neurologic: No headache.    PHYSICAL EXAM:  GENERAL: NAD  EYES: EOMI, PERRLA  NECK: Supple, No JVD  CHEST/LUNG: cta charan   HEART:  S1 , S2 +  ABDOMEN: soft , bs+  EXTREMITIES: left  foot dressing+  NEUROLOGY:alert awake oriented     LABS:  09-20    138  |  103  |  11  ----------------------------<  205<H>  3.8   |  26  |  1.10    Ca    8.4      20 Sep 2019 06:31      Creatinine Trend: 1.10 <--, 1.15 <--, 1.15 <--, 1.07 <--, 1.29 <--                        7.7    6.41  )-----------( 437      ( 20 Sep 2019 06:31 )             25.2     Urine Studies:                  Alb: 2.6 g/dL / Pro: 6.9 g/dL / ALK PHOS: 50 u/L / ALT: 17 u/L / AST: 20 u/L / GGT: x                 LIVER FUNCTIONS - ( 16 Sep 2019 06:50 )  Alb: 2.6 g/dL / Pro: 6.9 g/dL / ALK PHOS: 50 u/L / ALT: 17 u/L / AST: 20 u/L / GGT: x

## 2019-09-20 NOTE — PROGRESS NOTE ADULT - PROBLEM SELECTOR PLAN 1
See complete consult for full plan of care including HLD target and recommendation   - Glucose improved and now mostly at inpatient target of 100-180  - for now, would monitor on Humalog 3 units TID and Lantus 8 units qhs.  - c/w low correction scale qac and qhs  - continue carb consistent diet  - patient does not have typical phenotype of DM2 (signs of insulin resistance on physical exam, no BMI recorded but appears to have normal BMI on exam, no family history of DM), thus would rule out DAWIT/DM1. F/U HAIM Ab. Islet cell Ab neg. C-peptide 2.0 so likely DM 2 but would await HAIM result.   - for discharge: likely basal bolus insulin, doses to be determined. Will need to determine how patient can store his insulin at his shelter. final plan to be determined.   He can follow up in the office on discharge - [phone] [address], [address]  CDE visit 10/11/19 @ 3:45  Dr Mckinney 11/26/19 @ 11

## 2019-09-21 LAB
GAD65 AB SER-MCNC: 0.02 NMOL/L — SIGNIFICANT CHANGE UP
GLUCOSE BLDC GLUCOMTR-MCNC: 114 MG/DL — HIGH (ref 70–99)
GLUCOSE BLDC GLUCOMTR-MCNC: 186 MG/DL — HIGH (ref 70–99)
GLUCOSE BLDC GLUCOMTR-MCNC: 194 MG/DL — HIGH (ref 70–99)
GLUCOSE BLDC GLUCOMTR-MCNC: 205 MG/DL — HIGH (ref 70–99)
VANCOMYCIN TROUGH SERPL-MCNC: 16.1 UG/ML — SIGNIFICANT CHANGE UP (ref 10–20)

## 2019-09-21 RX ORDER — INSULIN LISPRO 100/ML
3 VIAL (ML) SUBCUTANEOUS
Qty: 5 | Refills: 0
Start: 2019-09-21 | End: 2019-10-20

## 2019-09-21 RX ADMIN — Medication 250 MILLIGRAM(S): at 04:28

## 2019-09-21 RX ADMIN — Medication 1: at 09:09

## 2019-09-21 RX ADMIN — Medication 3 UNIT(S): at 17:45

## 2019-09-21 RX ADMIN — Medication 200 MILLIGRAM(S): at 06:03

## 2019-09-21 RX ADMIN — CILOSTAZOL 50 MILLIGRAM(S): 100 TABLET ORAL at 06:03

## 2019-09-21 RX ADMIN — Medication 250 MILLIGRAM(S): at 14:23

## 2019-09-21 RX ADMIN — Medication 200 MILLIGRAM(S): at 17:44

## 2019-09-21 RX ADMIN — INSULIN GLARGINE 8 UNIT(S): 100 INJECTION, SOLUTION SUBCUTANEOUS at 22:21

## 2019-09-21 RX ADMIN — Medication 3 UNIT(S): at 12:36

## 2019-09-21 RX ADMIN — Medication 1: at 17:45

## 2019-09-21 RX ADMIN — CILOSTAZOL 50 MILLIGRAM(S): 100 TABLET ORAL at 17:45

## 2019-09-21 RX ADMIN — SODIUM CHLORIDE 30 MILLILITER(S): 9 INJECTION INTRAMUSCULAR; INTRAVENOUS; SUBCUTANEOUS at 22:22

## 2019-09-21 RX ADMIN — Medication 3 UNIT(S): at 09:10

## 2019-09-21 NOTE — PROGRESS NOTE ADULT - SUBJECTIVE AND OBJECTIVE BOX
SUBJECTIVE / OVERNIGHT EVENTS: pt denies chest pain, shortness of breath     MEDICATIONS  (STANDING):  cilostazol 50 milliGRAM(s) Oral two times a day  ciprofloxacin   IVPB 400 milliGRAM(s) IV Intermittent every 12 hours  dextrose 5%. 1000 milliLiter(s) (50 mL/Hr) IV Continuous <Continuous>  dextrose 50% Injectable 12.5 Gram(s) IV Push once  dextrose 50% Injectable 25 Gram(s) IV Push once  dextrose 50% Injectable 25 Gram(s) IV Push once  ergocalciferol 05870 Unit(s) Oral <User Schedule>  influenza   Vaccine 0.5 milliLiter(s) IntraMuscular once  insulin glargine Injectable (LANTUS) 8 Unit(s) SubCutaneous at bedtime  insulin lispro (HumaLOG) corrective regimen sliding scale   SubCutaneous three times a day before meals  insulin lispro (HumaLOG) corrective regimen sliding scale   SubCutaneous at bedtime  insulin lispro Injectable (HumaLOG) 3 Unit(s) SubCutaneous three times a day before meals  sodium chloride 0.9%. 1000 milliLiter(s) (30 mL/Hr) IV Continuous <Continuous>  vancomycin  IVPB 1000 milliGRAM(s) IV Intermittent every 12 hours    MEDICATIONS  (PRN):  acetaminophen   Tablet .. 650 milliGRAM(s) Oral every 6 hours PRN Mild Pain (1 - 3)  dextrose 40% Gel 15 Gram(s) Oral once PRN Blood Glucose LESS THAN 70 milliGRAM(s)/deciliter  glucagon  Injectable 1 milliGRAM(s) IntraMuscular once PRN Glucose LESS THAN 70 milligrams/deciliter  oxyCODONE    5 mG/acetaminophen 325 mG 1 Tablet(s) Oral every 4 hours PRN Moderate Pain (4 - 6)    Vital Signs Last 24 Hrs  T(C): 36.7 (21 Sep 2019 22:00), Max: 36.7 (21 Sep 2019 06:05)  T(F): 98.1 (21 Sep 2019 22:00), Max: 98.1 (21 Sep 2019 22:00)  HR: 90 (21 Sep 2019 22:00) (80 - 90)  BP: 116/58 (21 Sep 2019 22:00) (106/49 - 120/72)  BP(mean): --  RR: 16 (21 Sep 2019 22:00) (16 - 18)  SpO2: 100% (21 Sep 2019 22:00) (99% - 100%)    Constitutional: No fever, fatigue  Skin: No rash.  Eyes: No recent vision problems or eye pain.  ENT: No congestion, ear pain, or sore throat.  Cardiovascular: No chest pain or palpation.  Respiratory: No cough, shortness of breath, congestion, or wheezing.  Gastrointestinal: No abdominal pain, nausea, vomiting, or diarrhea.  Genitourinary: No dysuria.  Musculoskeletal: No joint swelling.  Neurologic: No headache.    PHYSICAL EXAM:  GENERAL: NAD  EYES: EOMI, PERRLA  NECK: Supple, No JVD  CHEST/LUNG: cta charan   HEART:  S1 , S2 +  ABDOMEN: soft , bs+  EXTREMITIES: left  foot dressing+  NEUROLOGY:alert awake oriented     LABS:  09-20    138  |  103  |  11  ----------------------------<  205<H>  3.8   |  26  |  1.10    Ca    8.4      20 Sep 2019 06:31      Creatinine Trend: 1.10 <--, 1.15 <--, 1.15 <--, 1.07 <--                        7.7    6.41  )-----------( 437      ( 20 Sep 2019 06:31 )             25.2     Urine Studies:

## 2019-09-21 NOTE — PROVIDER CONTACT NOTE (OTHER) - BACKGROUND
Patient refused earlier vanco trough during day shift on 9/20. Patient is to get 5th dose of vanco at 3am and is refusing the trough again

## 2019-09-22 LAB
BLD GP AB SCN SERPL QL: NEGATIVE — SIGNIFICANT CHANGE UP
GLUCOSE BLDC GLUCOMTR-MCNC: 111 MG/DL — HIGH (ref 70–99)
GLUCOSE BLDC GLUCOMTR-MCNC: 136 MG/DL — HIGH (ref 70–99)
GLUCOSE BLDC GLUCOMTR-MCNC: 140 MG/DL — HIGH (ref 70–99)
GLUCOSE BLDC GLUCOMTR-MCNC: 162 MG/DL — HIGH (ref 70–99)
GLUCOSE BLDC GLUCOMTR-MCNC: 213 MG/DL — HIGH (ref 70–99)
HCT VFR BLD CALC: 26.9 % — LOW (ref 39–50)
HGB BLD-MCNC: 8.4 G/DL — LOW (ref 13–17)
MCHC RBC-ENTMCNC: 26.6 PG — LOW (ref 27–34)
MCHC RBC-ENTMCNC: 31.2 % — LOW (ref 32–36)
MCV RBC AUTO: 85.1 FL — SIGNIFICANT CHANGE UP (ref 80–100)
NRBC # FLD: 0 K/UL — SIGNIFICANT CHANGE UP (ref 0–0)
PLATELET # BLD AUTO: 361 K/UL — SIGNIFICANT CHANGE UP (ref 150–400)
PMV BLD: 9.2 FL — SIGNIFICANT CHANGE UP (ref 7–13)
RBC # BLD: 3.16 M/UL — LOW (ref 4.2–5.8)
RBC # FLD: 12.9 % — SIGNIFICANT CHANGE UP (ref 10.3–14.5)
RH IG SCN BLD-IMP: POSITIVE — SIGNIFICANT CHANGE UP
WBC # BLD: 4.36 K/UL — SIGNIFICANT CHANGE UP (ref 3.8–10.5)
WBC # FLD AUTO: 4.36 K/UL — SIGNIFICANT CHANGE UP (ref 3.8–10.5)

## 2019-09-22 RX ADMIN — CILOSTAZOL 50 MILLIGRAM(S): 100 TABLET ORAL at 06:24

## 2019-09-22 RX ADMIN — CILOSTAZOL 50 MILLIGRAM(S): 100 TABLET ORAL at 17:13

## 2019-09-22 RX ADMIN — Medication 3 UNIT(S): at 17:50

## 2019-09-22 RX ADMIN — SODIUM CHLORIDE 30 MILLILITER(S): 9 INJECTION INTRAMUSCULAR; INTRAVENOUS; SUBCUTANEOUS at 21:36

## 2019-09-22 RX ADMIN — SODIUM CHLORIDE 30 MILLILITER(S): 9 INJECTION INTRAMUSCULAR; INTRAVENOUS; SUBCUTANEOUS at 07:37

## 2019-09-22 RX ADMIN — Medication 3 UNIT(S): at 12:44

## 2019-09-22 RX ADMIN — Medication 250 MILLIGRAM(S): at 14:50

## 2019-09-22 RX ADMIN — Medication 250 MILLIGRAM(S): at 02:41

## 2019-09-22 RX ADMIN — Medication 3 UNIT(S): at 09:30

## 2019-09-22 RX ADMIN — Medication 200 MILLIGRAM(S): at 17:12

## 2019-09-22 RX ADMIN — Medication 200 MILLIGRAM(S): at 06:25

## 2019-09-22 RX ADMIN — Medication 2: at 17:49

## 2019-09-22 RX ADMIN — INSULIN GLARGINE 8 UNIT(S): 100 INJECTION, SOLUTION SUBCUTANEOUS at 21:35

## 2019-09-22 NOTE — PROGRESS NOTE ADULT - PROBLEM SELECTOR PLAN 2
uncontrolled  iss

## 2019-09-22 NOTE — PROGRESS NOTE ADULT - PROBLEM SELECTOR PROBLEM 2
Type 2 diabetes mellitus without complication, with long-term current use of insulin

## 2019-09-22 NOTE — PROGRESS NOTE ADULT - PROBLEM SELECTOR PLAN 3
- DVT: SCDs, low Improve score  - Diet: Consistent carbohydrate    Tori Constantino MD, PGY-2  [id number]
- DVT: SCDs, low Improve score  - Diet: Consistent carbohydrate    Tori Constantino MD, PGY-2  21509
- DVT: SCDs, low Improve score  - Diet: Consistent carbohydrate    Tori Constantino MD, PGY-2  01385
- DVT: SCDs, low Improve score  - Diet: Consistent carbohydrate    Tori Constantino MD, PGY-2  [id number]
- DVT: SCDs, low Improve score  - Diet: Consistent carbohydrate    Tori Constantino MD, PGY-2  08895
- DVT: SCDs, low Improve score  - Diet: Consistent carbohydrate    Tori Constantino MD, PGY-2  35149
- DVT: SCDs, low Improve score  - Diet: Consistent carbohydrate    Tori Constantino MD, PGY-2  50019
- DVT: SCDs, low Improve score  - Diet: Consistent carbohydrate    Tori Constantino MD, PGY-2  53391
- DVT: SCDs, low Improve score  - Diet: Consistent carbohydrate    Tori Constantino MD, PGY-2  57155
- DVT: SCDs, low Improve score  - Diet: Consistent carbohydrate    Tori Constantino MD, PGY-2  [id number]
- DVT: SCDs, low Improve score  - Diet: Consistent carbohydrate    Tori Constantino MD, PGY-2  [id number]
- DVT: SCDs, low Improve score  - Diet: Consistent carbohydrate    Tori Constantino MD, PGY-2  [id number]
- DVT: SCDs, low Improve score  - Diet: Consistent carbohydrate    Tori Constantino MD, PGY-2  [id number]

## 2019-09-22 NOTE — PROGRESS NOTE ADULT - PROVIDER SPECIALTY LIST ADULT
Endocrinology
Endocrinology
Infectious Disease
Internal Medicine
Podiatry
Vascular Surgery
Endocrinology
Internal Medicine
Podiatry
Internal Medicine

## 2019-09-22 NOTE — PROGRESS NOTE ADULT - PROBLEM SELECTOR PROBLEM 1
Arterial insufficiency with ischemic ulcer
Osteomyelitis of foot, left, acute
Uncontrolled type 2 diabetes with retinopathy
Uncontrolled type 2 diabetes with retinopathy
Osteomyelitis of foot, left, acute
Uncontrolled type 2 diabetes with retinopathy
Osteomyelitis of foot, left, acute

## 2019-09-22 NOTE — PROGRESS NOTE ADULT - REASON FOR ADMISSION
Left foot pain and swelling

## 2019-09-22 NOTE — PROGRESS NOTE ADULT - SUBJECTIVE AND OBJECTIVE BOX
SUBJECTIVE / OVERNIGHT EVENTS: pt denies chest pain, shortness of breath     MEDICATIONS  (STANDING):  cilostazol 50 milliGRAM(s) Oral two times a day  ciprofloxacin   IVPB 400 milliGRAM(s) IV Intermittent every 12 hours  dextrose 5%. 1000 milliLiter(s) (50 mL/Hr) IV Continuous <Continuous>  dextrose 50% Injectable 12.5 Gram(s) IV Push once  dextrose 50% Injectable 25 Gram(s) IV Push once  dextrose 50% Injectable 25 Gram(s) IV Push once  ergocalciferol 66639 Unit(s) Oral <User Schedule>  influenza   Vaccine 0.5 milliLiter(s) IntraMuscular once  insulin glargine Injectable (LANTUS) 8 Unit(s) SubCutaneous at bedtime  insulin lispro (HumaLOG) corrective regimen sliding scale   SubCutaneous three times a day before meals  insulin lispro (HumaLOG) corrective regimen sliding scale   SubCutaneous at bedtime  insulin lispro Injectable (HumaLOG) 3 Unit(s) SubCutaneous three times a day before meals  sodium chloride 0.9%. 1000 milliLiter(s) (30 mL/Hr) IV Continuous <Continuous>  vancomycin  IVPB 1000 milliGRAM(s) IV Intermittent every 12 hours    MEDICATIONS  (PRN):  acetaminophen   Tablet .. 650 milliGRAM(s) Oral every 6 hours PRN Mild Pain (1 - 3)  dextrose 40% Gel 15 Gram(s) Oral once PRN Blood Glucose LESS THAN 70 milliGRAM(s)/deciliter  glucagon  Injectable 1 milliGRAM(s) IntraMuscular once PRN Glucose LESS THAN 70 milligrams/deciliter  oxyCODONE    5 mG/acetaminophen 325 mG 1 Tablet(s) Oral every 4 hours PRN Moderate Pain (4 - 6)    Vital Signs Last 24 Hrs  T(C): 37.4 (22 Sep 2019 20:14), Max: 37.4 (22 Sep 2019 20:14)  T(F): 99.3 (22 Sep 2019 20:14), Max: 99.3 (22 Sep 2019 20:14)  HR: 90 (22 Sep 2019 20:14) (85 - 90)  BP: 120/72 (22 Sep 2019 20:14) (114/68 - 121/79)  BP(mean): --  RR: 17 (22 Sep 2019 20:14) (17 - 18)  SpO2: 100% (22 Sep 2019 20:14) (100% - 100%)    Constitutional: No fever, fatigue  Skin: No rash.  Eyes: No recent vision problems or eye pain.  ENT: No congestion, ear pain, or sore throat.  Cardiovascular: No chest pain or palpation.  Respiratory: No cough, shortness of breath, congestion, or wheezing.  Gastrointestinal: No abdominal pain, nausea, vomiting, or diarrhea.  Genitourinary: No dysuria.  Musculoskeletal: No joint swelling.  Neurologic: No headache.    PHYSICAL EXAM:  GENERAL: NAD  EYES: EOMI, PERRLA  NECK: Supple, No JVD  CHEST/LUNG: cta charan   HEART:  S1 , S2 +  ABDOMEN: soft , bs+  EXTREMITIES: left  foot dressing+  NEUROLOGY:alert awake oriented     LABS:        Creatinine Trend: 1.10 <--, 1.15 <--, 1.15 <--, 1.07 <--                        8.4    4.36  )-----------( 361      ( 22 Sep 2019 06:46 )             26.9     Urine Studies:                            Alb: 2.6 g/dL / Pro: 6.9 g/dL / ALK PHOS: 50 u/L / ALT: 17 u/L / AST: 20 u/L / GGT: x                 LIVER FUNCTIONS - ( 16 Sep 2019 06:50 )  Alb: 2.6 g/dL / Pro: 6.9 g/dL / ALK PHOS: 50 u/L / ALT: 17 u/L / AST: 20 u/L / GGT: x

## 2019-09-23 ENCOUNTER — TRANSCRIPTION ENCOUNTER (OUTPATIENT)
Age: 39
End: 2019-09-23

## 2019-09-23 VITALS
OXYGEN SATURATION: 100 % | DIASTOLIC BLOOD PRESSURE: 56 MMHG | HEART RATE: 106 BPM | TEMPERATURE: 99 F | RESPIRATION RATE: 19 BRPM | SYSTOLIC BLOOD PRESSURE: 117 MMHG

## 2019-09-23 LAB
GLUCOSE BLDC GLUCOMTR-MCNC: 148 MG/DL — HIGH (ref 70–99)
GLUCOSE BLDC GLUCOMTR-MCNC: 157 MG/DL — HIGH (ref 70–99)
HCT VFR BLD CALC: 26.5 % — LOW (ref 39–50)
HGB BLD-MCNC: 8.4 G/DL — LOW (ref 13–17)
MCHC RBC-ENTMCNC: 26.9 PG — LOW (ref 27–34)
MCHC RBC-ENTMCNC: 31.7 % — LOW (ref 32–36)
MCV RBC AUTO: 84.9 FL — SIGNIFICANT CHANGE UP (ref 80–100)
NRBC # FLD: 0 K/UL — SIGNIFICANT CHANGE UP (ref 0–0)
PLATELET # BLD AUTO: 356 K/UL — SIGNIFICANT CHANGE UP (ref 150–400)
PMV BLD: 9.3 FL — SIGNIFICANT CHANGE UP (ref 7–13)
RBC # BLD: 3.12 M/UL — LOW (ref 4.2–5.8)
RBC # FLD: 13 % — SIGNIFICANT CHANGE UP (ref 10.3–14.5)
WBC # BLD: 4.63 K/UL — SIGNIFICANT CHANGE UP (ref 3.8–10.5)
WBC # FLD AUTO: 4.63 K/UL — SIGNIFICANT CHANGE UP (ref 3.8–10.5)

## 2019-09-23 PROCEDURE — 36573 INSJ PICC RS&I 5 YR+: CPT

## 2019-09-23 RX ORDER — ACETAMINOPHEN 500 MG
2 TABLET ORAL
Qty: 0 | Refills: 0 | DISCHARGE
Start: 2019-09-23

## 2019-09-23 RX ORDER — INSULIN GLARGINE 100 [IU]/ML
15 INJECTION, SOLUTION SUBCUTANEOUS
Qty: 0 | Refills: 0 | DISCHARGE

## 2019-09-23 RX ORDER — INSULIN GLARGINE 100 [IU]/ML
8 INJECTION, SOLUTION SUBCUTANEOUS
Qty: 0 | Refills: 0 | DISCHARGE
Start: 2019-09-23

## 2019-09-23 RX ORDER — CILOSTAZOL 100 MG/1
1 TABLET ORAL
Qty: 0 | Refills: 0 | DISCHARGE
Start: 2019-09-23

## 2019-09-23 RX ORDER — INSULIN LISPRO 100/ML
1 VIAL (ML) SUBCUTANEOUS
Qty: 5 | Refills: 0
Start: 2019-09-23 | End: 2019-10-22

## 2019-09-23 RX ORDER — VANCOMYCIN HCL 1 G
1 VIAL (EA) INTRAVENOUS
Qty: 56 | Refills: 0
Start: 2019-09-23 | End: 2019-10-20

## 2019-09-23 RX ORDER — INSULIN LISPRO 100/ML
1 VIAL (ML) SUBCUTANEOUS
Qty: 4 | Refills: 0
Start: 2019-09-23 | End: 2019-10-22

## 2019-09-23 RX ORDER — CIPROFLOXACIN LACTATE 400MG/40ML
40 VIAL (ML) INTRAVENOUS
Qty: 13500 | Refills: 0
Start: 2019-09-23 | End: 2019-10-20

## 2019-09-23 RX ADMIN — CILOSTAZOL 50 MILLIGRAM(S): 100 TABLET ORAL at 05:40

## 2019-09-23 RX ADMIN — Medication 200 MILLIGRAM(S): at 05:39

## 2019-09-23 RX ADMIN — Medication 250 MILLIGRAM(S): at 02:39

## 2019-09-23 RX ADMIN — SODIUM CHLORIDE 30 MILLILITER(S): 9 INJECTION INTRAMUSCULAR; INTRAVENOUS; SUBCUTANEOUS at 08:03

## 2019-09-23 RX ADMIN — Medication 250 MILLIGRAM(S): at 15:06

## 2019-09-23 RX ADMIN — Medication 3 UNIT(S): at 12:49

## 2019-09-23 RX ADMIN — Medication 1: at 12:47

## 2019-09-23 RX ADMIN — Medication 3 UNIT(S): at 09:02

## 2019-09-23 NOTE — DISCHARGE NOTE NURSING/CASE MANAGEMENT/SOCIAL WORK - PATIENT PORTAL LINK FT
You can access the FollowMyHealth Patient Portal offered by [facility] by registering at the following website: [email or web address]. By joining [employer]’s FollowMyHealth portal, you will also be able to view your health information using other applications (apps) compatible with our system.

## 2019-09-23 NOTE — PROVIDER CONTACT NOTE (OTHER) - ACTION/TREATMENT ORDERED:
NP says patient does not need a trough because he has been therapeutic on the same dose for a while. Will give next dose without a trough.

## 2019-09-23 NOTE — DISCHARGE NOTE NURSING/CASE MANAGEMENT/SOCIAL WORK - NSDPDISTO_GEN_ALL_CORE
[facility] pt with cva, will admit. pt with cva, will admit. ct with lacunar infarct. d/w dr messer for admission.

## 2019-10-11 ENCOUNTER — APPOINTMENT (OUTPATIENT)
Dept: ENDOCRINOLOGY | Facility: CLINIC | Age: 39
End: 2019-10-11

## 2019-11-05 NOTE — CHART NOTE - NSCHARTNOTEFT_GEN_A_CORE
Source: Patient [x] Medical record reviewed. Patient seen for length of stay nutrition follow-up. Patient reports good appetite/PO intake. Reports consuming >75% meals. Tolerating meals. No GI distress (nausea/vomiting/diarrhea/constipation) noted at this time.    Current Diet : Diet, Consistent Carbohydrate/No Snacks (09-12-19 @ 17:18)  PO intake:  < 50% [ ] 50-75% [ ]   % [x]  other :  Current Weight: 68kg (9/12)    __________________ Pertinent Medications__________________   MEDICATIONS  (STANDING):  cilostazol 50 milliGRAM(s) Oral two times a day  ciprofloxacin   IVPB 400 milliGRAM(s) IV Intermittent every 12 hours  dextrose 5%. 1000 milliLiter(s) (50 mL/Hr) IV Continuous <Continuous>  dextrose 50% Injectable 12.5 Gram(s) IV Push once  dextrose 50% Injectable 25 Gram(s) IV Push once  dextrose 50% Injectable 25 Gram(s) IV Push once  ergocalciferol 51211 Unit(s) Oral <User Schedule>  influenza   Vaccine 0.5 milliLiter(s) IntraMuscular once  insulin glargine Injectable (LANTUS) 8 Unit(s) SubCutaneous at bedtime  insulin lispro (HumaLOG) corrective regimen sliding scale   SubCutaneous three times a day before meals  insulin lispro (HumaLOG) corrective regimen sliding scale   SubCutaneous at bedtime  insulin lispro Injectable (HumaLOG) 3 Unit(s) SubCutaneous three times a day before meals  sodium chloride 0.9%. 1000 milliLiter(s) (30 mL/Hr) IV Continuous <Continuous>  vancomycin  IVPB 1000 milliGRAM(s) IV Intermittent every 12 hours    MEDICATIONS  (PRN):  acetaminophen   Tablet .. 650 milliGRAM(s) Oral every 6 hours PRN Mild Pain (1 - 3)  dextrose 40% Gel 15 Gram(s) Oral once PRN Blood Glucose LESS THAN 70 milliGRAM(s)/deciliter  glucagon  Injectable 1 milliGRAM(s) IntraMuscular once PRN Glucose LESS THAN 70 milligrams/deciliter  oxyCODONE    5 mG/acetaminophen 325 mG 1 Tablet(s) Oral every 4 hours PRN Moderate Pain (4 - 6)    __________________ Pertinent Labs__________________    09-18 Phos 3.1 mg/dL 09-10 [id number] 11.9 %<H>  CAPILLARY BLOOD GLUCOSE  POCT Blood Glucose.: 157 mg/dL (23 Sep 2019 12:09)  POCT Blood Glucose.: 148 mg/dL (23 Sep 2019 08:21)  POCT Blood Glucose.: 162 mg/dL (22 Sep 2019 21:23)  POCT Blood Glucose.: 140 mg/dL (22 Sep 2019 20:16)  POCT Blood Glucose.: 213 mg/dL (22 Sep 2019 17:14)    Skin: surgical incision left foot, no pressure injuries  Edema: No edema noted.    Estimated Needs:   [x] no change since previous assessment  [ ] recalculated:     Previous Nutrition Diagnosis:   [x] Altered Nutrition Related Lab Values   Nutrition Diagnosis is [x] ongoing  [ ] resolved [ ] not applicable     Interventions:   1. Continue Consistent Carbohydrate (no snacks) diet.   2. Suggest outpatient follow up with an endocrinologist to ensure long-term DM diet comprehension and compliance.      Monitoring and Evaluation:  1. Patient to meet > 75% estimated pro/kcal needs.   2. Maintain blood glucose control.   RD to remain available, Anna Yrabrough RD, CDN Pager #[phone] Pelvic pain in female

## 2019-11-26 ENCOUNTER — APPOINTMENT (OUTPATIENT)
Dept: ENDOCRINOLOGY | Facility: CLINIC | Age: 39
End: 2019-11-26

## 2021-05-20 ENCOUNTER — INPATIENT (INPATIENT)
Facility: HOSPITAL | Age: 41
LOS: 5 days | Discharge: HOME | DRG: 853 | End: 2021-05-26
Attending: INTERNAL MEDICINE | Admitting: INTERNAL MEDICINE
Payer: COMMERCIAL

## 2021-05-20 ENCOUNTER — TRANSCRIPTION ENCOUNTER (OUTPATIENT)
Age: 41
End: 2021-05-20

## 2021-05-20 VITALS
TEMPERATURE: 98 F | HEIGHT: 66 IN | WEIGHT: 160.06 LBS | DIASTOLIC BLOOD PRESSURE: 72 MMHG | RESPIRATION RATE: 18 BRPM | OXYGEN SATURATION: 99 % | HEART RATE: 104 BPM | SYSTOLIC BLOOD PRESSURE: 145 MMHG

## 2021-05-20 DIAGNOSIS — L98.499 NON-PRESSURE CHRONIC ULCER OF SKIN OF OTHER SITES WITH UNSPECIFIED SEVERITY: ICD-10-CM

## 2021-05-20 DIAGNOSIS — A41.9 SEPSIS, UNSPECIFIED ORGANISM: ICD-10-CM

## 2021-05-20 DIAGNOSIS — Z89.422 ACQUIRED ABSENCE OF OTHER LEFT TOE(S): Chronic | ICD-10-CM

## 2021-05-20 DIAGNOSIS — S98.132A COMPLETE TRAUMATIC AMPUTATION OF ONE LEFT LESSER TOE, INITIAL ENCOUNTER: Chronic | ICD-10-CM

## 2021-05-20 DIAGNOSIS — E11.8 TYPE 2 DIABETES MELLITUS WITH UNSPECIFIED COMPLICATIONS: ICD-10-CM

## 2021-05-20 DIAGNOSIS — N17.9 ACUTE KIDNEY FAILURE, UNSPECIFIED: ICD-10-CM

## 2021-05-20 DIAGNOSIS — R09.89 OTHER SPECIFIED SYMPTOMS AND SIGNS INVOLVING THE CIRCULATORY AND RESPIRATORY SYSTEMS: ICD-10-CM

## 2021-05-20 DIAGNOSIS — R63.8 OTHER SYMPTOMS AND SIGNS CONCERNING FOOD AND FLUID INTAKE: ICD-10-CM

## 2021-05-20 LAB
A1C WITH ESTIMATED AVERAGE GLUCOSE RESULT: 8.5 % — HIGH (ref 4–5.6)
ALBUMIN SERPL ELPH-MCNC: 3.2 G/DL — LOW (ref 3.3–5)
ALP SERPL-CCNC: 70 U/L — SIGNIFICANT CHANGE UP (ref 40–120)
ALT FLD-CCNC: 13 U/L — SIGNIFICANT CHANGE UP (ref 10–45)
ANION GAP SERPL CALC-SCNC: 12 MMOL/L — SIGNIFICANT CHANGE UP (ref 5–17)
APTT BLD: 33.5 SEC — SIGNIFICANT CHANGE UP (ref 27.5–35.5)
AST SERPL-CCNC: 24 U/L — SIGNIFICANT CHANGE UP (ref 10–40)
B-OH-BUTYR SERPL-SCNC: 0.7 MMOL/L — HIGH
BASE EXCESS BLDV CALC-SCNC: 1.2 MMOL/L — SIGNIFICANT CHANGE UP (ref -2–3)
BASOPHILS # BLD AUTO: 0.03 K/UL — SIGNIFICANT CHANGE UP (ref 0–0.2)
BASOPHILS NFR BLD AUTO: 0.2 % — SIGNIFICANT CHANGE UP (ref 0–2)
BILIRUB SERPL-MCNC: 0.5 MG/DL — SIGNIFICANT CHANGE UP (ref 0.2–1.2)
BLD GP AB SCN SERPL QL: NEGATIVE — SIGNIFICANT CHANGE UP
BUN SERPL-MCNC: 28 MG/DL — HIGH (ref 7–23)
CALCIUM SERPL-MCNC: 8.2 MG/DL — LOW (ref 8.4–10.5)
CHLORIDE SERPL-SCNC: 102 MMOL/L — SIGNIFICANT CHANGE UP (ref 96–108)
CO2 BLDV-SCNC: 28 MMOL/L — HIGH (ref 22–26)
CO2 SERPL-SCNC: 25 MMOL/L — SIGNIFICANT CHANGE UP (ref 22–31)
CREAT SERPL-MCNC: 1.87 MG/DL — HIGH (ref 0.5–1.3)
CRP SERPL-MCNC: 269.5 MG/L — HIGH (ref 0–4)
D DIMER BLD IA.RAPID-MCNC: 379 NG/ML DDU — HIGH
EOSINOPHIL # BLD AUTO: 0 K/UL — SIGNIFICANT CHANGE UP (ref 0–0.5)
EOSINOPHIL NFR BLD AUTO: 0 % — SIGNIFICANT CHANGE UP (ref 0–6)
ERYTHROCYTE [SEDIMENTATION RATE] IN BLOOD: 120 MM/HR — HIGH
ESTIMATED AVERAGE GLUCOSE: 197 MG/DL — HIGH (ref 68–114)
GLUCOSE BLDC GLUCOMTR-MCNC: 154 MG/DL — HIGH (ref 70–99)
GLUCOSE BLDC GLUCOMTR-MCNC: 192 MG/DL — HIGH (ref 70–99)
GLUCOSE SERPL-MCNC: 168 MG/DL — HIGH (ref 70–99)
GRAM STN FLD: SIGNIFICANT CHANGE UP
HCO3 BLDV-SCNC: 27 MMOL/L — SIGNIFICANT CHANGE UP (ref 22–29)
HCT VFR BLD CALC: 33.2 % — LOW (ref 39–50)
HGB BLD-MCNC: 10.7 G/DL — LOW (ref 13–17)
IMM GRANULOCYTES NFR BLD AUTO: 0.5 % — SIGNIFICANT CHANGE UP (ref 0–1.5)
INR BLD: 1.33 — HIGH (ref 0.88–1.16)
LACTATE SERPL-SCNC: 1.2 MMOL/L — SIGNIFICANT CHANGE UP (ref 0.5–2)
LYMPHOCYTES # BLD AUTO: 1.1 K/UL — SIGNIFICANT CHANGE UP (ref 1–3.3)
LYMPHOCYTES # BLD AUTO: 8.5 % — LOW (ref 13–44)
MCHC RBC-ENTMCNC: 27.2 PG — SIGNIFICANT CHANGE UP (ref 27–34)
MCHC RBC-ENTMCNC: 32.2 GM/DL — SIGNIFICANT CHANGE UP (ref 32–36)
MCV RBC AUTO: 84.3 FL — SIGNIFICANT CHANGE UP (ref 80–100)
MONOCYTES # BLD AUTO: 1.08 K/UL — HIGH (ref 0–0.9)
MONOCYTES NFR BLD AUTO: 8.3 % — SIGNIFICANT CHANGE UP (ref 2–14)
NEUTROPHILS # BLD AUTO: 10.71 K/UL — HIGH (ref 1.8–7.4)
NEUTROPHILS NFR BLD AUTO: 82.5 % — HIGH (ref 43–77)
NRBC # BLD: 0 /100 WBCS — SIGNIFICANT CHANGE UP (ref 0–0)
PCO2 BLDV: 44 MMHG — SIGNIFICANT CHANGE UP (ref 42–55)
PH BLDV: 7.39 — SIGNIFICANT CHANGE UP (ref 7.32–7.43)
PLATELET # BLD AUTO: 277 K/UL — SIGNIFICANT CHANGE UP (ref 150–400)
PO2 BLDV: 36 MMHG — SIGNIFICANT CHANGE UP
POTASSIUM SERPL-MCNC: 3.8 MMOL/L — SIGNIFICANT CHANGE UP (ref 3.5–5.3)
POTASSIUM SERPL-SCNC: 3.8 MMOL/L — SIGNIFICANT CHANGE UP (ref 3.5–5.3)
PROT SERPL-MCNC: 7.9 G/DL — SIGNIFICANT CHANGE UP (ref 6–8.3)
PROTHROM AB SERPL-ACNC: 15.7 SEC — HIGH (ref 10.6–13.6)
RBC # BLD: 3.94 M/UL — LOW (ref 4.2–5.8)
RBC # FLD: 12.2 % — SIGNIFICANT CHANGE UP (ref 10.3–14.5)
RH IG SCN BLD-IMP: POSITIVE — SIGNIFICANT CHANGE UP
SAO2 % BLDV: 57.6 % — SIGNIFICANT CHANGE UP
SARS-COV-2 RNA SPEC QL NAA+PROBE: SIGNIFICANT CHANGE UP
SODIUM SERPL-SCNC: 139 MMOL/L — SIGNIFICANT CHANGE UP (ref 135–145)
SPECIMEN SOURCE: SIGNIFICANT CHANGE UP
WBC # BLD: 12.99 K/UL — HIGH (ref 3.8–10.5)
WBC # FLD AUTO: 12.99 K/UL — HIGH (ref 3.8–10.5)

## 2021-05-20 PROCEDURE — 93971 EXTREMITY STUDY: CPT | Mod: 26,RT

## 2021-05-20 PROCEDURE — 73630 X-RAY EXAM OF FOOT: CPT | Mod: 26,RT

## 2021-05-20 PROCEDURE — 99285 EMERGENCY DEPT VISIT HI MDM: CPT

## 2021-05-20 PROCEDURE — 71045 X-RAY EXAM CHEST 1 VIEW: CPT | Mod: 26

## 2021-05-20 PROCEDURE — 93010 ELECTROCARDIOGRAM REPORT: CPT

## 2021-05-20 PROCEDURE — 99223 1ST HOSP IP/OBS HIGH 75: CPT | Mod: GC

## 2021-05-20 RX ORDER — INSULIN GLARGINE 100 [IU]/ML
10 INJECTION, SOLUTION SUBCUTANEOUS AT BEDTIME
Refills: 0 | Status: DISCONTINUED | OUTPATIENT
Start: 2021-05-20 | End: 2021-05-26

## 2021-05-20 RX ORDER — HEPARIN SODIUM 5000 [USP'U]/ML
5000 INJECTION INTRAVENOUS; SUBCUTANEOUS EVERY 8 HOURS
Refills: 0 | Status: DISCONTINUED | OUTPATIENT
Start: 2021-05-20 | End: 2021-05-21

## 2021-05-20 RX ORDER — VANCOMYCIN HCL 1 G
1250 VIAL (EA) INTRAVENOUS ONCE
Refills: 0 | Status: COMPLETED | OUTPATIENT
Start: 2021-05-20 | End: 2021-05-20

## 2021-05-20 RX ORDER — ACETAMINOPHEN 500 MG
650 TABLET ORAL EVERY 6 HOURS
Refills: 0 | Status: DISCONTINUED | OUTPATIENT
Start: 2021-05-20 | End: 2021-05-26

## 2021-05-20 RX ORDER — INSULIN LISPRO 100/ML
5 VIAL (ML) SUBCUTANEOUS
Qty: 0 | Refills: 0 | DISCHARGE

## 2021-05-20 RX ORDER — PIPERACILLIN AND TAZOBACTAM 4; .5 G/20ML; G/20ML
3.38 INJECTION, POWDER, LYOPHILIZED, FOR SOLUTION INTRAVENOUS ONCE
Refills: 0 | Status: COMPLETED | OUTPATIENT
Start: 2021-05-20 | End: 2021-05-20

## 2021-05-20 RX ORDER — INSULIN GLARGINE 100 [IU]/ML
20 INJECTION, SOLUTION SUBCUTANEOUS
Qty: 0 | Refills: 0 | DISCHARGE

## 2021-05-20 RX ORDER — PIPERACILLIN AND TAZOBACTAM 4; .5 G/20ML; G/20ML
3.38 INJECTION, POWDER, LYOPHILIZED, FOR SOLUTION INTRAVENOUS EVERY 6 HOURS
Refills: 0 | Status: DISCONTINUED | OUTPATIENT
Start: 2021-05-20 | End: 2021-05-25

## 2021-05-20 RX ORDER — SODIUM CHLORIDE 9 MG/ML
1000 INJECTION INTRAMUSCULAR; INTRAVENOUS; SUBCUTANEOUS ONCE
Refills: 0 | Status: COMPLETED | OUTPATIENT
Start: 2021-05-20 | End: 2021-05-20

## 2021-05-20 RX ORDER — VANCOMYCIN HCL 1 G
1000 VIAL (EA) INTRAVENOUS EVERY 12 HOURS
Refills: 0 | Status: COMPLETED | OUTPATIENT
Start: 2021-05-21 | End: 2021-05-21

## 2021-05-20 RX ORDER — INSULIN LISPRO 100/ML
VIAL (ML) SUBCUTANEOUS
Refills: 0 | Status: DISCONTINUED | OUTPATIENT
Start: 2021-05-20 | End: 2021-05-26

## 2021-05-20 RX ADMIN — HEPARIN SODIUM 5000 UNIT(S): 5000 INJECTION INTRAVENOUS; SUBCUTANEOUS at 21:51

## 2021-05-20 RX ADMIN — Medication 2: at 22:00

## 2021-05-20 RX ADMIN — PIPERACILLIN AND TAZOBACTAM 200 GRAM(S): 4; .5 INJECTION, POWDER, LYOPHILIZED, FOR SOLUTION INTRAVENOUS at 17:30

## 2021-05-20 RX ADMIN — SODIUM CHLORIDE 1000 MILLILITER(S): 9 INJECTION INTRAMUSCULAR; INTRAVENOUS; SUBCUTANEOUS at 19:12

## 2021-05-20 RX ADMIN — PIPERACILLIN AND TAZOBACTAM 200 GRAM(S): 4; .5 INJECTION, POWDER, LYOPHILIZED, FOR SOLUTION INTRAVENOUS at 23:43

## 2021-05-20 RX ADMIN — Medication 166.67 MILLIGRAM(S): at 17:48

## 2021-05-20 RX ADMIN — Medication 650 MILLIGRAM(S): at 23:42

## 2021-05-20 RX ADMIN — INSULIN GLARGINE 10 UNIT(S): 100 INJECTION, SOLUTION SUBCUTANEOUS at 23:42

## 2021-05-20 NOTE — H&P ADULT - ASSESSMENT
42 y/o M with PMHx of type 2 IDDM, L foot osteomyelitis s/p L 1st partial ray (9/2019 @ [facility]) and L 4th digit amputation (2018 @ [place]), presents to [facility] with c/o worsening R great toe wound, found to have severe sepsis 2/2 wound infection.

## 2021-05-20 NOTE — ED PROVIDER NOTE - PHYSICAL EXAMINATION
GEN: Well appearing, well nourished, awake, alert, oriented to person, place, time/situation and in no apparent distress. NTAF  ENT: Airway patent, Nasal mucosa clear. Mouth with normal mucosa.  EYES: Clear bilaterally. PERRL, EOMI  RESPIRATORY: Breathing comfortably with normal RR. No W/C/R, no hypoxia or resp distress.  CARDIAC: Regular rate and rhythm, no M/R/G  ABDOMEN: Soft, nontender, +bowel sounds, no rebound, rigidity, or guarding.  MSK: Range of motion is not limited, R great toe infected ulcer with black eschar, purulent drainage, and surrounding cellulitis extending to foot and up leg. LLE swelling. Distal ext with +2 pulses, compartments soft, no ligamentous instability.  NEURO: Alert and oriented, no focal deficits.  SKIN: Skin normal color for race, warm, dry, infected diabetic foot ulcer to distal portion of right great toe.   PSYCH: Alert and oriented to person, place, time/situation. normal mood and affect. no apparent risk to self or others.

## 2021-05-20 NOTE — H&P ADULT - PROBLEM SELECTOR PLAN 1
History of type 2 IDDM, presenting with 1 week of worsening R great toe wound. , WBC 12.99, Cr of 1.87 (Cr 1.10 in 9/2019). Found to have a purulent diabetic ulcer with surrounding cellulitis. s/p 1L NS bolus, Vanc, Zosyn  - Continue Vancomycin 1 q12h, will check trough before 4th dose   - Continue Zosyn 4.5 q6h   - Follow up Lactate   - Follow up BCx, wound cx   - Follow up foot xray   - Podiatry team following; NPO at midnight with plan for OR tomorrow for R hallux amputation History of type 2 IDDM, presenting with 1 week of worsening R great toe wound. , WBC 12.99, Cr of 1.87 (Cr 1.10 in 9/2019). Lactate wnl. Found to have a purulent diabetic ulcer with surrounding cellulitis. s/p 1L NS bolus, Vanc, Zosyn in ED  - Continue Vancomycin 1g q12h, will check trough before 4th dose (5/22 AM)  - Continue Zosyn 3.375 q6h   - Follow up BCx, wound cx   - Follow up foot xray   - Podiatry team following; NPO at midnight with plan for OR tomorrow for R hallux amputation History of type 2 IDDM, presenting with 1 week of worsening R great toe wound. , WBC 12.99, Cr of 1.87 (Cr 1.10 in 9/2019). Lactate wnl. Found to have a purulent diabetic ulcer with surrounding cellulitis. CRP and ESR elevated. s/p 1L NS bolus, Vanc, Zosyn in ED  - Continue Vancomycin 1g q12h, will check trough before 4th dose (5/22 AM)  - Continue Zosyn 3.375 q6h   - Follow up BCx, wound cx   - Follow up foot xray   - Podiatry team following; NPO at midnight with plan for OR tomorrow for R hallux amputation History of type 2 IDDM, presenting with 1 week of worsening R great toe wound. , WBC 12.99, Cr of 1.87 (Cr 1.10 in 9/2019). Lactate wnl. Found to have a purulent diabetic ulcer with surrounding cellulitis. s/p 1L NS bolus, Vanc, Zosyn in ED  - Concern for OM given elevated ESR/CRP   - Continue Vancomycin 1g q12h, will check trough before 4th dose (5/22 AM)  - Continue Zosyn 3.375 q6h   - Follow up BCx, wound cx   - Follow up foot xray   - Podiatry team following; NPO at midnight with plan for OR tomorrow for R hallux amputation

## 2021-05-20 NOTE — H&P ADULT - HISTORY OF PRESENT ILLNESS
42 y/o M with PMHx of type 2 IDDM, L foot osteomyelitis s/p L 1st partial ray (9/2019 @ [facility]) and L 4th digit amputation (2018 @ [place]), presents to [facility] with c/o worsening R great toe wound. Patient admits to noticing R foot swelling and malodor 1 week ago. Denies loss of sensation, tingling, or pain in the area. He was concerned when his shoe didn't fit 2/2 swelling, with worsening odor which brought him to the ED today. Patient has been compliant with his insulin regimen, and has lowered his A1C from 14s to 8.7. He denies any recent follow up visits with Podiatry. Denies any injury to the foot, fevers/chills, weakness, SOB, chest pain.     ED Course:   Vitals: T98.3, , /72, 99% O2 on room air, RR 18   Labs s/f WBC 12.99, Hb 10.7, BUN/Cr 28/1.87, , BHB 0.7, A1C 8.5   CXR wnl. EKG showing sinus tachycardia without ischemic changes   Podiatry team consulted; recs to start broad abx with plan for OR tomorrow for R hallux amputation   Vanc and Zosyn given. s/p 1L NS ****INCOMPLETE****  42 y/o M with PMHx of type 2 IDDM, L foot osteomyelitis s/p L 1st partial ray (9/2019 @ [facility]) and L 4th digit amputation (2018 @ [place]), presents to [facility] with c/o worsening R great toe wound. Patient admits to noticing R foot swelling and malodor 1 week ago. Denies loss of sensation, tingling, or pain in the area. He was concerned when his shoe didn't fit 2/2 swelling, with worsening odor which brought him to the ED today. Patient has been compliant with his insulin regimen, and has lowered his A1C from 14s to 8.7. He denies any recent follow up visits with Podiatry. Denies any injury to the foot, fevers/chills, weakness, SOB, chest pain.     ED Course:   Vitals: T98.3, , /72, 99% O2 on room air, RR 18   Labs s/f WBC 12.99, Hb 10.7, BUN/Cr 28/1.87, , BHB 0.7, A1C 8.5   CXR wnl. EKG showing sinus tachycardia without ischemic changes   Podiatry team consulted; recs to start broad abx with plan for OR tomorrow for R hallux amputation   Vanc and Zosyn given. s/p 1L NS 40 y/o M with PMHx of type 2 IDDM, L foot osteomyelitis s/p L 1st partial ray (9/2019 @ [facility]) and L 4th digit amputation (2018 @ [place]), presents to [facility] with c/o worsening R great toe wound. Patient admits to noticing R foot swelling and malodor 1 week ago. Denies loss of sensation, tingling, or pain in the area. He was concerned when his shoe didn't fit 2/2 swelling, with worsening odor which brought him to the ED today. Patient has been compliant with his insulin regimen, and has lowered his A1C from 14s to 8.7. He denies any recent follow up visits with Podiatry. Denies any injury to the foot, fevers/chills, weakness, SOB, chest pain.     ED Course:   Vitals: T98.3, , /72, 99% O2 on room air, RR 18   Labs s/f WBC 12.99, Hb 10.7, BUN/Cr 28/1.87, , BHB 0.7, A1C 8.5   CXR wnl. EKG showing sinus tachycardia without ischemic changes   Podiatry team consulted; recs to start broad abx with plan for OR tomorrow for R hallux amputation   Vanc and Zosyn given. s/p 1L NS

## 2021-05-20 NOTE — H&P ADULT - PROBLEM SELECTOR PLAN 6
F: s/p 1L in ED  E: Replete prn  N: Renal diet, NPO after midnight   DVT: Heparin 5000 q8h  Dispo: RMF F: s/p 1L in ED  E: Replete prn  N: Carb consistent, NPO after midnight   DVT: Heparin 5000 q8h  Dispo: RMF

## 2021-05-20 NOTE — CONSULT NOTE ADULT - ASSESSMENT
40 yo M w/ PMHx Type 2 IDDM, L foot OM s/p L 1st partial ray (9/2019 @ [facility]) and L 4th digit amputation (2018 @ [place]) presents to [facility] ED c/o worsening R great toe wound a/w severe swelling and malodor of 1 week duration.    P:   Admit to Medicine   F/U final XR read    40 yo M w/ PMHx Type 2 IDDM, L foot OM s/p L 1st partial ray (9/2019 @ [facility]) and L 4th digit amputation (2018 @ [place]) presents to [facility] ED c/o worsening R great toe wound a/w severe swelling and malodor of 1 week duration.    P:   Admit to Medicine   Start broad spectrum IV abx  F/U final XR read   NPO at midnight  Possible surgery tomorrow afternoon - R hallux amputation   F/U surgical swab    40 yo M w/ PMHx Type 2 IDDM, L foot OM s/p L 1st partial ray (9/2019 @ [facility]) and L 4th digit amputation (2018 @ [place]) presents to [facility] ED c/o worsening R great toe wound a/w severe swelling and malodor of 1 week duration.    P:   Admit to Medicine   Start broad spectrum IV abx  F/U final XR read   F/U culture swab  NPO at midnight  Pt on OR add-on list for tomorrow 5/21  Plan d/w Attending Dr. Dumont  Podiatry following   40 yo M w/ PMHx Type 2 IDDM, L foot OM s/p L 1st partial ray (9/2019 @ [facility]) and L 4th digit amputation (2018 @ [place]) presents to [facility] ED c/o worsening R great toe wound a/w severe swelling and malodor of 1 week duration. Podiatry consulted for surgical mgmt of severely infected R great toe wound; requires amputation for trt of osteomyelitis of distal phalanx of R hallux.     P:   Admit to Medicine   Start broad spectrum IV abx  F/U final XR read   F/U culture swab  NPO at midnight  Pt on OR add-on list for tomorrow 5/21  Performed sharp excisional debridement of necrotic tissue at the distal tip of R great toe with sterile #10 blade; down to an including residual necrotic tissue. Pt tolerated debridement well w/out AE.   Please consult ID - need for long-term IV abx still TBD/pending operative outcome  Plan d/w Attending Dr. Dumont  Podiatry following

## 2021-05-20 NOTE — H&P ADULT - ATTENDING COMMENTS
-C/w Vanc + Zosyn for diabetic foot infection   -F/u BCx   -Swab obtained by Podiatry – f/u culture result   -NPO after MN for amputation (R Hallux) with Podiatry in the AM.   -US Doppler ordered for RLE swelling  -Home dose Lantus 1/2ed – 10u – resume home basal bolus regimen after OR.

## 2021-05-20 NOTE — H&P ADULT - NSICDXPASTSURGICALHX_GEN_ALL_CORE_FT
PAST SURGICAL HISTORY:  History of partial ray amputation of fourth toe of left foot     Toe amputation status, left

## 2021-05-20 NOTE — ED PROVIDER NOTE - CLINICAL SUMMARY MEDICAL DECISION MAKING FREE TEXT BOX
41M with a h/o DM on insulin, h/o previous left foot/toe infections and amputations who p/w worsening right toe diabetic ulcer with swelling and redness and associated found smelling drainage. He did not seek care earlier bc he was not feeling any pain.   On exam, VSS notable for borderline tachy, afebrile, purulent diabetic ulcer to right great toe with surrounding STS and cellulitis.   Infectious workup sent. IV abx. XR foot, LLE duplex to r/o dvt. Podiatry was consulted. Pt will require admission for IV abx and further mgmt. 41M with a h/o DM on insulin, h/o previous left foot/toe infections and amputations who p/w worsening right toe diabetic ulcer with swelling and redness and associated found smelling drainage. He did not seek care earlier bc he was not feeling any pain.   On exam, VSS notable for borderline tachy, afebrile, purulent diabetic ulcer to right great toe with surrounding STS and cellulitis.   Infectious workup sent. IV abx. XR foot, LLE duplex to r/o dvt. Podiatry was consulted. Pt will require admission for IV abx and further mgmt.    Podiatry saw patient and recommend admit to medicine, they will take to OR today or tomorrow pending R foot xray results.   LLE Duplex pending, admitting team aware.   Pt stable for RMF at this time.

## 2021-05-20 NOTE — ED PROVIDER NOTE - OBJECTIVE STATEMENT
41M with a h/o DM on insulin, h/o previous left foot/toe infections and amputations who p/w worsening right toe diabetic ulcer with swelling and redness and associated found smelling drainage. He did not seek care earlier bc he was not feeling any pain. He denies recently hospital admissions. He states he has been complaint with his medications, follows with his outpt PMD (not  affiliated) and denies having a podiatrist at this time. No f/c, no cp/sob, no n/v, no abd pain, no ha or neck pain, no dizziness or syncope, no other complaints. 41M with a h/o DM on insulin, h/o previous left foot/toe infections and amputations who p/w worsening right great toe diabetic ulcer with swelling and redness and associated found smelling drainage. He did not seek care earlier bc he was not feeling any pain. He denies recently hospital admissions. He states he has been complaint with his medications, follows with his outpt PMD (not  affiliated) and denies having a podiatrist at this time. No f/c, no cp/sob, no n/v, no abd pain, no ha or neck pain, no dizziness or syncope, no other complaints.

## 2021-05-20 NOTE — H&P ADULT - PROBLEM SELECTOR PLAN 3
Presenting with significant RLE swelling, with mild tenderness on exam.   - Follow up Ultrasound to r/o DVT Presenting with significant RLE swelling, with mild tenderness on exam.   - Follow up Ultrasound to r/o DVT    #Sinus tachycardia    on admission. EKG showing sinus rhythm. Denies any pain. Likely 2/2 infection and dehydration. s/p 1 L bolus. PE on the differential given RLE swelling, however no hypoxia. D-dimer and RLE duplex pending.   - Monitor vitals  - Control pain with Tylenol prn Presenting with significant RLE swelling, with mild tenderness on exam.   - Follow up Ultrasound to r/o DVT    #Sinus tachycardia    on admission. EKG showing sinus rhythm. Denies any pain. Likely 2/2 infection and dehydration. s/p 1 L bolus. PE on the differential given RLE swelling, however no hypoxia, D-dimer and RLE duplex pending.   - Monitor vitals  - Control pain with Tylenol prn

## 2021-05-20 NOTE — ED ADULT NURSE NOTE - NSSEPSISSUSPECTED_ED_A_ED
No Verbalized Understanding/Simple: Patient demonstrates quick and easy understanding/Patient asked questions

## 2021-05-20 NOTE — ED ADULT NURSE NOTE - NSIMPLEMENTINTERV_GEN_ALL_ED
Implemented All Fall Risk Interventions:  Phyllis to call system. Call bell, personal items and telephone within reach. Instruct patient to call for assistance. Room bathroom lighting operational. Non-slip footwear when patient is off stretcher. Physically safe environment: no spills, clutter or unnecessary equipment. Stretcher in lowest position, wheels locked, appropriate side rails in place. Provide visual cue, wrist band, yellow gown, etc. Monitor gait and stability. Monitor for mental status changes and reorient to person, place, and time. Review medications for side effects contributing to fall risk. Reinforce activity limits and safety measures with patient and family.

## 2021-05-20 NOTE — CONSULT NOTE ADULT - SUBJECTIVE AND OBJECTIVE BOX
Attending: Erika Dumont DPM     Patient is a 41y old  Male who presents with a chief complaint of     HPI: 40 yo M w/ PMHx Type 2 IDDM, L foot OM s/p L 1st partial ray (9/2019 @ [facility] and L 4th digit amputation presents to [facility] ED c/o worsening R great toe wound a/w redness, swelling and pain of 1wk duration.     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    Toe amputation status, left      Home Medications:  acetaminophen 325 mg oral tablet: 2 tab(s) orally every 6 hours, As needed, Mild Pain (1 - 3) (23 Sep 2019 11:54)  cilostazol 50 mg oral tablet: 1 tab(s) orally 2 times a day (23 Sep 2019 11:54)  insulin glargine: 8 unit(s) subcutaneous once a day (at bedtime) (23 Sep 2019 11:54)    Allergies    No Known Allergies    Intolerances      FAMILY HISTORY:  No pertinent family history in first degree relatives      Social History:       LABS              Vital Signs Last 24 Hrs  T(C): 36.8 (20 May 2021 16:30), Max: 36.8 (20 May 2021 16:30)  T(F): 98.3 (20 May 2021 16:30), Max: 98.3 (20 May 2021 16:30)  HR: 104 (20 May 2021 16:30) (104 - 104)  BP: 145/72 (20 May 2021 16:30) (145/72 - 145/72)  BP(mean): --  RR: 18 (20 May 2021 16:30) (18 - 18)  SpO2: 99% (20 May 2021 16:30) (99% - 99%)    PHYSICAL EXAM  General: NAD, AA0x3    Lower Extremity Focused:  Vasc:  Derm:  Neuro:  MSK:     RADIOLOGY                       Attending: Erika Dumont DPM     Patient is a 41y old  Male who presents with a chief complaint of R great toe wound     HPI: 42 yo M w/ PMHx Type 2 IDDM, L foot OM s/p L 1st partial ray (9/2019 @ [facility]) and L 4th digit amputation (2018 @ [place]) presents to [facility] ED c/o worsening R great toe wound a/w severe swelling and malodor of 1 week duration. Pt has not followed up w/ a podiatrist.     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       LABS              Vital Signs Last 24 Hrs  T(C): 36.8 (20 May 2021 16:30), Max: 36.8 (20 May 2021 16:30)  T(F): 98.3 (20 May 2021 16:30), Max: 98.3 (20 May 2021 16:30)  HR: 104 (20 May 2021 16:30) (104 - 104)  BP: 145/72 (20 May 2021 16:30) (145/72 - 145/72)  BP(mean): --  RR: 18 (20 May 2021 16:30) (18 - 18)  SpO2: 99% (20 May 2021 16:30) (99% - 99%)    PHYSICAL EXAM  General: NAD, AA0x3    Lower Extremity Focused:  Vasc:  Derm: Right great hallucal nail and nail bed are . Skin denuded off of distal tip of R great toe. 2cm x 2cm necrotic region at plantar aspect of R great toe.   Neuro:  MSK:     RADIOLOGY                       Attending: Erika Dumont DPM     Patient is a 41y old  Male who presents with a chief complaint of R great toe wound     HPI: 40 yo M w/ PMHx Type 2 IDDM, L foot OM s/p L 1st partial ray (9/2019 @ [facility]) and L 4th digit amputation (2018 @ [place]) presents to [facility] ED c/o worsening R great toe wound a/w severe swelling and malodor of 1 week duration. Pt has not followed up w/ a podiatrist. Pt states that he saw his PCP earlier this month, states that his legs/feet are usually evaluated during those visits. States that his last A1c during that visit was 8.9% but that in the past it was much higher. Endorses compliance w/ insulin. Denies taking any other medications.     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 Denies alcohol use, recreational drug use, and use of tobacco products.     LABS:                        10.7   12.99 )-----------( 277      ( 20 May 2021 17:03 )             33.2     05-20    139  |  102  |  28<H>  ----------------------------<  168<H>  3.8   |  25  |  1.87<H>    Ca    8.2<L>      20 May 2021 17:03    TPro  7.9  /  Alb  3.2<L>  /  TBili  0.5  /  DBili  x   /  AST  24  /  ALT  13  /  AlkPhos  70  05-20    PT/INR - ( 20 May 2021 17:03 )   PT: 15.7 sec;   INR: 1.33          PTT - ( 20 May 2021 17:03 )  PTT:33.5 sec      Vital Signs Last 24 Hrs  T(C): 36.8 (20 May 2021 16:30), Max: 36.8 (20 May 2021 16:30)  T(F): 98.3 (20 May 2021 16:30), Max: 98.3 (20 May 2021 16:30)  HR: 104 (20 May 2021 16:30) (104 - 104)  BP: 145/72 (20 May 2021 16:30) (145/72 - 145/72)  BP(mean): --  RR: 18 (20 May 2021 16:30) (18 - 18)  SpO2: 99% (20 May 2021 16:30) (99% - 99%)    PHYSICAL EXAM  General: NAD, AA0x3    Lower Extremity Focused:  Vasc: DP 2/4 B/L. L PT 2/4, R PT 1/4 - difficult to palpate pulses on RLE 2/2 severe edema. Increased warm to R foota nd lower leg. Cap refill brisk x 8.   Derm: Right great hallucal nail and nail bed are . Skin denuded off of distal tip of R great toe. 2cm x 2cm necrotic region at plantar aspect of R great toe.   Neuro: Protective sensation intact b/l  MSK: S/P L partial 1st ray amp, L 4th digit amp    RADIOLOGY  Resident Wet Read, R Foot XR 5/20/21: Extensive erosive changes to distal phalanx of R great toe w/ marked ST swelling of R foot.                        Attending: Erika Dumont DPM     Patient is a 41y old  Male who presents with a chief complaint of R great toe wound     HPI: 42 yo M w/ PMHx Type 2 IDDM, L foot OM s/p L 1st partial ray (9/2019 @ [facility]) and L 4th digit amputation (2018 @ [place]) presents to [facility] ED c/o worsening R great toe wound a/w severe swelling and malodor of 1 week duration. Pt has not followed up w/ a podiatrist. Pt states that he saw his PCP earlier this month, states that his legs/feet are usually evaluated during those visits. States that his last A1c during that visit was 8.9% but that in the past it was much higher. Endorses compliance w/ insulin. Denies taking any other medications. Upon further questioning, pt states that his right great toe was fine about a week ago but he grew concerned when he wasn't able to fit into his shoes because of the right foot swelling.     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 Denies alcohol use, recreational drug use, and use of tobacco products.     LABS:                        10.7   12.99 )-----------( 277      ( 20 May 2021 17:03 )             33.2     05-20    139  |  102  |  28<H>  ----------------------------<  168<H>  3.8   |  25  |  1.87<H>    Ca    8.2<L>      20 May 2021 17:03    TPro  7.9  /  Alb  3.2<L>  /  TBili  0.5  /  DBili  x   /  AST  24  /  ALT  13  /  AlkPhos  70  05-20    PT/INR - ( 20 May 2021 17:03 )   PT: 15.7 sec;   INR: 1.33          PTT - ( 20 May 2021 17:03 )  PTT:33.5 sec      Vital Signs Last 24 Hrs  T(C): 36.8 (20 May 2021 16:30), Max: 36.8 (20 May 2021 16:30)  T(F): 98.3 (20 May 2021 16:30), Max: 98.3 (20 May 2021 16:30)  HR: 104 (20 May 2021 16:30) (104 - 104)  BP: 145/72 (20 May 2021 16:30) (145/72 - 145/72)  BP(mean): --  RR: 18 (20 May 2021 16:30) (18 - 18)  SpO2: 99% (20 May 2021 16:30) (99% - 99%)    PHYSICAL EXAM  General: NAD, AA0x3    Lower Extremity Focused:  Vasc: DP 2/4 B/L. L PT 2/4, R PT 1/4 - difficult to palpate pulses on RLE 2/2 severe edema. Increased warm to R foota nd lower leg. Cap refill brisk x 8.   Derm: Right great hallucal nail and nail bed are . Skin denuded off of distal tip of R great toe. 2cm x 2cm black necrotic region at plantar aspect of R great toe. Strong malodor. <3cc of sero-purulent drainage expressed from R great toe wound. No margination/tunneling; does not probe to bone.    Neuro: Protective sensation intact b/l  MSK: S/P L partial 1st ray amp, L 4th digit amp    RADIOLOGY  Resident Wet Read, R Foot XR 5/20/21: Extensive erosive changes to distal phalanx of R great toe w/ marked ST swelling of R foot.

## 2021-05-20 NOTE — H&P ADULT - PROBLEM SELECTOR PLAN 4
Extensive history of type 2 IDDM with previous L foot/toe infections and amputations. Admits to insulin compliance; on Basaglar 20 units and Admelog 5units TID. Previously with A1C in 14s  - A1C 8.5 this admission  - Continue Lantus 10 units, will be NPO after midnight. Hold premeal Insulin.   - ISS, FS Extensive history of type 2 IDDM with previous L foot/toe infections and amputations. Admits to insulin compliance; on Basaglar 20 units and Admelog 5units TID. Previously with A1C in 14s  - BHB slightly elevated to 0.7, however without significant hyperglycemia, no acidosis   - A1C 8.5 this admission  - Continue Lantus 10 units, will be NPO after midnight. Hold pre meal Insulin.   - ISS, FS

## 2021-05-20 NOTE — H&P ADULT - NSHPLABSRESULTS_GEN_ALL_CORE
LABS:                       10.7   12.99 )-----------( 277      ( 20 May 2021 17:03 )             33.2     05-20    139  |  102  |  28<H>  ----------------------------<  168<H>  3.8   |  25  |  1.87<H>    Ca    8.2<L>      20 May 2021 17:03    TPro  7.9  /  Alb  3.2<L>  /  TBili  0.5  /  DBili  x   /  AST  24  /  ALT  13  /  AlkPhos  70  05-20    PT/INR - ( 20 May 2021 17:03 )   PT: 15.7 sec;   INR: 1.33          PTT - ( 20 May 2021 17:03 )  PTT:33.5 sec

## 2021-05-20 NOTE — H&P ADULT - NSHPPHYSICALEXAM_GEN_ALL_CORE
VITALS:   T(C): 36.8 (05-20-21 @ 16:30), Max: 36.8 (05-20-21 @ 16:30)  HR: 104 (05-20-21 @ 16:30) (104 - 104)  BP: 145/72 (05-20-21 @ 16:30) (145/72 - 145/72)  RR: 18 (05-20-21 @ 16:30) (18 - 18)  SpO2: 99% (05-20-21 @ 16:30) (99% - 99%)    PHYSICAL EXAM:  GENERAL: Laying in bed, no acute distress   HEAD:  Atraumatic, Normocephalic  EYES: EOMI, PERRLA, conjunctiva and sclera clear  ENMT: No tonsillar erythema, exudates, or enlargement; MMM  NECK: Supple, No JVD  HEART: Tachycardic, regular rhythm; No murmurs, rubs, or gallops  RESPIRATORY: Unlabored respirations. CTA B/L, No W/R/R  ABDOMEN: Soft, Nontender, Nondistended; Bowel sounds present  NEUROLOGY: A&Ox3, nonfocal, moving all extremities  EXTREMITIES: Distal pulses present. R leg significantly more swollen than L leg. Mild calf tenderness to palpation. No erythema. Both LE warm to touch. R foot wrapped in Kerlix. Per Podiatry's assessment: Right great hallucal nail and nail bed are . Skin denuded off of distal tip of R great toe. 2cm x 2cm necrotic region at plantar aspect of R great toe.

## 2021-05-21 ENCOUNTER — RESULT REVIEW (OUTPATIENT)
Age: 41
End: 2021-05-21

## 2021-05-21 LAB
ALBUMIN SERPL ELPH-MCNC: 2.5 G/DL — LOW (ref 3.3–5)
ALP SERPL-CCNC: 60 U/L — SIGNIFICANT CHANGE UP (ref 40–120)
ALT FLD-CCNC: 11 U/L — SIGNIFICANT CHANGE UP (ref 10–45)
ANION GAP SERPL CALC-SCNC: 11 MMOL/L — SIGNIFICANT CHANGE UP (ref 5–17)
APPEARANCE UR: ABNORMAL
APTT BLD: 32.7 SEC — SIGNIFICANT CHANGE UP (ref 27.5–35.5)
AST SERPL-CCNC: 20 U/L — SIGNIFICANT CHANGE UP (ref 10–40)
BACTERIA # UR AUTO: PRESENT /HPF
BASOPHILS # BLD AUTO: 0.03 K/UL — SIGNIFICANT CHANGE UP (ref 0–0.2)
BASOPHILS NFR BLD AUTO: 0.3 % — SIGNIFICANT CHANGE UP (ref 0–2)
BILIRUB SERPL-MCNC: 0.4 MG/DL — SIGNIFICANT CHANGE UP (ref 0.2–1.2)
BILIRUB UR-MCNC: NEGATIVE — SIGNIFICANT CHANGE UP
BUN SERPL-MCNC: 26 MG/DL — HIGH (ref 7–23)
CALCIUM SERPL-MCNC: 7.8 MG/DL — LOW (ref 8.4–10.5)
CHLORIDE SERPL-SCNC: 103 MMOL/L — SIGNIFICANT CHANGE UP (ref 96–108)
CO2 SERPL-SCNC: 23 MMOL/L — SIGNIFICANT CHANGE UP (ref 22–31)
COLOR SPEC: YELLOW — SIGNIFICANT CHANGE UP
COVID-19 SPIKE DOMAIN AB INTERP: POSITIVE
COVID-19 SPIKE DOMAIN ANTIBODY RESULT: >250 U/ML — HIGH
CREAT SERPL-MCNC: 1.78 MG/DL — HIGH (ref 0.5–1.3)
DIFF PNL FLD: ABNORMAL
EOSINOPHIL # BLD AUTO: 0 K/UL — SIGNIFICANT CHANGE UP (ref 0–0.5)
EOSINOPHIL NFR BLD AUTO: 0 % — SIGNIFICANT CHANGE UP (ref 0–6)
EPI CELLS # UR: ABNORMAL /HPF (ref 0–5)
GLUCOSE BLDC GLUCOMTR-MCNC: 113 MG/DL — HIGH (ref 70–99)
GLUCOSE BLDC GLUCOMTR-MCNC: 130 MG/DL — HIGH (ref 70–99)
GLUCOSE BLDC GLUCOMTR-MCNC: 156 MG/DL — HIGH (ref 70–99)
GLUCOSE BLDC GLUCOMTR-MCNC: 160 MG/DL — HIGH (ref 70–99)
GLUCOSE BLDC GLUCOMTR-MCNC: 170 MG/DL — HIGH (ref 70–99)
GLUCOSE SERPL-MCNC: 176 MG/DL — HIGH (ref 70–99)
GLUCOSE UR QL: NEGATIVE — SIGNIFICANT CHANGE UP
GRAM STN FLD: SIGNIFICANT CHANGE UP
GRAN CASTS # UR COMP ASSIST: ABNORMAL /LPF
HCT VFR BLD CALC: 30.2 % — LOW (ref 39–50)
HGB BLD-MCNC: 9.9 G/DL — LOW (ref 13–17)
IMM GRANULOCYTES NFR BLD AUTO: 0.7 % — SIGNIFICANT CHANGE UP (ref 0–1.5)
INR BLD: 1.32 — HIGH (ref 0.88–1.16)
KETONES UR-MCNC: ABNORMAL MG/DL
LEUKOCYTE ESTERASE UR-ACNC: NEGATIVE — SIGNIFICANT CHANGE UP
LYMPHOCYTES # BLD AUTO: 0.78 K/UL — LOW (ref 1–3.3)
LYMPHOCYTES # BLD AUTO: 7 % — LOW (ref 13–44)
MAGNESIUM SERPL-MCNC: 1.8 MG/DL — SIGNIFICANT CHANGE UP (ref 1.6–2.6)
MCHC RBC-ENTMCNC: 28 PG — SIGNIFICANT CHANGE UP (ref 27–34)
MCHC RBC-ENTMCNC: 32.8 GM/DL — SIGNIFICANT CHANGE UP (ref 32–36)
MCV RBC AUTO: 85.3 FL — SIGNIFICANT CHANGE UP (ref 80–100)
MONOCYTES # BLD AUTO: 0.81 K/UL — SIGNIFICANT CHANGE UP (ref 0–0.9)
MONOCYTES NFR BLD AUTO: 7.2 % — SIGNIFICANT CHANGE UP (ref 2–14)
NEUTROPHILS # BLD AUTO: 9.52 K/UL — HIGH (ref 1.8–7.4)
NEUTROPHILS NFR BLD AUTO: 84.8 % — HIGH (ref 43–77)
NITRITE UR-MCNC: NEGATIVE — SIGNIFICANT CHANGE UP
NRBC # BLD: 0 /100 WBCS — SIGNIFICANT CHANGE UP (ref 0–0)
PH UR: 6 — SIGNIFICANT CHANGE UP (ref 5–8)
PHOSPHATE SERPL-MCNC: 3 MG/DL — SIGNIFICANT CHANGE UP (ref 2.5–4.5)
PLATELET # BLD AUTO: 262 K/UL — SIGNIFICANT CHANGE UP (ref 150–400)
POTASSIUM SERPL-MCNC: 3.8 MMOL/L — SIGNIFICANT CHANGE UP (ref 3.5–5.3)
POTASSIUM SERPL-SCNC: 3.8 MMOL/L — SIGNIFICANT CHANGE UP (ref 3.5–5.3)
PROT SERPL-MCNC: 6.6 G/DL — SIGNIFICANT CHANGE UP (ref 6–8.3)
PROT UR-MCNC: >=300 MG/DL
PROTHROM AB SERPL-ACNC: 15.6 SEC — HIGH (ref 10.6–13.6)
RBC # BLD: 3.54 M/UL — LOW (ref 4.2–5.8)
RBC # FLD: 12.2 % — SIGNIFICANT CHANGE UP (ref 10.3–14.5)
RBC CASTS # UR COMP ASSIST: < 5 /HPF — SIGNIFICANT CHANGE UP
SARS-COV-2 IGG+IGM SERPL QL IA: >250 U/ML — HIGH
SARS-COV-2 IGG+IGM SERPL QL IA: POSITIVE
SODIUM SERPL-SCNC: 137 MMOL/L — SIGNIFICANT CHANGE UP (ref 135–145)
SP GR SPEC: 1.02 — SIGNIFICANT CHANGE UP (ref 1–1.03)
SPECIMEN SOURCE: SIGNIFICANT CHANGE UP
UROBILINOGEN FLD QL: 0.2 E.U./DL — SIGNIFICANT CHANGE UP
WBC # BLD: 11.22 K/UL — HIGH (ref 3.8–10.5)
WBC # FLD AUTO: 11.22 K/UL — HIGH (ref 3.8–10.5)
WBC UR QL: < 5 /HPF — SIGNIFICANT CHANGE UP

## 2021-05-21 PROCEDURE — 88304 TISSUE EXAM BY PATHOLOGIST: CPT | Mod: 26

## 2021-05-21 PROCEDURE — 73620 X-RAY EXAM OF FOOT: CPT | Mod: 26,RT

## 2021-05-21 PROCEDURE — 99222 1ST HOSP IP/OBS MODERATE 55: CPT

## 2021-05-21 PROCEDURE — 99233 SBSQ HOSP IP/OBS HIGH 50: CPT | Mod: GC

## 2021-05-21 RX ORDER — HYDROMORPHONE HYDROCHLORIDE 2 MG/ML
0.5 INJECTION INTRAMUSCULAR; INTRAVENOUS; SUBCUTANEOUS
Refills: 0 | Status: DISCONTINUED | OUTPATIENT
Start: 2021-05-21 | End: 2021-05-22

## 2021-05-21 RX ADMIN — Medication 2: at 17:49

## 2021-05-21 RX ADMIN — PIPERACILLIN AND TAZOBACTAM 200 GRAM(S): 4; .5 INJECTION, POWDER, LYOPHILIZED, FOR SOLUTION INTRAVENOUS at 16:50

## 2021-05-21 RX ADMIN — Medication 250 MILLIGRAM(S): at 17:45

## 2021-05-21 RX ADMIN — Medication 250 MILLIGRAM(S): at 04:54

## 2021-05-21 RX ADMIN — Medication 650 MILLIGRAM(S): at 01:36

## 2021-05-21 RX ADMIN — PIPERACILLIN AND TAZOBACTAM 200 GRAM(S): 4; .5 INJECTION, POWDER, LYOPHILIZED, FOR SOLUTION INTRAVENOUS at 12:44

## 2021-05-21 RX ADMIN — Medication 2: at 08:35

## 2021-05-21 RX ADMIN — Medication 650 MILLIGRAM(S): at 05:46

## 2021-05-21 RX ADMIN — INSULIN GLARGINE 10 UNIT(S): 100 INJECTION, SOLUTION SUBCUTANEOUS at 21:56

## 2021-05-21 RX ADMIN — Medication 650 MILLIGRAM(S): at 16:31

## 2021-05-21 RX ADMIN — Medication 650 MILLIGRAM(S): at 17:02

## 2021-05-21 RX ADMIN — PIPERACILLIN AND TAZOBACTAM 200 GRAM(S): 4; .5 INJECTION, POWDER, LYOPHILIZED, FOR SOLUTION INTRAVENOUS at 05:46

## 2021-05-21 RX ADMIN — Medication 650 MILLIGRAM(S): at 07:03

## 2021-05-21 RX ADMIN — HEPARIN SODIUM 5000 UNIT(S): 5000 INJECTION INTRAVENOUS; SUBCUTANEOUS at 05:46

## 2021-05-21 NOTE — CONSULT NOTE ADULT - ASSESSMENT
40 yo M with IDDM, prior h/o DFI L foot with necrotic ulcer R distal hallux with osteomyelitis.  Culture from drainage is growing Citrobacter.  Will likely also grow a Strep or Staph spp. at the least, as suggested by gram stain.  Amputation is planned.  If area of infection is completely removed, he will not need long term antibiotics.  Otherwise, he will need 6 wekes of antibiotics.  He has GO, now improving.  Would be careful with vancomycin  Suggest:  - Continue to f/u blood cultures from 5/20  - Continue to f/u drainage culture from 5/20  - Additional cultures in OR, including clean margin  - Continue vancomycin 1 g IV q12h - trough prior to dose tomorrow morning  - Continue pip-tazo 3.375 g IV q6h  Will follow with you for now - team 2.

## 2021-05-21 NOTE — PROGRESS NOTE ADULT - SUBJECTIVE AND OBJECTIVE BOX
INTERVAL HPI/OVERNIGHT EVENTS:  Patient was seen and examined at bedside. As per nurse and patient, no o/n events, patient resting comfortably. No complaints at this time. Patient denies: fever, chills, dizziness, weakness, HA, Changes in vision, CP, palpitations, SOB, cough, N/V/D/C, dysuria, changes in bowel movements, LE edema. ROS otherwise negative.    VITAL SIGNS:  T(F): 99.1 (05-21-21 @ 09:32)  HR: 88 (05-21-21 @ 09:32)  BP: 122/71 (05-21-21 @ 09:32)  RR: 18 (05-21-21 @ 09:32)  SpO2: 98% (05-21-21 @ 09:32)  Wt(kg): --    PHYSICAL EXAM:    Constitutional: WDWN, NAD  HEENT: PERRL, EOMI, sclera non-icteric, neck supple, trachea midline, no masses, no JVD, MMM, good dentition  Respiratory: CTA b/l, good air entry b/l, no wheezing, no rhonchi, no rales, without accessory muscle use and no intercostal retractions  Cardiovascular: RRR, normal S1S2, no M/R/G  Gastrointestinal: soft, NTND, no masses palpable, BS normal  Extremities: R- great toe wrapped in gauze, R leg warm and greater in size than L leg , Warm, well perfused, pulses equal bilateral upper and lower extremities, no edema, no clubbing. Capillary refill <2 sec  Neurological: AAOx3, CN Grossly intact  Skin: Normal temperature, warm, dry    MEDICATIONS  (STANDING):  heparin   Injectable 5000 Unit(s) SubCutaneous every 8 hours  insulin glargine Injectable (LANTUS) 10 Unit(s) SubCutaneous at bedtime  insulin lispro (ADMELOG) corrective regimen sliding scale   SubCutaneous Before meals and at bedtime  piperacillin/tazobactam IVPB.. 3.375 Gram(s) IV Intermittent every 6 hours  vancomycin  IVPB 1000 milliGRAM(s) IV Intermittent every 12 hours    MEDICATIONS  (PRN):  acetaminophen   Tablet .. 650 milliGRAM(s) Oral every 6 hours PRN Temp greater or equal to 38C (100.4F), Mild Pain (1 - 3), Moderate Pain (4 - 6)      Allergies    No Known Allergies    Intolerances        LABS:                        9.9    11.22 )-----------( 262      ( 21 May 2021 05:55 )             30.2     05-21    137  |  103  |  26<H>  ----------------------------<  176<H>  3.8   |  23  |  1.78<H>    Ca    7.8<L>      21 May 2021 05:55  Phos  3.0     05-21  Mg     1.8     05-21    TPro  6.6  /  Alb  2.5<L>  /  TBili  0.4  /  DBili  x   /  AST  20  /  ALT  11  /  AlkPhos  60  05-21    PT/INR - ( 21 May 2021 05:55 )   PT: 15.6 sec;   INR: 1.32          PTT - ( 21 May 2021 05:55 )  PTT:32.7 sec      RADIOLOGY & ADDITIONAL TESTS:  Reviewed

## 2021-05-21 NOTE — PROGRESS NOTE ADULT - PROBLEM SELECTOR PLAN 6
F: s/p 1L in ED  E: Replete prn  N: Carb consistent, NPO after midnight   DVT: Heparin 5000 q8h  Dispo: RMF F: s/p 1L in ED  E: Replete prn  N: NPO for surgery   DVT: Heparin 5000 q8h  Dispo: F

## 2021-05-21 NOTE — CONSULT NOTE ADULT - SUBJECTIVE AND OBJECTIVE BOX
HPI:  42 yo M with IDDM, h/o DFI s/p amputation L 4th toe 2018, L hallux partial ray (2019) admitted  for OM R great toe.  ID consult for antibiotic management.  Per Mr. Roblero, he was in his USOH until  when he felt his shoe rubbing on R hallux.  By , his toe and foot had swollen and he was unable to put his shoe on.  He noted malodor so he put his foot in a plastic bag.  No drainage, fever or chills.  Came to the ED last night, where he was initially afebrile.  Was seen by Podiatry, noted to have skin denuded at distal tip of R hallux with 2 X2 cm necrotic region and strong malodor.  3 cc of seropurulent drainage were expressed.  Labs were notable for WBC 12.99, H/H 10.7/33.2, BUN 28, Cr 1.9, ESR >120, .5, HgA1C 8.5.  Foot Xray showed fist distal phalangeal ST injury, swelling, gas, terminal tuft OM, ST vascular calcification.  He was started on vanc and pip-tazo and admitted for further management.  Overnight, he had Tm 103.1.  Podiatry plans for amputation today.  ID consult requested for antibiotic management.      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 3.375 Gram(s) IV Intermittent every 6 hours  vancomycin  IVPB 1000 milliGRAM(s) IV Intermittent every 12 hours    MEDICATIONS  (PRN):  acetaminophen   Tablet .. 650 milliGRAM(s) Oral every 6 hours PRN Temp greater or equal to 38C (100.4F), Mild Pain (1 - 3), Moderate Pain (4 - 6)      Allergies    No Known Allergies    Intolerances        SOCIAL HISTORY:  Born in [place], moved to [place] at age 12.  Single, lives with family members.  Does not work, previously worked in a warehouse.  No tobacco, alcohol or recreational drug use    FAMILY HISTORY:  No pertinent family history in first degree relatives    ROS:  No HA, photophobia, neck stiffness, rhinorrhea, sore throat, cough, CP, SOB, N, V, diarrhea, abd pain, dysuria, hematuria, frequency, muscle/joint symptoms, bruising/bleeding.    Vital Signs Last 24 Hrs  T(C): 37.3 (21 May 2021 09:32), Max: 39.5 (20 May 2021 22:54)  T(F): 99.1 (21 May 2021 09:32), Max: 103.1 (20 May 2021 22:54)  HR: 88 (21 May 2021 09:32) (88 - 108)  BP: 122/71 (21 May 2021 09:32) (119/63 - 145/72)  BP(mean): --  RR: 18 (21 May 2021 09:32) (16 - 18)  SpO2: 98% (21 May 2021 09:32) (98% - 100%)    PE:  WDWN in no distress  HEENT:  NC, PERRL, sclerae anicteric, bilateral conjunctival injection, EOMI.  Sinuses nontender, no nasal exudate.  No buccal or pharyngeal lesions, erythema or exudate  Neck:  Supple, no adenopathy  Lungs:  Clear to auscultation  Cor:  RRR, S1, S2, no murmur appreciated  Abd:  Symmetric, normoactive BS.  Soft, nontender, no masses, guarding or rebound.  Liver and spleen not enlarged  Extrem:  No cyanosis or edema.  RLE warm and edematous to below knee.  R foot wrapped by Podiatry.  Images viewed - large ulcer on tip of hallux with >50% necrotic ulcer.  Nail  from toe.  Small ulcer on 2nd toe.  Skin:  No rashes.    LABS:                        9.9    11.22 )-----------( 262      ( 21 May 2021 05:55 )             30.2     05-    137  |  103  |  26<H>  ----------------------------<  176<H>  3.8   |  23  |  1.78<H>    Ca    7.8<L>      21 May 2021 05:55  Phos  3.0       Mg     1.8         TPro  6.6  /  Alb  2.5<L>  /  TBili  0.4  /  DBili  x   /  AST  20  /  ALT  11  /  AlkPhos  60  05-21    Urinalysis Basic - ( 21 May 2021 13:33 )    Color: Yellow / Appearance: Cloudy / S.025 / pH: x  Gluc: x / Ketone: Trace mg/dL  / Bili: Negative / Urobili: 0.2 E.U./dL   Blood: x / Protein: >=300 mg/dL / Nitrite: NEGATIVE   Leuk Esterase: NEGATIVE / RBC: < 5 /HPF / WBC < 5 /HPF   Sq Epi: x / Non Sq Epi: 5-10 /HPF / Bacteria: Present /HPF        RADIOLOGY & ADDITIONAL STUDIES:

## 2021-05-21 NOTE — PROGRESS NOTE ADULT - ASSESSMENT
42 y/o M with PMHx of type 2 IDDM, L foot osteomyelitis s/p L 1st partial ray (9/2019 @ [facility]) and L 4th digit amputation (2018 @ [place]), presents to [facility] with c/o worsening R great toe wound, found to have severe sepsis 2/2 wound infection.

## 2021-05-21 NOTE — PROGRESS NOTE ADULT - PROBLEM SELECTOR PLAN 1
History of type 2 IDDM, presenting with 1 week of worsening R great toe wound. , WBC 12.99, Cr of 1.87 (Cr 1.10 in 9/2019). Lactate wnl. Found to have a purulent diabetic ulcer with surrounding cellulitis. s/p 1L NS bolus, Vanc, Zosyn in ED  - Concern for OM given elevated ESR/CRP   - Continue Vancomycin 1g q12h, will check trough before 4th dose (5/22 AM)  - Continue Zosyn 3.375 q6h   - Podiatry team following; 5pm R haullux biopsy potentially amputation  - Follow up bone bx  - ID consulted for Long term OM antibiotics mgmt

## 2021-05-21 NOTE — PROGRESS NOTE ADULT - SUBJECTIVE AND OBJECTIVE BOX
PRE-OP NOTE    Pre-Op Diagnosis: osteomyelitis of distal phalanx of right hallux  Planned Procedure: right hallux amputation   Scheduled Date / Time: May 21, 2021 after 4PM     Labs:                       9.9    11.22 )-----------( 262      ( 21 May 2021 05:55 )             30.2   05-21    137  |  103  |  26<H>  ----------------------------<  176<H>  3.8   |  23  |  1.78<H>    Ca    7.8<L>      21 May 2021 05:55  Phos  3.0     05-21  Mg     1.8     05-21    TPro  6.6  /  Alb  2.5<L>  /  TBili  0.4  /  DBili  x   /  AST  20  /  ALT  11  /  AlkPhos  60  05-21  LIVER FUNCTIONS - ( 21 May 2021 05:55 )  Alb: 2.5 g/dL / Pro: 6.6 g/dL / ALK PHOS: 60 U/L / ALT: 11 U/L / AST: 20 U/L / GGT: x           Vitals: Vital Signs Last 24 Hrs  T(C): 38.8 (21 May 2021 05:44), Max: 39.5 (20 May 2021 22:54)  T(F): 101.9 (21 May 2021 05:44), Max: 103.1 (20 May 2021 22:54)  HR: 99 (21 May 2021 05:44) (99 - 108)  BP: 119/63 (21 May 2021 05:44) (119/63 - 145/72)  BP(mean): --  RR: 18 (21 May 2021 05:44) (16 - 18)  SpO2: 98% (21 May 2021 05:44) (98% - 100%)  PE:   Official CXR reading: (On Chart)  Official EKG reading: (On Chart)  Type and Cross/Screen:   NPO after MN  Antibiotics:   Anesthesia evaluation (on chart)  Operative Consent (on chart)    PRE-OP NOTE    Pre-Op Diagnosis: osteomyelitis of distal phalanx of right hallux  Planned Procedure: right hallux amputation   Scheduled Date / Time: May 21, 2021 after 5PM     Labs:                       9.9    11.22 )-----------( 262      ( 21 May 2021 05:55 )             30.2   05-21    137  |  103  |  26<H>  ----------------------------<  176<H>  3.8   |  23  |  1.78<H>    Ca    7.8<L>      21 May 2021 05:55  Phos  3.0     05-21  Mg     1.8     05-21    TPro  6.6  /  Alb  2.5<L>  /  TBili  0.4  /  DBili  x   /  AST  20  /  ALT  11  /  AlkPhos  60  05-21  LIVER FUNCTIONS - ( 21 May 2021 05:55 )  Alb: 2.5 g/dL / Pro: 6.6 g/dL / ALK PHOS: 60 U/L / ALT: 11 U/L / AST: 20 U/L / GGT: x           Vitals: Vital Signs Last 24 Hrs  T(C): 38.8 (21 May 2021 05:44), Max: 39.5 (20 May 2021 22:54)  T(F): 101.9 (21 May 2021 05:44), Max: 103.1 (20 May 2021 22:54)  HR: 99 (21 May 2021 05:44) (99 - 108)  BP: 119/63 (21 May 2021 05:44) (119/63 - 145/72)  BP(mean): --  RR: 18 (21 May 2021 05:44) (16 - 18)  SpO2: 98% (21 May 2021 05:44) (98% - 100%)  PE:   Official CXR reading: (On Chart)  Official EKG reading: (On Chart)  Type and Cross/Screen:   NPO after MN    Anesthesia evaluation (on chart)  Operative Consent (on chart)

## 2021-05-21 NOTE — PROGRESS NOTE ADULT - PROBLEM SELECTOR PLAN 3
Presenting with significant RLE swelling, with mild tenderness on exam.   - Follow up Ultrasound to r/o DVT    #Sinus tachycardia    on admission. EKG showing sinus rhythm. Denies any pain. Likely 2/2 infection and dehydration. s/p 1 L bolus. PE on the differential given RLE swelling, however no hypoxia, D-dimer and RLE duplex pending.   - Monitor vitals  - Control pain with Tylenol prn

## 2021-05-21 NOTE — PROGRESS NOTE ADULT - ASSESSMENT
42 yo M w/ PMHx Type 2 IDDM, L foot OM s/p L 1st partial ray (9/2019 @ [facility]) and L 4th digit amputation (2018 @ [place]) presents to [facility] ED c/o worsening R great toe wound a/w severe swelling and malodor of 1 week duration. Podiatry consulted for surgical mgmt of severely infected R great toe wound; requires amputation for trt of osteomyelitis of distal phalanx of R hallux.  40 yo M w/ PMHx Type 2 IDDM, L foot OM s/p L 1st partial ray (9/2019 @ [facility]) and L 4th digit amputation (2018 @ [place]) presents to [facility] ED c/o worsening R great toe wound a/w severe swelling and malodor of 1 week duration. Podiatry consulted for surgical mgmt of severely infected R great toe wound; requires amputation for trt of osteomyelitis of distal phalanx of R hallux.     P:   NPO   OR after 5PM   Consent obtained, in chart

## 2021-05-21 NOTE — PROGRESS NOTE ADULT - PROBLEM SELECTOR PLAN 4
Extensive history of type 2 IDDM with previous L foot/toe infections and amputations. Admits to insulin compliance; on Basaglar 20 units and Admelog 5units TID. Previously with A1C in 14s  - BHB slightly elevated to 0.7, however without significant hyperglycemia, no acidosis   - A1C 8.5 this admission  - Continue Lantus 10 units, will be NPO after midnight. Hold pre meal Insulin.   - ISS, FS

## 2021-05-21 NOTE — CONSULT NOTE ADULT - PROVIDER SPECIALTY LIST ADULT
Podiatry
41 Y M with LYNN had cellulitis of the RLE 13 years ago, states that over the last 1.5 weeks, had stye on eye was using azithro ointment on eye, was having fevers and chills at the time. He states that on Friday he noticed that the RLE was swollen and painful to the touch, denies trauma, Sat night did lots of walking and noticed that leg was more red and swollen, throbbing. The fever and chills all resolved but he has been feeling generally achy and unwell. Denies nausea, vomiting.
Infectious Disease

## 2021-05-21 NOTE — PROGRESS NOTE ADULT - ASSESSMENT
40 yo M w/ PMHx Type 2 IDDM, L foot OM s/p L 1st partial ray (9/2019 @ [facility]) and L 4th digit amputation (2018 @ [place]) presents to [facility] ED c/o worsening R great toe wound a/w severe swelling and malodor of 1 week duration. Podiatry consulted for surgical mgmt of severely infected R great toe wound; requires amputation for trt of osteomyelitis of distal phalanx of R hallux. Pt is s/p amputation of right hallux w/ surgical site left open & packed w/ iodoform packing; approx 5cc purulence expressed.     P:   Continue IV abx   5/20 WCX  growing Citrobacter koseri  Pain control PRN   Continue all home meds/AC's  WBAT to R heel in surgical shoe   F/U intra-op deep WCX  Pt will likely RTOR early next week (Mon/Tues) for delayed primary closure of R hallux amp site  Podiatry following

## 2021-05-21 NOTE — PROGRESS NOTE ADULT - SUBJECTIVE AND OBJECTIVE BOX
POST OP NOTE    COLE FERNANDEZ  MRN-[id number]    Procedure: Right hallux amputation   Surgeon: Erika Dumont DPM  Assistants: Rula Higgins DPM (PGY1)    Patient tolerated procedure well without incident.  Patient transferred to PACU in stable condition.       PE / Post Op Check:       GEN: NAD, AAOx3, resting comfortably with pain controlled      LE Focused: CFT shows adequate perfusion b/l with no signs of ischemic compromise.  No strikethrough is appreciated on surgical dressings.  Dressings were dry, clean, and intact.  No neuromuscular deficits appreciated.

## 2021-05-21 NOTE — PROGRESS NOTE ADULT - PROBLEM SELECTOR PLAN 5
Cr of 1.87 this admission. Cr noted to be 1.10 in 9/2019. Likely 2/2 diabetic nephropathy vs sepsis vs poor po intake. s/p 1L bolus in ED  - Urine lytes  - Trend Cr in AM Cr of 1.87 this admission. Cr noted to be 1.10 in 9/2019. Likely 2/2 diabetic nephropathy vs sepsis vs poor po intake. s/p 1L bolus in ED  - Cr  - dialy BMP

## 2021-05-22 LAB
-  AMPICILLIN/SULBACTAM: SIGNIFICANT CHANGE UP
-  AMPICILLIN: SIGNIFICANT CHANGE UP
-  CEFAZOLIN: SIGNIFICANT CHANGE UP
-  CEFEPIME: SIGNIFICANT CHANGE UP
-  CEFTRIAXONE: SIGNIFICANT CHANGE UP
-  CIPROFLOXACIN: SIGNIFICANT CHANGE UP
-  ERTAPENEM: SIGNIFICANT CHANGE UP
-  GENTAMICIN: SIGNIFICANT CHANGE UP
-  PIPERACILLIN/TAZOBACTAM: SIGNIFICANT CHANGE UP
-  TOBRAMYCIN: SIGNIFICANT CHANGE UP
-  TRIMETHOPRIM/SULFAMETHOXAZOLE: SIGNIFICANT CHANGE UP
ANION GAP SERPL CALC-SCNC: 9 MMOL/L — SIGNIFICANT CHANGE UP (ref 5–17)
BASOPHILS # BLD AUTO: 0.03 K/UL — SIGNIFICANT CHANGE UP (ref 0–0.2)
BASOPHILS NFR BLD AUTO: 0.4 % — SIGNIFICANT CHANGE UP (ref 0–2)
BUN SERPL-MCNC: 20 MG/DL — SIGNIFICANT CHANGE UP (ref 7–23)
CALCIUM SERPL-MCNC: 7.4 MG/DL — LOW (ref 8.4–10.5)
CHLORIDE SERPL-SCNC: 102 MMOL/L — SIGNIFICANT CHANGE UP (ref 96–108)
CO2 SERPL-SCNC: 23 MMOL/L — SIGNIFICANT CHANGE UP (ref 22–31)
CREAT SERPL-MCNC: 1.62 MG/DL — HIGH (ref 0.5–1.3)
EOSINOPHIL # BLD AUTO: 0.06 K/UL — SIGNIFICANT CHANGE UP (ref 0–0.5)
EOSINOPHIL NFR BLD AUTO: 0.8 % — SIGNIFICANT CHANGE UP (ref 0–6)
GLUCOSE BLDC GLUCOMTR-MCNC: 141 MG/DL — HIGH (ref 70–99)
GLUCOSE BLDC GLUCOMTR-MCNC: 171 MG/DL — HIGH (ref 70–99)
GLUCOSE BLDC GLUCOMTR-MCNC: 181 MG/DL — HIGH (ref 70–99)
GLUCOSE BLDC GLUCOMTR-MCNC: 185 MG/DL — HIGH (ref 70–99)
GLUCOSE SERPL-MCNC: 226 MG/DL — HIGH (ref 70–99)
HCT VFR BLD CALC: 26.7 % — LOW (ref 39–50)
HGB BLD-MCNC: 8.7 G/DL — LOW (ref 13–17)
IMM GRANULOCYTES NFR BLD AUTO: 0.6 % — SIGNIFICANT CHANGE UP (ref 0–1.5)
LYMPHOCYTES # BLD AUTO: 0.75 K/UL — LOW (ref 1–3.3)
LYMPHOCYTES # BLD AUTO: 10.4 % — LOW (ref 13–44)
MAGNESIUM SERPL-MCNC: 1.8 MG/DL — SIGNIFICANT CHANGE UP (ref 1.6–2.6)
MCHC RBC-ENTMCNC: 27.6 PG — SIGNIFICANT CHANGE UP (ref 27–34)
MCHC RBC-ENTMCNC: 32.6 GM/DL — SIGNIFICANT CHANGE UP (ref 32–36)
MCV RBC AUTO: 84.8 FL — SIGNIFICANT CHANGE UP (ref 80–100)
METHOD TYPE: SIGNIFICANT CHANGE UP
MONOCYTES # BLD AUTO: 0.9 K/UL — SIGNIFICANT CHANGE UP (ref 0–0.9)
MONOCYTES NFR BLD AUTO: 12.4 % — SIGNIFICANT CHANGE UP (ref 2–14)
NEUTROPHILS # BLD AUTO: 5.46 K/UL — SIGNIFICANT CHANGE UP (ref 1.8–7.4)
NEUTROPHILS NFR BLD AUTO: 75.4 % — SIGNIFICANT CHANGE UP (ref 43–77)
NRBC # BLD: 0 /100 WBCS — SIGNIFICANT CHANGE UP (ref 0–0)
PHOSPHATE SERPL-MCNC: 2 MG/DL — LOW (ref 2.5–4.5)
PLATELET # BLD AUTO: 144 K/UL — LOW (ref 150–400)
POTASSIUM SERPL-MCNC: 3.7 MMOL/L — SIGNIFICANT CHANGE UP (ref 3.5–5.3)
POTASSIUM SERPL-SCNC: 3.7 MMOL/L — SIGNIFICANT CHANGE UP (ref 3.5–5.3)
RBC # BLD: 3.15 M/UL — LOW (ref 4.2–5.8)
RBC # FLD: 12.8 % — SIGNIFICANT CHANGE UP (ref 10.3–14.5)
SODIUM SERPL-SCNC: 134 MMOL/L — LOW (ref 135–145)
VANCOMYCIN TROUGH SERPL-MCNC: 15.1 UG/ML — SIGNIFICANT CHANGE UP (ref 10–20)
WBC # BLD: 7.24 K/UL — SIGNIFICANT CHANGE UP (ref 3.8–10.5)
WBC # FLD AUTO: 7.24 K/UL — SIGNIFICANT CHANGE UP (ref 3.8–10.5)

## 2021-05-22 PROCEDURE — 99232 SBSQ HOSP IP/OBS MODERATE 35: CPT

## 2021-05-22 PROCEDURE — 99233 SBSQ HOSP IP/OBS HIGH 50: CPT | Mod: GC

## 2021-05-22 RX ORDER — ENOXAPARIN SODIUM 100 MG/ML
40 INJECTION SUBCUTANEOUS EVERY 24 HOURS
Refills: 0 | Status: DISCONTINUED | OUTPATIENT
Start: 2021-05-22 | End: 2021-05-22

## 2021-05-22 RX ORDER — ATORVASTATIN CALCIUM 80 MG/1
20 TABLET, FILM COATED ORAL AT BEDTIME
Refills: 0 | Status: DISCONTINUED | OUTPATIENT
Start: 2021-05-22 | End: 2021-05-26

## 2021-05-22 RX ORDER — VANCOMYCIN HCL 1 G
1000 VIAL (EA) INTRAVENOUS EVERY 12 HOURS
Refills: 0 | Status: COMPLETED | OUTPATIENT
Start: 2021-05-22 | End: 2021-05-23

## 2021-05-22 RX ADMIN — ATORVASTATIN CALCIUM 20 MILLIGRAM(S): 80 TABLET, FILM COATED ORAL at 22:00

## 2021-05-22 RX ADMIN — PIPERACILLIN AND TAZOBACTAM 200 GRAM(S): 4; .5 INJECTION, POWDER, LYOPHILIZED, FOR SOLUTION INTRAVENOUS at 05:56

## 2021-05-22 RX ADMIN — Medication 2: at 13:07

## 2021-05-22 RX ADMIN — Medication 250 MILLIGRAM(S): at 17:27

## 2021-05-22 RX ADMIN — PIPERACILLIN AND TAZOBACTAM 200 GRAM(S): 4; .5 INJECTION, POWDER, LYOPHILIZED, FOR SOLUTION INTRAVENOUS at 00:57

## 2021-05-22 RX ADMIN — PIPERACILLIN AND TAZOBACTAM 200 GRAM(S): 4; .5 INJECTION, POWDER, LYOPHILIZED, FOR SOLUTION INTRAVENOUS at 22:00

## 2021-05-22 RX ADMIN — Medication 2: at 17:27

## 2021-05-22 RX ADMIN — INSULIN GLARGINE 10 UNIT(S): 100 INJECTION, SOLUTION SUBCUTANEOUS at 22:01

## 2021-05-22 RX ADMIN — Medication 250 MILLIGRAM(S): at 10:24

## 2021-05-22 RX ADMIN — PIPERACILLIN AND TAZOBACTAM 200 GRAM(S): 4; .5 INJECTION, POWDER, LYOPHILIZED, FOR SOLUTION INTRAVENOUS at 12:04

## 2021-05-22 RX ADMIN — Medication 2: at 09:16

## 2021-05-22 RX ADMIN — PIPERACILLIN AND TAZOBACTAM 200 GRAM(S): 4; .5 INJECTION, POWDER, LYOPHILIZED, FOR SOLUTION INTRAVENOUS at 17:27

## 2021-05-22 NOTE — PROGRESS NOTE ADULT - ASSESSMENT
42 yo M w/ PMHx Type 2 IDDM, L foot OM s/p L 1st partial ray (9/2019 @ [facility]) and L 4th digit amputation (2018 @ [place]) presents to [facility] ED c/o worsening R great toe wound a/w severe swelling and malodor of 1 week duration. Podiatry consulted for surgical mgmt of severely infected R great toe wound; requires amputation for trt of osteomyelitis of distal phalanx of R hallux. Pt is s/p amputation of right hallux w/ surgical site left open & packed w/ plain packing.    P:   Continue IV abx   5/20 WCX  growing Citrobacter koseri  Pain control PRN   WBAT to R heel in surgical shoe   F/U intra-op deep WCX  Pt will likely RTOR early next week (Mon/Tues) for delayed primary closure of R hallux amp site  Podiatry

## 2021-05-22 NOTE — PROGRESS NOTE ADULT - ASSESSMENT
40 yo M with IDDM, prior h/o DFI L foot with R hallux osteomyelitis s/p amputation 5/21.  Culture of drainage sent from ED now is growing Staph aureus and Citrobacter.  OR culture (apparently clean margin?) has NGTD.  Vancomycin trough was drawn a little late but is appropriate.  Suggest:  - Continue to f/u blood cultures from 5/20  - Continue to f/u OR and ED cultures  - Continue vancomycin 1 g IV q12h (goal trough:  14-17)  - Continue pip-tazo 3.375 g IV q6h  - Per Podiatry, will likely RTOR for delayed primary closure of amputation site.  Recommendations discussed with primary team.  Will follow with you - team 2.

## 2021-05-22 NOTE — PROGRESS NOTE ADULT - SUBJECTIVE AND OBJECTIVE BOX
Patient is a 41y old  Male who presents with a chief complaint of Diabetic wound (21 May 2021 22:06)      INTERVAL HPI/ OVERNIGHT EVENTS      LABS                        8.7    7.24  )-----------( 144      ( 22 May 2021 06:42 )             26.7     05-22    134<L>  |  102  |  20  ----------------------------<  226<H>  3.7   |  23  |  1.62<H>    Ca    7.4<L>      22 May 2021 06:42  Phos  2.0     05-22  Mg     1.8     05-22    TPro  6.6  /  Alb  2.5<L>  /  TBili  0.4  /  DBili  x   /  AST  20  /  ALT  11  /  AlkPhos  60  05-21    PT/INR - ( 21 May 2021 05:55 )   PT: 15.6 sec;   INR: 1.32          PTT - ( 21 May 2021 05:55 )  PTT:32.7 sec    ICU Vital Signs Last 24 Hrs  T(C): 36.8 (22 May 2021 08:48), Max: 38.4 (21 May 2021 16:26)  T(F): 98.3 (22 May 2021 08:48), Max: 101.1 (21 May 2021 16:26)  HR: 88 (22 May 2021 08:48) (88 - 105)  BP: 130/66 (22 May 2021 08:48) (105/60 - 135/66)  BP(mean): 87 (21 May 2021 22:10) (83 - 95)  ABP: --  ABP(mean): --  RR: 18 (22 May 2021 08:48) (17 - 18)  SpO2: 98% (22 May 2021 08:48) (97% - 100%)      RADIOLOGY    MICROBIOLOGY    PHYSICAL EXAM  Lower Extremity Focused  Vasc:  Derm:  Neuro:  MSK: Patient is a 41y old  Male who presents with a chief complaint of Diabetic wound (21 May 2021 22:06)      INTERVAL HPI/ OVERNIGHT EVENTS: AC O/N. Pt resting comfortably during AM rounds.       LABS                        8.7    7.24  )-----------( 144      ( 22 May 2021 06:42 )             26.7     05-22    134<L>  |  102  |  20  ----------------------------<  226<H>  3.7   |  23  |  1.62<H>    Ca    7.4<L>      22 May 2021 06:42  Phos  2.0     05-22  Mg     1.8     05-22    TPro  6.6  /  Alb  2.5<L>  /  TBili  0.4  /  DBili  x   /  AST  20  /  ALT  11  /  AlkPhos  60  05-21    PT/INR - ( 21 May 2021 05:55 )   PT: 15.6 sec;   INR: 1.32          PTT - ( 21 May 2021 05:55 )  PTT:32.7 sec    ICU Vital Signs Last 24 Hrs  T(C): 36.8 (22 May 2021 08:48), Max: 38.4 (21 May 2021 16:26)  T(F): 98.3 (22 May 2021 08:48), Max: 101.1 (21 May 2021 16:26)  HR: 88 (22 May 2021 08:48) (88 - 105)  BP: 130/66 (22 May 2021 08:48) (105/60 - 135/66)  BP(mean): 87 (21 May 2021 22:10) (83 - 95)  ABP: --  ABP(mean): --  RR: 18 (22 May 2021 08:48) (17 - 18)  SpO2: 98% (22 May 2021 08:48) (97% - 100%)      RADIOLOGY  < from: Xray Foot AP + Lateral, Right (05.21.21 @ 21:46) >  INTERPRETATION:  Clinical History: Postop    2 views of the right foot demonstrates patient to be status post fixation level of the first metatarsal phalangeal joint. No radiographic evidence of osteomyelitis noted.    IMPRESSION: Status post amputation right foot level of the first metatarsal phalangeal joint.    < end of copied text >      MICROBIOLOGY    Culture - Surgical Swab (collected 21 May 2021 21:27)  Source: .Surgical Swab Right Great Toe Wound Or Spec  Gram Stain (21 May 2021 22:07):    No organisms seen    Rare WBC's  Preliminary Report (22 May 2021 08:58):    No growth to date    Culture - Surgical Swab (collected 20 May 2021 19:15)  Source: .Surgical Swab R foot great toe wound  Gram Stain (20 May 2021 20:54):    Numerous Gram positive cocci in pairs    Few Gram Positive Rods    Rare to few White blood cells  Preliminary Report (21 May 2021 10:22):    Few Citrobacter koseri    Susceptibility to follow.  Organism: Citrobacter koseri (22 May 2021 13:47)  Organism: Citrobacter koseri (22 May 2021 13:47)      PHYSICAL EXAM  Lower Extremity Focused  Vasc: DP 2/4 B/L. L PT 2/4, R PT 1/4.  Derm: R hallux amputation site open, 2 retention sutures in place; packed w/ plain packing. No drainage/purulence expressed. No malodor.   Neuro: Protective sensation intact b/l  MSK: S/P L partial 1st ray amp, L 4th digit amp. S/P R hallux amputation.

## 2021-05-22 NOTE — PROGRESS NOTE ADULT - SUBJECTIVE AND OBJECTIVE BOX
INTERVAL HPI/OVERNIGHT EVENTS: AC O/N    SUBJECTIVE: Patient seen and examined at bedside.   POD1R hallux amputation  Pt denies any pain. Feels well. Hoping to avoid need for PICC and IV infusions. Denies any hematuria, melena, hematochezia.     OBJECTIVE:    VITAL SIGNS:  ICU Vital Signs Last 24 Hrs  T(C): 36.5 (22 May 2021 21:53), Max: 36.9 (22 May 2021 06:09)  T(F): 97.7 (22 May 2021 21:53), Max: 98.4 (22 May 2021 06:09)  HR: 81 (22 May 2021 21:53) (81 - 97)  BP: 120/67 (22 May 2021 21:53) (105/60 - 130/66)  BP(mean): --  ABP: --  ABP(mean): --  RR: 17 (22 May 2021 21:53) (17 - 18)  SpO2: 98% (22 May 2021 21:53) (98% - 99%)         @ 07:  -   @ 07:00  --------------------------------------------------------  IN: 200 mL / OUT: 300 mL / NET: -100 mL     @ 07:01  -  23 @ 01:01  --------------------------------------------------------  IN: 100 mL / OUT: 0 mL / NET: 100 mL      CAPILLARY BLOOD GLUCOSE      POCT Blood Glucose.: 141 mg/dL (22 May 2021 21:44)      PHYSICAL EXAM:  GEN: Male in NAD on RA  HEENT: NC/AT, MMM  CV: RRR, trace BLE edema  PULM: Nml effort, CTAB  ABD: Soft, NABS, non-tender  NEURO: A/O x3, moving all extremities  EXT: Dressing over R foot c/d/i. L foot w toe amputations    MEDICATIONS:  MEDICATIONS  (STANDING):  atorvastatin 20 milliGRAM(s) Oral at bedtime  insulin glargine Injectable (LANTUS) 10 Unit(s) SubCutaneous at bedtime  insulin lispro (ADMELOG) corrective regimen sliding scale   SubCutaneous Before meals and at bedtime  piperacillin/tazobactam IVPB.. 3.375 Gram(s) IV Intermittent every 6 hours  vancomycin  IVPB 1000 milliGRAM(s) IV Intermittent every 12 hours    MEDICATIONS  (PRN):  acetaminophen   Tablet .. 650 milliGRAM(s) Oral every 6 hours PRN Temp greater or equal to 38C (100.4F), Mild Pain (1 - 3), Moderate Pain (4 - 6)      ALLERGIES:  Allergies    No Known Allergies    Intolerances        LABS:                        8.7    7.24  )-----------( 144      ( 22 May 2021 06:42 )             26.7     05-    134<L>  |  102  |  20  ----------------------------<  226<H>  3.7   |  23  |  1.62<H>    Ca    7.4<L>      22 May 2021 06:42  Phos  2.0     05-  Mg     1.8     -    TPro  6.6  /  Alb  2.5<L>  /  TBili  0.4  /  DBili  x   /  AST  20  /  ALT  11  /  AlkPhos  60  05-21    PT/INR - ( 21 May 2021 05:55 )   PT: 15.6 sec;   INR: 1.32          PTT - ( 21 May 2021 05:55 )  PTT:32.7 sec  Urinalysis Basic - ( 21 May 2021 13:33 )    Color: Yellow / Appearance: Cloudy / S.025 / pH: x  Gluc: x / Ketone: Trace mg/dL  / Bili: Negative / Urobili: 0.2 E.U./dL   Blood: x / Protein: >=300 mg/dL / Nitrite: NEGATIVE   Leuk Esterase: NEGATIVE / RBC: < 5 /HPF / WBC < 5 /HPF   Sq Epi: x / Non Sq Epi: 5-10 /HPF / Bacteria: Present /HPF        RADIOLOGY & ADDITIONAL TESTS: Reviewed.

## 2021-05-22 NOTE — PROGRESS NOTE ADULT - SUBJECTIVE AND OBJECTIVE BOX
INTERVAL HPI/OVERNIGHT EVENTS:  s/p OR last night.  Tm 101.1 - he did not feel fever.    CONSTITUTIONAL:  No fever, chills, night sweats  EYES:  No photophobia or visual changes  CARDIOVASCULAR:  No chest pain  RESPIRATORY:  No cough, wheezing, or SOB   GASTROINTESTINAL:  No nausea, vomiting, diarrhea, constipation, or abdominal pain  GENITOURINARY:  No frequency, urgency, dysuria or hematuria  NEUROLOGIC:  No headache, lightheadedness      ANTIBIOTICS/RELEVANT:    Vancomycin 1 g IV q12h (-present)  Pip-tazo 3.375 g IV q6h (-present)      Vital Signs Last 24 Hrs  T(C): 36.8 (22 May 2021 08:48), Max: 38.4 (21 May 2021 16:26)  T(F): 98.3 (22 May 2021 08:48), Max: 101.1 (21 May 2021 16:26)  HR: 88 (22 May 2021 08:48) (88 - 105)  BP: 130/66 (22 May 2021 08:48) (105/60 - 135/66)  BP(mean): 87 (21 May 2021 22:10) (83 - 95)  RR: 18 (22 May 2021 08:48) (17 - 18)  SpO2: 98% (22 May 2021 08:48) (97% - 100%)    PHYSICAL EXAM:  Constitutional:  Well-developed, well nourished  Eyes:  Sclerae anicteric, conjunctivae clear, PERRL  Ear/Nose/Throat:  No nasal exudate or sinus tenderness;  No buccal mucosal lesions, no pharyngeal erythema or exudate	  Neck:  Supple, no adenopathy  Respiratory:  Clear bilaterally  Cardiovascular:  RRR, S1S2, no murmur appreciated  Gastrointestinal:  Symmetric, normoactive BS, soft, NT, no masses, guarding or rebound.  No HSM  Extremities:  R foot in splint, RLE edema markedly reduced, less warmth      LABS:                        8.7    7.24  )-----------( 144      ( 22 May 2021 06:42 )             26.7         05-22    134<L>  |  102  |  20  ----------------------------<  226<H>  3.7   |  23  |  1.62<H>    Ca    7.4<L>      22 May 2021 06:42  Phos  2.0     -  Mg     1.8         TPro  6.6  /  Alb  2.5<L>  /  TBili  0.4  /  DBili  x   /  AST  20  /  ALT  11  /  AlkPhos  60      Vancomycin trough 15.1 ( 06:42;  prior dose 17:45)    Urinalysis Basic - ( 21 May 2021 13:33 )    Color: Yellow / Appearance: Cloudy / S.025 / pH: x  Gluc: x / Ketone: Trace mg/dL  / Bili: Negative / Urobili: 0.2 E.U./dL   Blood: x / Protein: >=300 mg/dL / Nitrite: NEGATIVE   Leuk Esterase: NEGATIVE / RBC: < 5 /HPF / WBC < 5 /HPF   Sq Epi: x / Non Sq Epi: 5-10 /HPF / Bacteria: Present /HPF        MICROBIOLOGY:  Blood cultures:  5/20 X 2 - NGTD    Surgical swab :  mod Staph aureus, few Citrobacter koseri S except amp    Surgical swab R great toe wound OR specimen :  no orgs, rare WBCs, NGTD        RADIOLOGY & ADDITIONAL STUDIES:

## 2021-05-22 NOTE — PROGRESS NOTE ADULT - ASSESSMENT
41M w h/o iDDM (8.5), prior L foot osteomyelitis w multiple amputations in L foot p/w severe sepsis c/b GO and ATN 2/2 R great toe osteomyelitis s/p R Hallux amputation 5/21 - on IV Vancomycin and Zosyn    #Sepsis 2/2 R great toe osteomyelitis - appear improved  #GO - continues to improve  #Normocytic anemia - 7.7 from 9.9. Possibly operative losses vs fluid as Plt also down  #Thrombocytopenia - 144 from 262  #Protein gap - suspect polyclonal in setting of recent infection  #DM - lantus 10 - BGs at target  #HLD - atorva 20    Repeat CBC w Diff  SPEP, UPEP, FLC  F/U Cultures and sensitivities  F/U ID, Podiatry Recs  Possible return to OR for delayed closure MON 5/24    DISPO: TBD pending final recs, PT eval post surgery - anticipate Home

## 2021-05-23 LAB
-  CEFAZOLIN: SIGNIFICANT CHANGE UP
-  CLINDAMYCIN: SIGNIFICANT CHANGE UP
-  ERYTHROMYCIN: SIGNIFICANT CHANGE UP
-  LINEZOLID: SIGNIFICANT CHANGE UP
-  OXACILLIN: SIGNIFICANT CHANGE UP
-  RIFAMPIN: SIGNIFICANT CHANGE UP
-  TRIMETHOPRIM/SULFAMETHOXAZOLE: SIGNIFICANT CHANGE UP
-  VANCOMYCIN: SIGNIFICANT CHANGE UP
ANION GAP SERPL CALC-SCNC: 10 MMOL/L — SIGNIFICANT CHANGE UP (ref 5–17)
BASOPHILS # BLD AUTO: 0.03 K/UL — SIGNIFICANT CHANGE UP (ref 0–0.2)
BASOPHILS NFR BLD AUTO: 0.5 % — SIGNIFICANT CHANGE UP (ref 0–2)
BUN SERPL-MCNC: 12 MG/DL — SIGNIFICANT CHANGE UP (ref 7–23)
CALCIUM SERPL-MCNC: 8.1 MG/DL — LOW (ref 8.4–10.5)
CHLORIDE SERPL-SCNC: 101 MMOL/L — SIGNIFICANT CHANGE UP (ref 96–108)
CO2 SERPL-SCNC: 26 MMOL/L — SIGNIFICANT CHANGE UP (ref 22–31)
CREAT SERPL-MCNC: 1.36 MG/DL — HIGH (ref 0.5–1.3)
EOSINOPHIL # BLD AUTO: 0.33 K/UL — SIGNIFICANT CHANGE UP (ref 0–0.5)
EOSINOPHIL NFR BLD AUTO: 5.1 % — SIGNIFICANT CHANGE UP (ref 0–6)
GLUCOSE BLDC GLUCOMTR-MCNC: 165 MG/DL — HIGH (ref 70–99)
GLUCOSE BLDC GLUCOMTR-MCNC: 174 MG/DL — HIGH (ref 70–99)
GLUCOSE BLDC GLUCOMTR-MCNC: 178 MG/DL — HIGH (ref 70–99)
GLUCOSE BLDC GLUCOMTR-MCNC: 220 MG/DL — HIGH (ref 70–99)
GLUCOSE SERPL-MCNC: 180 MG/DL — HIGH (ref 70–99)
HCT VFR BLD CALC: 28.1 % — LOW (ref 39–50)
HGB BLD-MCNC: 9 G/DL — LOW (ref 13–17)
IMM GRANULOCYTES NFR BLD AUTO: 0.3 % — SIGNIFICANT CHANGE UP (ref 0–1.5)
LYMPHOCYTES # BLD AUTO: 1.04 K/UL — SIGNIFICANT CHANGE UP (ref 1–3.3)
LYMPHOCYTES # BLD AUTO: 16.1 % — SIGNIFICANT CHANGE UP (ref 13–44)
MCHC RBC-ENTMCNC: 27.3 PG — SIGNIFICANT CHANGE UP (ref 27–34)
MCHC RBC-ENTMCNC: 32 GM/DL — SIGNIFICANT CHANGE UP (ref 32–36)
MCV RBC AUTO: 85.2 FL — SIGNIFICANT CHANGE UP (ref 80–100)
METHOD TYPE: SIGNIFICANT CHANGE UP
MONOCYTES # BLD AUTO: 0.58 K/UL — SIGNIFICANT CHANGE UP (ref 0–0.9)
MONOCYTES NFR BLD AUTO: 9 % — SIGNIFICANT CHANGE UP (ref 2–14)
NEUTROPHILS # BLD AUTO: 4.45 K/UL — SIGNIFICANT CHANGE UP (ref 1.8–7.4)
NEUTROPHILS NFR BLD AUTO: 69 % — SIGNIFICANT CHANGE UP (ref 43–77)
NRBC # BLD: 0 /100 WBCS — SIGNIFICANT CHANGE UP (ref 0–0)
PLATELET # BLD AUTO: 298 K/UL — SIGNIFICANT CHANGE UP (ref 150–400)
POTASSIUM SERPL-MCNC: 3.5 MMOL/L — SIGNIFICANT CHANGE UP (ref 3.5–5.3)
POTASSIUM SERPL-SCNC: 3.5 MMOL/L — SIGNIFICANT CHANGE UP (ref 3.5–5.3)
RBC # BLD: 3.3 M/UL — LOW (ref 4.2–5.8)
RBC # FLD: 12.8 % — SIGNIFICANT CHANGE UP (ref 10.3–14.5)
SODIUM SERPL-SCNC: 137 MMOL/L — SIGNIFICANT CHANGE UP (ref 135–145)
VANCOMYCIN TROUGH SERPL-MCNC: 18.5 UG/ML — SIGNIFICANT CHANGE UP (ref 10–20)
WBC # BLD: 6.45 K/UL — SIGNIFICANT CHANGE UP (ref 3.8–10.5)
WBC # FLD AUTO: 6.45 K/UL — SIGNIFICANT CHANGE UP (ref 3.8–10.5)

## 2021-05-23 PROCEDURE — 99233 SBSQ HOSP IP/OBS HIGH 50: CPT | Mod: GC

## 2021-05-23 RX ORDER — ENOXAPARIN SODIUM 100 MG/ML
40 INJECTION SUBCUTANEOUS EVERY 24 HOURS
Refills: 0 | Status: DISCONTINUED | OUTPATIENT
Start: 2021-05-23 | End: 2021-05-23

## 2021-05-23 RX ORDER — HEPARIN SODIUM 5000 [USP'U]/ML
5000 INJECTION INTRAVENOUS; SUBCUTANEOUS EVERY 8 HOURS
Refills: 0 | Status: DISCONTINUED | OUTPATIENT
Start: 2021-05-23 | End: 2021-05-24

## 2021-05-23 RX ORDER — VANCOMYCIN HCL 1 G
1000 VIAL (EA) INTRAVENOUS EVERY 12 HOURS
Refills: 0 | Status: COMPLETED | OUTPATIENT
Start: 2021-05-23 | End: 2021-05-24

## 2021-05-23 RX ORDER — POTASSIUM CHLORIDE 20 MEQ
40 PACKET (EA) ORAL ONCE
Refills: 0 | Status: COMPLETED | OUTPATIENT
Start: 2021-05-23 | End: 2021-05-23

## 2021-05-23 RX ADMIN — Medication 2: at 18:12

## 2021-05-23 RX ADMIN — ATORVASTATIN CALCIUM 20 MILLIGRAM(S): 80 TABLET, FILM COATED ORAL at 22:01

## 2021-05-23 RX ADMIN — HEPARIN SODIUM 5000 UNIT(S): 5000 INJECTION INTRAVENOUS; SUBCUTANEOUS at 22:00

## 2021-05-23 RX ADMIN — PIPERACILLIN AND TAZOBACTAM 200 GRAM(S): 4; .5 INJECTION, POWDER, LYOPHILIZED, FOR SOLUTION INTRAVENOUS at 17:20

## 2021-05-23 RX ADMIN — Medication 250 MILLIGRAM(S): at 06:05

## 2021-05-23 RX ADMIN — Medication 4: at 22:01

## 2021-05-23 RX ADMIN — PIPERACILLIN AND TAZOBACTAM 200 GRAM(S): 4; .5 INJECTION, POWDER, LYOPHILIZED, FOR SOLUTION INTRAVENOUS at 06:04

## 2021-05-23 RX ADMIN — Medication 2: at 13:08

## 2021-05-23 RX ADMIN — INSULIN GLARGINE 10 UNIT(S): 100 INJECTION, SOLUTION SUBCUTANEOUS at 22:01

## 2021-05-23 RX ADMIN — Medication 250 MILLIGRAM(S): at 18:10

## 2021-05-23 RX ADMIN — PIPERACILLIN AND TAZOBACTAM 200 GRAM(S): 4; .5 INJECTION, POWDER, LYOPHILIZED, FOR SOLUTION INTRAVENOUS at 22:01

## 2021-05-23 RX ADMIN — PIPERACILLIN AND TAZOBACTAM 200 GRAM(S): 4; .5 INJECTION, POWDER, LYOPHILIZED, FOR SOLUTION INTRAVENOUS at 11:30

## 2021-05-23 RX ADMIN — Medication 40 MILLIEQUIVALENT(S): at 11:30

## 2021-05-23 RX ADMIN — Medication 2: at 09:40

## 2021-05-23 NOTE — PROGRESS NOTE ADULT - ASSESSMENT
42 yo M w/ PMHx Type 2 IDDM, L foot OM s/p L 1st partial ray (9/2019 @ [facility]) and L 4th digit amputation (2018 @ [place]) presents to [facility] ED c/o worsening R great toe wound a/w severe swelling and malodor of 1 week duration. Podiatry consulted for surgical mgmt of severely infected R great toe wound; requires amputation for trt of osteomyelitis of distal phalanx of R hallux. Pt is s/p amputation of right hallux w/ surgical site left open & packed w/ plain packing.    P:   Continue IV abx per ID recs  5/20 WCX  growing Citrobacter koseri, MSSA  5/21 Intra-Op WCX NGTD  Pain control PRN   WBAT to R heel in surgical shoe   Pt will likely RTOR early next week (Mon?) for delayed primary closure of R hallux amp site   Podiatry following   42 yo M w/ PMHx Type 2 IDDM, L foot OM s/p L 1st partial ray (9/2019 @ [facility]) and L 4th digit amputation (2018 @ [place]) presents to [facility] ED c/o worsening R great toe wound a/w severe swelling and malodor of 1 week duration. Podiatry consulted for surgical mgmt of severely infected R great toe wound; requires amputation for trt of osteomyelitis of distal phalanx of R hallux. Pt is s/p amputation of right hallux w/ surgical site left open & packed w/ plain packing.    P:   Continue IV abx per ID recs  5/20 WCX  growing Citrobacter koseri, MSSA  5/21 Intra-Op WCX NGTD  Pain control PRN   WBAT to R heel in surgical shoe   Pt will likely RTOR early next week (Mon?) for delayed primary closure of R hallux amp site   Amputation is definitive source control, 1st met appeared intact & healthy; likely won't need long-term IV abx   Podiatry following   42 yo M w/ PMHx Type 2 IDDM, L foot OM s/p L 1st partial ray (9/2019 @ [facility]) and L 4th digit amputation (2018 @ [place]) presents to [facility] ED c/o worsening R great toe wound a/w severe swelling and malodor of 1 week duration. Podiatry consulted for surgical mgmt of severely infected R great toe wound; requires amputation for trt of osteomyelitis of distal phalanx of R hallux. Pt is s/p amputation of right hallux w/ surgical site left open & packed w/ plain packing (DOS 5/21).    P:   Continue IV abx per ID recs  5/20 ER WCX  growing Citrobacter koseri, MSSA  5/21 Intra-Op WCX NGTD  Pain control PRN   WBAT to R heel in surgical shoe   Pt will likely RTOR early next week (Mon?) for delayed primary closure of R hallux amp site   Amputation is definitive source control, 1st met appeared intact & healthy; likely won't need long-term IV abx   Podiatry following

## 2021-05-23 NOTE — PROGRESS NOTE ADULT - PROBLEM SELECTOR PLAN 1
History of type 2 IDDM, presenting with 1 week of worsening R great toe wound. , WBC 12.99, Cr of 1.87 (Cr 1.10 in 9/2019). Lactate wnl. Found to have a purulent diabetic ulcer with surrounding cellulitis. s/p 1L NS bolus, Vanc, Zosyn in ED  - Concern for OM given elevated ESR/CRP   - Continue Vancomycin 1g q12h, will check trough before 4th dose (5/22 AM)  - Continue Zosyn 3.375 q6h   - Podiatry team following; 5pm R haullux biopsy potentially amputation  - Follow up bone bx  - ID consulted for Long term OM antibiotics mgmt History of type 2 IDDM, presenting with 1 week of worsening R great toe wound. , WBC 12.99, Cr of 1.87 (Cr 1.10 in 9/2019). Lactate wnl. Found to have a purulent diabetic ulcer with surrounding cellulitis. s/p 1L NS bolus, Vanc, Zosyn in ED  - Concern for OM given elevated ESR/CRP   - Continue Vancomycin 1g q12h  - Continue Zosyn 3.375 q6h   - s/p R great toe amputation Follow up bone bx        - will return to OR mon or tues for site closure  - ID consulted for Long term OM antibiotics mgmt

## 2021-05-23 NOTE — PROGRESS NOTE ADULT - PROBLEM SELECTOR PLAN 4
Extensive history of type 2 IDDM with previous L foot/toe infections and amputations. Admits to insulin compliance; on Basaglar 20 units and Admelog 5units TID. Previously with A1C in 14s  - BHB slightly elevated to 0.7, however without significant hyperglycemia, no acidosis   - A1C 8.5 this admission  - Continue Lantus 10 units, will be NPO after midnight. Hold pre meal Insulin.   - ISS, FS Cr of 1.87 this admission. Cr noted to be 1.10 in 9/2019. Likely 2/2 diabetic nephropathy vs sepsis vs poor po intake. s/p 1L bolus in ED  - Cr  - dialy BMP

## 2021-05-23 NOTE — PROGRESS NOTE ADULT - SUBJECTIVE AND OBJECTIVE BOX
Patient is a 41y old  Male who presents with a chief complaint of Diabetic wound (23 May 2021 06:42)      INTERVAL HPI/ OVERNIGHT EVENTS: CA O/N.       LABS                        9.0    6.45  )-----------( 298      ( 23 May 2021 08:47 )             28.1     05-23    137  |  101  |  12  ----------------------------<  180<H>  3.5   |  26  |  1.36<H>    Ca    8.1<L>      23 May 2021 08:46  Phos  2.0     05-22  Mg     1.8     05-22          ICU Vital Signs Last 24 Hrs  T(C): 36.8 (23 May 2021 09:05), Max: 36.8 (22 May 2021 16:36)  T(F): 98.3 (23 May 2021 09:05), Max: 98.3 (22 May 2021 16:36)  HR: 76 (23 May 2021 09:05) (76 - 84)  BP: 122/69 (23 May 2021 09:05) (120/67 - 125/71)  BP(mean): --  ABP: --  ABP(mean): --  RR: 16 (23 May 2021 09:05) (16 - 18)  SpO2: 99% (23 May 2021 09:05) (98% - 99%)      RADIOLOGY  < from: Xray Foot AP + Lateral, Right (05.21.21 @ 21:46) >  INTERPRETATION:  Clinical History: Postop    2 views of the right foot demonstrates patient to be status post fixation level of the first metatarsal phalangeal joint. No radiographic evidence of osteomyelitis noted.    IMPRESSION: Status post amputation right foot level of the first metatarsal phalangeal joint.    < end of copied text >        < from: Xray Foot AP + Lateral + Oblique, Right (05.20.21 @ 17:27) >  Findings/  impression: First distal phalangeal soft tissue injury, swelling, gas, terminal tuft osteomyelitis. Soft tissue vascular calcification. Plantar calcaneal 5 mm spur    < end of copied text >      MICROBIOLOGY    Culture - Surgical Swab (collected 21 May 2021 21:27)  Source: .Surgical Swab Right Great Toe Wound Or Spec  Gram Stain (21 May 2021 22:07):    No organisms seen    Rare WBC's  Preliminary Report (22 May 2021 08:58):    No growth to date    Culture - Surgical Swab (collected 20 May 2021 19:15)  Source: .Surgical Swab R foot great toe wound  Gram Stain (20 May 2021 20:54):    Numerous Gram positive cocci in pairs    Few Gram Positive Rods    Rare to few White blood cells  Preliminary Report (23 May 2021 11:37):    Few Citrobacter koseri    Few Staphylococcus aureus    Few Streptococcus agalactiae (Group B)    Susceptibility to follow.    Few Alcaligenes faecalis Beta lactamase positive    Few Bacteroides species  Organism: Citrobacter koseri  Staphylococcus aureus (23 May 2021 11:15)  Organism: Staphylococcus aureus (23 May 2021 11:15)  Organism: Citrobacter koseri (22 May 2021 13:47)      PHYSICAL EXAM  Lower Extremity Focused  Vasc: DP 2/4 B/L. L PT 2/4, R PT 1/4.  Derm: R hallux amputation site open, 2 retention sutures in place; packed w/ plain packing. No drainage/purulence expressed. No malodor.   Neuro: Protective sensation intact b/l  MSK: S/P L partial 1st ray amp, L 4th digit amp. S/P R hallux amputation.

## 2021-05-23 NOTE — PROGRESS NOTE ADULT - ASSESSMENT
40 y/o M with PMHx of type 2 IDDM, L foot osteomyelitis s/p L 1st partial ray (9/2019 @ [facility]) and L 4th digit amputation (2018 @ [place]), presents to [facility] with c/o worsening R great toe wound, found to have severe sepsis 2/2 wound infection.

## 2021-05-23 NOTE — PROGRESS NOTE ADULT - PROBLEM SELECTOR PLAN 3
Presenting with significant RLE swelling, with mild tenderness on exam.   - Follow up Ultrasound to r/o DVT    #Sinus tachycardia    on admission. EKG showing sinus rhythm. Denies any pain. Likely 2/2 infection and dehydration. s/p 1 L bolus. PE on the differential given RLE swelling, however no hypoxia, D-dimer and RLE duplex pending.   - Monitor vitals  - Control pain with Tylenol prn Extensive history of type 2 IDDM with previous L foot/toe infections and amputations. Admits to insulin compliance; on Basaglar 20 units and Admelog 5units TID. Previously with A1C in 14s  - BHB slightly elevated to 0.7, however without significant hyperglycemia, no acidosis   - A1C 8.5 this admission  - Continue Lantus 10 units, will be NPO after midnight. Hold pre meal Insulin.   - ISS, FS

## 2021-05-23 NOTE — PROGRESS NOTE ADULT - SUBJECTIVE AND OBJECTIVE BOX
INTERVAL HPI/OVERNIGHT EVENTS:  Patient was seen and examined at bedside. As per nurse and patient, no o/n events, patient resting comfortably. No complaints at this time. Patient denies: fever, chills, dizziness, weakness, HA, Changes in vision, CP, palpitations, SOB, cough, N/V/D/C, dysuria, changes in bowel movements, LE edema. ROS otherwise negative.    VITAL SIGNS:  T(F): 98.3 (21 @ 09:05)  HR: 76 (21 @ 09:05)  BP: 122/69 (21 @ 09:05)  RR: 16 (21 @ 09:05)  SpO2: 99% (21 @ 09:05)  Wt(kg): --    PHYSICAL EXAM:    Constitutional: WDWN, NAD  HEENT: PERRL, EOMI, sclera non-icteric, neck supple, trachea midline, no masses, no JVD, MMM, good dentition  Respiratory: CTA b/l, good air entry b/l, no wheezing, no rhonchi, no rales, without accessory muscle use and no intercostal retractions  Cardiovascular: RRR, normal S1S2, no M/R/G  Gastrointestinal: soft, NTND, no masses palpable, BS normal  Extremities: R- great toe wrapped in gauze, R leg warm and greater in size than L leg , Warm, well perfused, pulses equal bilateral upper and lower extremities, no edema, no clubbing. Capillary refill <2 sec  Neurological: AAOx3, CN Grossly intact  Skin: Normal temperature, warm, dry    MEDICATIONS  (STANDING):  atorvastatin 20 milliGRAM(s) Oral at bedtime  insulin glargine Injectable (LANTUS) 10 Unit(s) SubCutaneous at bedtime  insulin lispro (ADMELOG) corrective regimen sliding scale   SubCutaneous Before meals and at bedtime  piperacillin/tazobactam IVPB.. 3.375 Gram(s) IV Intermittent every 6 hours    MEDICATIONS  (PRN):  acetaminophen   Tablet .. 650 milliGRAM(s) Oral every 6 hours PRN Temp greater or equal to 38C (100.4F), Mild Pain (1 - 3), Moderate Pain (4 - 6)      Allergies    No Known Allergies    Intolerances        LABS:                        9.0    6.45  )-----------( 298      ( 23 May 2021 08:47 )             28.1     05-    137  |  101  |  x   ----------------------------<  x   3.5   |  26  |  x     Ca    7.4<L>      22 May 2021 06:42  Phos  2.0     05-  Mg     1.8             Urinalysis Basic - ( 21 May 2021 13:33 )    Color: Yellow / Appearance: Cloudy / S.025 / pH: x  Gluc: x / Ketone: Trace mg/dL  / Bili: Negative / Urobili: 0.2 E.U./dL   Blood: x / Protein: >=300 mg/dL / Nitrite: NEGATIVE   Leuk Esterase: NEGATIVE / RBC: < 5 /HPF / WBC < 5 /HPF   Sq Epi: x / Non Sq Epi: 5-10 /HPF / Bacteria: Present /HPF        RADIOLOGY & ADDITIONAL TESTS:  Reviewed

## 2021-05-24 ENCOUNTER — TRANSCRIPTION ENCOUNTER (OUTPATIENT)
Age: 41
End: 2021-05-24

## 2021-05-24 LAB
-  AMIKACIN: SIGNIFICANT CHANGE UP
-  AMPICILLIN: SIGNIFICANT CHANGE UP
-  AZTREONAM: SIGNIFICANT CHANGE UP
-  CEFEPIME: SIGNIFICANT CHANGE UP
-  CEFTRIAXONE: SIGNIFICANT CHANGE UP
-  CIPROFLOXACIN: SIGNIFICANT CHANGE UP
-  CLINDAMYCIN: SIGNIFICANT CHANGE UP
-  ERYTHROMYCIN: SIGNIFICANT CHANGE UP
-  GENTAMICIN: SIGNIFICANT CHANGE UP
-  LEVOFLOXACIN: SIGNIFICANT CHANGE UP
-  LEVOFLOXACIN: SIGNIFICANT CHANGE UP
-  MEROPENEM: SIGNIFICANT CHANGE UP
-  PENICILLIN: SIGNIFICANT CHANGE UP
-  PIPERACILLIN/TAZOBACTAM: SIGNIFICANT CHANGE UP
-  TOBRAMYCIN: SIGNIFICANT CHANGE UP
-  TRIMETHOPRIM/SULFAMETHOXAZOLE: SIGNIFICANT CHANGE UP
-  VANCOMYCIN: SIGNIFICANT CHANGE UP
ANION GAP SERPL CALC-SCNC: 10 MMOL/L — SIGNIFICANT CHANGE UP (ref 5–17)
APTT BLD: 33.3 SEC — SIGNIFICANT CHANGE UP (ref 27.5–35.5)
BASOPHILS # BLD AUTO: 0.03 K/UL — SIGNIFICANT CHANGE UP (ref 0–0.2)
BASOPHILS NFR BLD AUTO: 0.5 % — SIGNIFICANT CHANGE UP (ref 0–2)
BLD GP AB SCN SERPL QL: NEGATIVE — SIGNIFICANT CHANGE UP
BUN SERPL-MCNC: 12 MG/DL — SIGNIFICANT CHANGE UP (ref 7–23)
CALCIUM SERPL-MCNC: 8.3 MG/DL — LOW (ref 8.4–10.5)
CHLORIDE SERPL-SCNC: 101 MMOL/L — SIGNIFICANT CHANGE UP (ref 96–108)
CO2 SERPL-SCNC: 29 MMOL/L — SIGNIFICANT CHANGE UP (ref 22–31)
CREAT SERPL-MCNC: 1.34 MG/DL — HIGH (ref 0.5–1.3)
CULTURE RESULTS: SIGNIFICANT CHANGE UP
EOSINOPHIL # BLD AUTO: 0.44 K/UL — SIGNIFICANT CHANGE UP (ref 0–0.5)
EOSINOPHIL NFR BLD AUTO: 6.8 % — HIGH (ref 0–6)
GLUCOSE BLDC GLUCOMTR-MCNC: 104 MG/DL — HIGH (ref 70–99)
GLUCOSE BLDC GLUCOMTR-MCNC: 117 MG/DL — HIGH (ref 70–99)
GLUCOSE BLDC GLUCOMTR-MCNC: 161 MG/DL — HIGH (ref 70–99)
GLUCOSE BLDC GLUCOMTR-MCNC: 193 MG/DL — HIGH (ref 70–99)
GLUCOSE SERPL-MCNC: 193 MG/DL — HIGH (ref 70–99)
HCT VFR BLD CALC: 30.6 % — LOW (ref 39–50)
HGB BLD-MCNC: 9.8 G/DL — LOW (ref 13–17)
IMM GRANULOCYTES NFR BLD AUTO: 0.6 % — SIGNIFICANT CHANGE UP (ref 0–1.5)
INR BLD: 1.13 — SIGNIFICANT CHANGE UP (ref 0.88–1.16)
LYMPHOCYTES # BLD AUTO: 1.35 K/UL — SIGNIFICANT CHANGE UP (ref 1–3.3)
LYMPHOCYTES # BLD AUTO: 20.8 % — SIGNIFICANT CHANGE UP (ref 13–44)
MAGNESIUM SERPL-MCNC: 1.8 MG/DL — SIGNIFICANT CHANGE UP (ref 1.6–2.6)
MCHC RBC-ENTMCNC: 27.3 PG — SIGNIFICANT CHANGE UP (ref 27–34)
MCHC RBC-ENTMCNC: 32 GM/DL — SIGNIFICANT CHANGE UP (ref 32–36)
MCV RBC AUTO: 85.2 FL — SIGNIFICANT CHANGE UP (ref 80–100)
METHOD TYPE: SIGNIFICANT CHANGE UP
METHOD TYPE: SIGNIFICANT CHANGE UP
MONOCYTES # BLD AUTO: 0.56 K/UL — SIGNIFICANT CHANGE UP (ref 0–0.9)
MONOCYTES NFR BLD AUTO: 8.6 % — SIGNIFICANT CHANGE UP (ref 2–14)
NEUTROPHILS # BLD AUTO: 4.07 K/UL — SIGNIFICANT CHANGE UP (ref 1.8–7.4)
NEUTROPHILS NFR BLD AUTO: 62.7 % — SIGNIFICANT CHANGE UP (ref 43–77)
NRBC # BLD: 0 /100 WBCS — SIGNIFICANT CHANGE UP (ref 0–0)
ORGANISM # SPEC MICROSCOPIC CNT: SIGNIFICANT CHANGE UP
PHOSPHATE SERPL-MCNC: 3.9 MG/DL — SIGNIFICANT CHANGE UP (ref 2.5–4.5)
PLATELET # BLD AUTO: 344 K/UL — SIGNIFICANT CHANGE UP (ref 150–400)
POTASSIUM SERPL-MCNC: 3.6 MMOL/L — SIGNIFICANT CHANGE UP (ref 3.5–5.3)
POTASSIUM SERPL-SCNC: 3.6 MMOL/L — SIGNIFICANT CHANGE UP (ref 3.5–5.3)
PROTHROM AB SERPL-ACNC: 13.5 SEC — SIGNIFICANT CHANGE UP (ref 10.6–13.6)
RBC # BLD: 3.59 M/UL — LOW (ref 4.2–5.8)
RBC # FLD: 12.9 % — SIGNIFICANT CHANGE UP (ref 10.3–14.5)
RH IG SCN BLD-IMP: POSITIVE — SIGNIFICANT CHANGE UP
SARS-COV-2 RNA SPEC QL NAA+PROBE: NEGATIVE — SIGNIFICANT CHANGE UP
SODIUM SERPL-SCNC: 140 MMOL/L — SIGNIFICANT CHANGE UP (ref 135–145)
SPECIMEN SOURCE: SIGNIFICANT CHANGE UP
VANCOMYCIN TROUGH SERPL-MCNC: 16.6 UG/ML — SIGNIFICANT CHANGE UP (ref 10–20)
WBC # BLD: 6.49 K/UL — SIGNIFICANT CHANGE UP (ref 3.8–10.5)
WBC # FLD AUTO: 6.49 K/UL — SIGNIFICANT CHANGE UP (ref 3.8–10.5)

## 2021-05-24 PROCEDURE — 99232 SBSQ HOSP IP/OBS MODERATE 35: CPT

## 2021-05-24 PROCEDURE — 99233 SBSQ HOSP IP/OBS HIGH 50: CPT | Mod: GC

## 2021-05-24 RX ORDER — ACETAMINOPHEN WITH CODEINE 300MG-30MG
1 TABLET ORAL EVERY 4 HOURS
Refills: 0 | Status: DISCONTINUED | OUTPATIENT
Start: 2021-05-24 | End: 2021-05-26

## 2021-05-24 RX ORDER — OXYCODONE AND ACETAMINOPHEN 5; 325 MG/1; MG/1
1 TABLET ORAL EVERY 4 HOURS
Refills: 0 | Status: DISCONTINUED | OUTPATIENT
Start: 2021-05-24 | End: 2021-05-26

## 2021-05-24 RX ORDER — ONDANSETRON 8 MG/1
4 TABLET, FILM COATED ORAL EVERY 6 HOURS
Refills: 0 | Status: DISCONTINUED | OUTPATIENT
Start: 2021-05-24 | End: 2021-05-26

## 2021-05-24 RX ORDER — SODIUM CHLORIDE 9 MG/ML
1000 INJECTION, SOLUTION INTRAVENOUS
Refills: 0 | Status: DISCONTINUED | OUTPATIENT
Start: 2021-05-24 | End: 2021-05-25

## 2021-05-24 RX ORDER — MAGNESIUM SULFATE 500 MG/ML
2 VIAL (ML) INJECTION ONCE
Refills: 0 | Status: COMPLETED | OUTPATIENT
Start: 2021-05-24 | End: 2021-05-24

## 2021-05-24 RX ORDER — OXYCODONE AND ACETAMINOPHEN 5; 325 MG/1; MG/1
2 TABLET ORAL EVERY 6 HOURS
Refills: 0 | Status: DISCONTINUED | OUTPATIENT
Start: 2021-05-24 | End: 2021-05-26

## 2021-05-24 RX ORDER — HYDROMORPHONE HYDROCHLORIDE 2 MG/ML
1 INJECTION INTRAMUSCULAR; INTRAVENOUS; SUBCUTANEOUS EVERY 4 HOURS
Refills: 0 | Status: DISCONTINUED | OUTPATIENT
Start: 2021-05-24 | End: 2021-05-26

## 2021-05-24 RX ADMIN — Medication 250 MILLIGRAM(S): at 18:03

## 2021-05-24 RX ADMIN — Medication 2: at 09:10

## 2021-05-24 RX ADMIN — Medication 50 GRAM(S): at 09:10

## 2021-05-24 RX ADMIN — PIPERACILLIN AND TAZOBACTAM 200 GRAM(S): 4; .5 INJECTION, POWDER, LYOPHILIZED, FOR SOLUTION INTRAVENOUS at 23:48

## 2021-05-24 RX ADMIN — Medication 250 MILLIGRAM(S): at 05:51

## 2021-05-24 RX ADMIN — SODIUM CHLORIDE 100 MILLILITER(S): 9 INJECTION, SOLUTION INTRAVENOUS at 23:59

## 2021-05-24 RX ADMIN — ATORVASTATIN CALCIUM 20 MILLIGRAM(S): 80 TABLET, FILM COATED ORAL at 23:48

## 2021-05-24 RX ADMIN — HEPARIN SODIUM 5000 UNIT(S): 5000 INJECTION INTRAVENOUS; SUBCUTANEOUS at 05:51

## 2021-05-24 RX ADMIN — PIPERACILLIN AND TAZOBACTAM 200 GRAM(S): 4; .5 INJECTION, POWDER, LYOPHILIZED, FOR SOLUTION INTRAVENOUS at 11:44

## 2021-05-24 RX ADMIN — Medication 2: at 13:04

## 2021-05-24 RX ADMIN — PIPERACILLIN AND TAZOBACTAM 200 GRAM(S): 4; .5 INJECTION, POWDER, LYOPHILIZED, FOR SOLUTION INTRAVENOUS at 05:50

## 2021-05-24 RX ADMIN — PIPERACILLIN AND TAZOBACTAM 200 GRAM(S): 4; .5 INJECTION, POWDER, LYOPHILIZED, FOR SOLUTION INTRAVENOUS at 17:22

## 2021-05-24 NOTE — BRIEF OPERATIVE NOTE - NSICDXBRIEFPROCEDURE_GEN_ALL_CORE_FT
PROCEDURES:  Closure, wound, foot, delayed, primary 24-May-2021 22:15:34  Cass Nichols  
PROCEDURES:  Amputation of right great toe 21-May-2021 21:02:07  De Rula Moulton

## 2021-05-24 NOTE — BRIEF OPERATIVE NOTE - SPECIMENS
1. right great toe deep wound swab 2. right great toe bone specimen (dirty)
Deep wound cx of 1st metatarsal

## 2021-05-24 NOTE — DIETITIAN INITIAL EVALUATION ADULT. - PROBLEM SELECTOR PLAN 6
F: s/p 1L in ED  E: Replete prn  N: Carb consistent, NPO after midnight   DVT: Heparin 5000 q8h  Dispo: [facility]

## 2021-05-24 NOTE — DIETITIAN INITIAL EVALUATION ADULT. - ADD RECOMMEND
1. Advance diet to consistent carbohydrate 2. Trend wts 3. Monitor lytes, gluocose, skin 4. Reinforce diet education prn

## 2021-05-24 NOTE — PROGRESS NOTE ADULT - ASSESSMENT
42 yo M with IDDM, prior h/o DFI L foot with R hallux osteomyelitis s/p amputation 5/21.  Culture of drainage sent from ED now is growing Staph aureus and Citrobacter.  OR culture (apparently clean margin?) now growing Staph in liquid media only  Suggest:  - Continue to f/u blood cultures from 5/20  - Continue to f/u OR  culture - if coag neg Staph in broth only, may not need to treat.  - Continue vancomycin 1 g IV q12h (goal trough:  14-17)  - Continue pip-tazo 3.375 g IV q6h  - Per Podiatry, will RTOR for delayed primary closure of amputation site this evening.  Recommendations discussed with primary team.  Will follow with you - team 2.

## 2021-05-24 NOTE — DIETITIAN INITIAL EVALUATION ADULT. - PROBLEM SELECTOR PLAN 1
History of type 2 IDDM, presenting with 1 week of worsening R great toe wound. , WBC 12.99, Cr of 1.87 (Cr 1.10 in 9/2019). Lactate wnl. Found to have a purulent diabetic ulcer with surrounding cellulitis. s/p 1L NS bolus, Vanc, Zosyn in ED  - Concern for OM given elevated ESR/CRP   - Continue Vancomycin 1g q12h, will check trough before 4th dose (5/22 AM)  - Continue Zosyn 3.375 q6h   - Follow up BCx, wound cx   - Follow up foot xray   - Podiatry team following; NPO at midnight with plan for OR tomorrow for R hallux amputation

## 2021-05-24 NOTE — BRIEF OPERATIVE NOTE - CONDITION POST OP
Aidan L Omizo Hodgkins is 6 month old, here for a preventive care visit.    Assessment & Plan     (Z00.129) Encounter for routine child health examination w/o abnormal findings  (primary encounter diagnosis)  Plan: Maternal Health Risk Assessment (08567) - EPDS,        DTAP - HIB - IPV (PENTACEL), IM USE, HEPATITIS         B VACCINE,PED/ADOL,IM, PNEUMOCOC CONJ VAC 13         ARIANNA (MNVAC), ROTAVIRUS VACC PENTAV 3 DOSE SCHED        LIVE ORAL, INFLUENZA VACCINE IM > 6 MONTHS         VALENT IIV4 (AFLURIA/FLUZONE)        Normal growth and development.      Concerns about possible seizure activity over the last couple months.  Seen by neurology and EEG was normal.  Parents say episodes have resolved.        Growth        Growth is appropriate for age.    Immunizations     Appropriate vaccinations were ordered.      Anticipatory Guidance    Reviewed age appropriate anticipatory guidance.   The following topics were discussed:  SOCIAL/ FAMILY:    reading to child    Reach Out & Read--book given  NUTRITION:    advancement of solid foods    vitamin D    breastfeeding or formula for 1 year  HEALTH/ SAFETY:    sleep patterns    teething/ dental care    car seat    avoid choke foods    no walkers        Referrals/Ongoing Specialty Care  Verbal referral for routine dental care   Ongoing care by peds neurology/OT    Follow Up      Return in about 3 months (around 2021) for Preventive Care visit.    Patient has been advised of split billing requirements and indicates understanding: Yes      Subjective     Additional Questions 2021   Do you have any questions today that you would like to discuss? Yes   Questions cold since starting . chewing jaw ?   Has your child had a surgery, major illness or injury since the last physical exam? No       Social 2021   Who does your child live with? Parent(s)   Who takes care of your child? Parent(s),    Has your child experienced any stressful family events recently? 
None   In the past 12 months, has lack of transportation kept you from medical appointments or from getting medications? No   In the last 12 months, was there a time when you were not able to pay the mortgage or rent on time? No   In the last 12 months, was there a time when you did not have a steady place to sleep or slept in a shelter (including now)? No       [place]  Depression Scale (EPDS) Risk Assessment: Completed [place]    Health Risks/Safety 2021   What type of car seat does your child use?  Infant car seat   Is your child's car seat forward or rear facing? Rear facing   Where does your child sit in the car?  Back seat   Are stairs gated at home? (!) NO   Do you use space heaters, wood stove, or a fireplace in your home? No   Are poisons/cleaning supplies and medications kept out of reach? Yes   Do you have guns/firearms in the home? No       TB Screening 2021   Was your child born outside of the [place]? No     TB Screening 2021   Since your last Well Child visit, have any of your child's family members or close contacts had tuberculosis or a positive tuberculosis test? No   Since your last Well Child Visit, has your child or any of their family members or close contacts traveled or lived outside of the [place]? No   Since your last Well Child visit, has your child lived in a high-risk group setting like a correctional facility, health care facility, homeless shelter, or refugee camp? No         Dental Screening 2021   Has your child s parent(s), caregiver, or sibling(s) had any cavities in the last 2 years?  (!) YES, IN THE LAST 6 MONTHS- HIGH RISK     Dental Fluoride Varnish: No, no teeth yet.  Diet 2021   Do you have questions about feeding your baby? No   What does your baby eat? Breast milk, Formula, Baby food/Pureed food   Which type of formula? Enfamil neuro gasease   How does your baby eat? Breastfeeding/Nursing, Bottle, Spoon feeding by caregiver 
"  How often does your baby eat? (From the start of one feed to start of the next feed) -   Do you give your child vitamins or supplements? Vitamin D   Within the past 12 months, you worried that your food would run out before you got money to buy more. Never true   Within the past 12 months, the food you bought just didn't last and you didn't have money to get more. Never true     Elimination 2021   Do you have any concerns about your child's bladder or bowels? No concerns           Media Use 2021   How many hours per day is your child viewing a screen for entertainment? 0     Sleep 2021   Do you have any concerns about your child's sleep? (!) WAKING AT NIGHT, (!) FEEDING TO SLEEP, (!) NIGHTTIME FEEDING   Where does your baby sleep? Crib   In what position does your baby sleep? Back, (!) SIDE, (!) TUMMY     Vision/Hearing 2021   Do you have any concerns about your child's hearing or vision?  No concerns         Development/ Social-Emotional Screen 2021   Does your child receive any special services? (!) OCCUPATIONAL THERAPY     Development    Milestones (by observation/ exam/ report) 75-90% ile  PERSONAL/ SOCIAL/COGNITIVE:    Turns from strangers    Reaches for familiar people    Looks for objects when out of sight  LANGUAGE:    Laughs/ Squeals    Turns to voice/ name    Babbles  GROSS MOTOR:    Rolling    Pull to sit-no head lag    Sit with support  FINE MOTOR/ ADAPTIVE:    Puts objects in mouth    Raking grasp    Transfers hand to hand        Constitutional, eye, ENT, skin, respiratory, cardiac, GI, MSK, neuro, and allergy are normal except as otherwise noted.       Objective     Exam  Ht 2' 2.5\" (0.673 m)   Wt 14 lb 9 oz (6.606 kg)   HC 16.85\" (42.8 cm)   BMI 14.58 kg/m    26 %ile (Z= -0.63) based on WHO (Boys, 0-2 years) head circumference-for-age based on Head Circumference recorded on 2021.  4 %ile (Z= -1.81) based on WHO (Boys, 0-2 years) weight-for-age data using vitals from "
2021.  34 %ile (Z= -0.42) based on WHO (Boys, 0-2 years) Length-for-age data based on Length recorded on 2021.  2 %ile (Z= -2.09) based on WHO (Boys, 0-2 years) weight-for-recumbent length data based on body measurements available as of 2021.  GENERAL: Active, alert, in no acute distress.  SKIN: Clear. No significant rash, abnormal pigmentation or lesions  HEAD: Normocephalic. Normal fontanels and sutures.  EYES: Conjunctivae and cornea normal. Red reflexes present bilaterally.  EARS: Normal canals. Tympanic membranes are normal; gray and translucent.  NOSE: Normal without discharge.  MOUTH/THROAT: Clear. No oral lesions.  NECK: Supple, no masses.  LYMPH NODES: No adenopathy  LUNGS: Clear. No rales, rhonchi, wheezing or retractions  HEART: Regular rhythm. Normal S1/S2. No murmurs. Normal femoral pulses.  ABDOMEN: Soft, non-tender, not distended, no masses or hepatosplenomegaly. Normal umbilicus and bowel sounds.   GENITALIA: Normal male external genitalia. Eduardo stage I,  Testes descended bilaterally, no hernia or hydrocele.    EXTREMITIES: Hips normal with negative Ortolani and Bryson. Symmetric creases and  no deformities  NEUROLOGIC: Normal tone throughout. Normal reflexes for age      ARJUN MILLER MD  [facility]  
Stable
Stable

## 2021-05-24 NOTE — BRIEF OPERATIVE NOTE - NSICDXBRIEFPREOP_GEN_ALL_CORE_FT
PRE-OP DIAGNOSIS:  Osteomyelitis of great toe of right foot 21-May-2021 21:02:23  Rula Higgins  History of amputation of hallux 24-May-2021 22:16:08  Cass Nichols  
PRE-OP DIAGNOSIS:  Osteomyelitis of great toe of right foot 21-May-2021 21:02:23  Rula Higgins

## 2021-05-24 NOTE — PROGRESS NOTE ADULT - PROBLEM SELECTOR PLAN 5
[ruled out]  Dopplers ordered in setting of u/l leg swelling  on admission- negative for DVT    #Sinus tachycardia    on admission. EKG showing sinus rhythm. Denies any pain. Likely 2/2 infection and dehydration. s/p 1 L bolus. PE on the differential given RLE swelling, however no hypoxia, D-dimer and RLE duplex pending.   - Monitor vitals  - Control pain with Tylenol prn

## 2021-05-24 NOTE — BRIEF OPERATIVE NOTE - NSICDXBRIEFPOSTOP_GEN_ALL_CORE_FT
POST-OP DIAGNOSIS:  Osteomyelitis of great toe of right foot 21-May-2021 21:02:40  Rula Higgins  History of amputation of hallux 24-May-2021 22:16:23  Cass Nichols  
POST-OP DIAGNOSIS:  Osteomyelitis of great toe of right foot 21-May-2021 21:02:40  Rula Higgins

## 2021-05-24 NOTE — PROGRESS NOTE ADULT - SUBJECTIVE AND OBJECTIVE BOX
INTERVAL HPI/OVERNIGHT EVENTS:  Patient was seen and examined at bedside. As per nurse and patient, no o/n events, patient resting comfortably. No complaints at this time. Patient denies: fever, chills, dizziness, weakness, HA, Changes in vision, CP, palpitations, SOB, cough, N/V/D/C, dysuria, changes in bowel movements, LE edema. ROS otherwise negative.    VITAL SIGNS:  T(F): 98.4 (05-24-21 @ 08:30)  HR: 67 (05-24-21 @ 08:30)  BP: 146/81 (05-24-21 @ 08:30)  RR: 16 (05-24-21 @ 08:30)  SpO2: 99% (05-24-21 @ 08:30)  Wt(kg): --    PHYSICAL EXAM:    Constitutional: WDWN, NAD  HEENT: PERRL, EOMI, sclera non-icteric, neck supple, trachea midline, no masses, no JVD, MMM, good dentition  Respiratory: CTA b/l, good air entry b/l, no wheezing, no rhonchi, no rales, without accessory muscle use and no intercostal retractions  Cardiovascular: RRR, normal S1S2, no M/R/G  Gastrointestinal: soft, NTND, no masses palpable, BS normal  Extremities: R- great toe wrapped in gauze, R leg warm and greater in size than L leg , Warm, well perfused, pulses equal bilateral upper and lower extremities, no edema, no clubbing. Capillary refill <2 sec  Neurological: AAOx3, CN Grossly intact  Skin: Normal temperature, warm, dry    MEDICATIONS  (STANDING):  atorvastatin 20 milliGRAM(s) Oral at bedtime  insulin glargine Injectable (LANTUS) 10 Unit(s) SubCutaneous at bedtime  insulin lispro (ADMELOG) corrective regimen sliding scale   SubCutaneous Before meals and at bedtime  piperacillin/tazobactam IVPB.. 3.375 Gram(s) IV Intermittent every 6 hours  vancomycin  IVPB 1000 milliGRAM(s) IV Intermittent every 12 hours    MEDICATIONS  (PRN):  acetaminophen   Tablet .. 650 milliGRAM(s) Oral every 6 hours PRN Temp greater or equal to 38C (100.4F), Mild Pain (1 - 3), Moderate Pain (4 - 6)      Allergies    No Known Allergies    Intolerances        LABS:                        9.8    6.49  )-----------( 344      ( 24 May 2021 07:46 )             30.6     05-24    140  |  101  |  12  ----------------------------<  193<H>  3.6   |  29  |  1.34<H>    Ca    8.3<L>      24 May 2021 07:02  Phos  3.9     05-24  Mg     1.8     05-24      PT/INR - ( 24 May 2021 10:45 )   PT: 13.5 sec;   INR: 1.13          PTT - ( 24 May 2021 10:45 )  PTT:33.3 sec      RADIOLOGY & ADDITIONAL TESTS:  Reviewed INTERVAL HPI/OVERNIGHT EVENTS:  Patient was seen and examined at bedside. As per nurse and patient, no o/n events, patient resting comfortably. No complaints at this time. Patient denies: fever, chills, dizziness, weakness, HA, Changes in vision, CP, palpitations, SOB, cough, N/V/D/C, dysuria, changes in bowel movements, LE edema. ROS otherwise negative.    VITAL SIGNS:  T(F): 98.4 (05-24-21 @ 08:30)  HR: 67 (05-24-21 @ 08:30)  BP: 146/81 (05-24-21 @ 08:30)  RR: 16 (05-24-21 @ 08:30)  SpO2: 99% (05-24-21 @ 08:30)  Wt(kg): --    PHYSICAL EXAM:    Constitutional: WDWN, NAD  HEENT: PERRL, EOMI, sclera non-icteric, neck supple, trachea midline, no masses, no JVD, MMM, good dentition  Respiratory: CTA b/l, good air entry b/l, no wheezing, no rhonchi, no rales, without accessory muscle use and no intercostal retractions  Cardiovascular: RRR, normal S1S2, no M/R/G  Gastrointestinal: soft, NTND, no masses palpable, BS normal  Extremities: R- great toe wrapped in gauze, R leg swelling has imporved , Warm, well perfused, pulses equal bilateral upper and lower extremities, no edema, no clubbing. Capillary refill <2 sec  Neurological: AAOx3, CN Grossly intact  Skin: Normal temperature, warm, dry    MEDICATIONS  (STANDING):  atorvastatin 20 milliGRAM(s) Oral at bedtime  insulin glargine Injectable (LANTUS) 10 Unit(s) SubCutaneous at bedtime  insulin lispro (ADMELOG) corrective regimen sliding scale   SubCutaneous Before meals and at bedtime  piperacillin/tazobactam IVPB.. 3.375 Gram(s) IV Intermittent every 6 hours  vancomycin  IVPB 1000 milliGRAM(s) IV Intermittent every 12 hours    MEDICATIONS  (PRN):  acetaminophen   Tablet .. 650 milliGRAM(s) Oral every 6 hours PRN Temp greater or equal to 38C (100.4F), Mild Pain (1 - 3), Moderate Pain (4 - 6)      Allergies    No Known Allergies    Intolerances        LABS:                        9.8    6.49  )-----------( 344      ( 24 May 2021 07:46 )             30.6     05-24    140  |  101  |  12  ----------------------------<  193<H>  3.6   |  29  |  1.34<H>    Ca    8.3<L>      24 May 2021 07:02  Phos  3.9     05-24  Mg     1.8     05-24      PT/INR - ( 24 May 2021 10:45 )   PT: 13.5 sec;   INR: 1.13          PTT - ( 24 May 2021 10:45 )  PTT:33.3 sec      RADIOLOGY & ADDITIONAL TESTS:  Reviewed

## 2021-05-24 NOTE — DIETITIAN INITIAL EVALUATION ADULT. - OTHER INFO
42 y/o M with PMHx of type 2 IDDM, L foot osteomyelitis s/p L 1st partial ray (9/2019 @ [facility]) and L 4th digit amputation (2018 @ [place]), presents to [facility] with c/o worsening R great toe wound, found to have severe sepsis 2/2 wound infection. S/p amputation of R great toe 5/21.    Pt seen in room, resting in bed. Pt reports good appetite PTA, eating 3meals/day. Not following any specific diet. NIKFA. Pt remains NPO at this time for possible OR, of note pt has been NPO x3days. If pt not going to OR today, please advance diet. RD provided education on DM diet, discussed carbohydrate sources and serving sizes. Pt expressed understanding. Pt denies N/V/D/C at present. Reports UBW ~160lbs, denies wt changes, consistent with current wt. Skin +surgical incision. Please see recs below. Will continue to follow per RD protocol. 42 y/o M with PMHx of type 2 IDDM, L foot osteomyelitis s/p L 1st partial ray (9/2019 @ [facility]) and L 4th digit amputation (2018 @ [place]), presents to [facility] with c/o worsening R great toe wound, found to have severe sepsis 2/2 wound infection. S/p amputation of R great toe 5/21.    Pt seen in room, resting in bed. Pt reports good appetite PTA, eating 3meals/day. Not following any specific diet. NKFA. Pt remains NPO at this time for possible OR, of note pt has been NPO x3days. If pt not going to OR today, please advance diet. RD provided education on DM diet, discussed carbohydrate sources and serving sizes. Pt expressed understanding. Pt denies N/V/D/C at present. Reports UBW ~160lbs, denies wt changes, consistent with current wt. Skin +surgical incision. Please see recs below. Will continue to follow per RD protocol.

## 2021-05-24 NOTE — PROGRESS NOTE ADULT - SUBJECTIVE AND OBJECTIVE BOX
PRE-OP NOTE    Pre-Op Diagnosis: R foot wound  Planned Procedure: R foot delayed primary closure  Scheduled Date / Time: 5/24 add on    Labs:                       9.8    6.49  )-----------( 344      ( 24 May 2021 07:46 )             30.6   05-24    140  |  101  |  12  ----------------------------<  193<H>  3.6   |  29  |  1.34<H>    Ca    8.3<L>      24 May 2021 07:02  Phos  3.9     05-24  Mg     1.8     05-24      Vitals: Vital Signs Last 24 Hrs  T(C): 36.9 (24 May 2021 15:52), Max: 36.9 (24 May 2021 08:30)  T(F): 98.4 (24 May 2021 15:52), Max: 98.4 (24 May 2021 08:30)  HR: 67 (24 May 2021 15:52) (67 - 77)  BP: 138/83 (24 May 2021 15:52) (123/71 - 146/81)  BP(mean): --  RR: 18 (24 May 2021 15:52) (16 - 18)  SpO2: 98% (24 May 2021 15:52) (97% - 100%)  Official CXR reading: (On Chart)  Official EKG reading: (On Chart)  Type and Cross/Screen:   NPO after MN  Antibiotics:   Anesthesia evaluation (on chart)  Operative Consent (on chart)    3

## 2021-05-24 NOTE — PROGRESS NOTE ADULT - PROBLEM SELECTOR PLAN 4
Cr of 1.87 this admission. Cr noted to be 1.10 in 9/2019. Likely 2/2 diabetic nephropathy vs sepsis vs poor po intake. s/p 1L bolus in ED  - Cr downtrending   - daily BMP

## 2021-05-24 NOTE — PROGRESS NOTE ADULT - PROBLEM SELECTOR PLAN 1
History of type 2 IDDM, presenting with 1 week of worsening R great toe wound. , WBC 12.99, Cr of 1.87 (Cr 1.10 in 9/2019). Lactate wnl. Found to have a purulent diabetic ulcer with surrounding cellulitis. s/p 1L NS bolus, Vanc, Zosyn in ED  - Concern for OM given elevated ESR/CRP   - Continue Vancomycin 1g q12h  - Continue Zosyn 3.375 q6h   - s/p R great toe amputation Follow up bone bx        - will return to OR this week for site closure  - ID consulted for Long term OM antibiotics mgmt

## 2021-05-24 NOTE — PROGRESS NOTE ADULT - ASSESSMENT
40 y/o M with PMHx of type 2 IDDM, L foot osteomyelitis s/p L 1st partial ray (9/2019 @ [facility]) and L 4th digit amputation (2018 @ [place]), presents to [facility] with c/o worsening R great toe wound, found to have severe sepsis 2/2 wound infection.

## 2021-05-24 NOTE — PROGRESS NOTE ADULT - SUBJECTIVE AND OBJECTIVE BOX
INTERVAL HPI/OVERNIGHT EVENTS:  Remains afebrile, feels much better, no foot pain    CONSTITUTIONAL:  No fever, chills, night sweats  EYES:  No photophobia or visual changes  CARDIOVASCULAR:  No chest pain  RESPIRATORY:  No cough, wheezing, or SOB   GASTROINTESTINAL:  No nausea, vomiting, diarrhea, constipation, or abdominal pain  GENITOURINARY:  No frequency, urgency, dysuria or hematuria  NEUROLOGIC:  No headache, lightheadedness      ANTIBIOTICS/RELEVANT:    Vancomycin 1 g IV q12h (5/20-present)  Pip-tazo 3.375 g IV q6h (5/20-present)      Vital Signs Last 24 Hrs  T(C): 36.9 (24 May 2021 15:52), Max: 36.9 (24 May 2021 08:30)  T(F): 98.4 (24 May 2021 15:52), Max: 98.4 (24 May 2021 08:30)  HR: 67 (24 May 2021 15:52) (67 - 77)  BP: 138/83 (24 May 2021 15:52) (123/71 - 146/81)  BP(mean): --  RR: 18 (24 May 2021 15:52) (16 - 18)  SpO2: 98% (24 May 2021 15:52) (97% - 100%)    PHYSICAL EXAM:  Constitutional:  Well-developed, well nourished  Eyes:  Sclerae anicteric, conjunctivae clear, PERRL  Ear/Nose/Throat:  No nasal exudate or sinus tenderness;  No buccal mucosal lesions, no pharyngeal erythema or exudate	  Neck:  Supple, no adenopathy  Respiratory:  Clear bilaterally  Cardiovascular:  RRR, S1S2, no murmur appreciated  Gastrointestinal:  Symmetric, normoactive BS, soft, NT, no masses, guarding or rebound.  No HSM  Extremities:  No edema.  R foot in splint, RLE edema markedly reduced, less warmth      LABS:                        9.8    6.49  )-----------( 344      ( 24 May 2021 07:46 )             30.6         05-24    140  |  101  |  12  ----------------------------<  193<H>  3.6   |  29  |  1.34<H>    Ca    8.3<L>      24 May 2021 07:02  Phos  3.9     05-24  Mg     1.8     05-24            MICROBIOLOGY:  Blood cultures:  5/20 X 2 - NGTD    Surgical swab 5/20:  mod Staph aureus, few Citrobacter koseri S except amp, GBS, Alcaligenes faecalis, Bacteroides and Corynebacterium    Surgical swab R great toe wound OR specimen 5/21:  no orgs, rare WBCs, growth in broth of Staph spp.        RADIOLOGY & ADDITIONAL STUDIES:

## 2021-05-24 NOTE — PROGRESS NOTE ADULT - PROBLEM SELECTOR PLAN 6
F: none  E: Replete prn  N: NPO for surgery   DVT: Heparin 5000 q8h  Dispo: [facility]

## 2021-05-24 NOTE — DIETITIAN INITIAL EVALUATION ADULT. - PROBLEM SELECTOR PLAN 5
Cr of 1.87 this admission. Cr noted to be 1.10 in 9/2019. Likely 2/2 diabetic nephropathy vs sepsis vs poor po intake. s/p 1L bolus in ED  - Urine lytes  - Trend Cr in AM

## 2021-05-24 NOTE — BRIEF OPERATIVE NOTE - OPERATION/FINDINGS
Right great toe amputation. Amputation site left open; only 2 retention sutures, packed w/ iodoform packing.
Delayed primary wound closure s/p right great toe amputation. Retention sutures were removed and amp site was copiously lavaged with NS. No purulence or pus encountered throughout procedure. Deep wound culture taken from the sx site. Wound site was then primarily closed with nylon sutures and dressed with DSD. Low suspicion for residual infection in soft tissue and bone.

## 2021-05-25 ENCOUNTER — TRANSCRIPTION ENCOUNTER (OUTPATIENT)
Age: 41
End: 2021-05-25

## 2021-05-25 LAB
CULTURE RESULTS: SIGNIFICANT CHANGE UP
CULTURE RESULTS: SIGNIFICANT CHANGE UP
GLUCOSE BLDC GLUCOMTR-MCNC: 100 MG/DL — HIGH (ref 70–99)
GLUCOSE BLDC GLUCOMTR-MCNC: 111 MG/DL — HIGH (ref 70–99)
GLUCOSE BLDC GLUCOMTR-MCNC: 116 MG/DL — HIGH (ref 70–99)
GLUCOSE BLDC GLUCOMTR-MCNC: 154 MG/DL — HIGH (ref 70–99)
GLUCOSE BLDC GLUCOMTR-MCNC: 245 MG/DL — HIGH (ref 70–99)
GRAM STN FLD: SIGNIFICANT CHANGE UP
SPECIMEN SOURCE: SIGNIFICANT CHANGE UP

## 2021-05-25 PROCEDURE — 99232 SBSQ HOSP IP/OBS MODERATE 35: CPT

## 2021-05-25 PROCEDURE — 99233 SBSQ HOSP IP/OBS HIGH 50: CPT | Mod: GC

## 2021-05-25 RX ORDER — HEPARIN SODIUM 5000 [USP'U]/ML
5000 INJECTION INTRAVENOUS; SUBCUTANEOUS EVERY 8 HOURS
Refills: 0 | Status: DISCONTINUED | OUTPATIENT
Start: 2021-05-25 | End: 2021-05-26

## 2021-05-25 RX ORDER — ATORVASTATIN CALCIUM 80 MG/1
1 TABLET, FILM COATED ORAL
Qty: 30 | Refills: 0
Start: 2021-05-25 | End: 2021-06-23

## 2021-05-25 RX ORDER — CEPHALEXIN 500 MG
500 CAPSULE ORAL EVERY 6 HOURS
Refills: 0 | Status: DISCONTINUED | OUTPATIENT
Start: 2021-05-25 | End: 2021-05-26

## 2021-05-25 RX ORDER — CEPHALEXIN 500 MG
1 CAPSULE ORAL
Qty: 28 | Refills: 0
Start: 2021-05-25 | End: 2021-05-31

## 2021-05-25 RX ADMIN — PIPERACILLIN AND TAZOBACTAM 200 GRAM(S): 4; .5 INJECTION, POWDER, LYOPHILIZED, FOR SOLUTION INTRAVENOUS at 06:01

## 2021-05-25 RX ADMIN — ATORVASTATIN CALCIUM 20 MILLIGRAM(S): 80 TABLET, FILM COATED ORAL at 22:30

## 2021-05-25 RX ADMIN — INSULIN GLARGINE 10 UNIT(S): 100 INJECTION, SOLUTION SUBCUTANEOUS at 22:35

## 2021-05-25 RX ADMIN — Medication 500 MILLIGRAM(S): at 22:35

## 2021-05-25 RX ADMIN — INSULIN GLARGINE 10 UNIT(S): 100 INJECTION, SOLUTION SUBCUTANEOUS at 00:57

## 2021-05-25 RX ADMIN — Medication 500 MILLIGRAM(S): at 17:47

## 2021-05-25 RX ADMIN — HEPARIN SODIUM 5000 UNIT(S): 5000 INJECTION INTRAVENOUS; SUBCUTANEOUS at 22:28

## 2021-05-25 RX ADMIN — Medication 4: at 22:28

## 2021-05-25 RX ADMIN — PIPERACILLIN AND TAZOBACTAM 200 GRAM(S): 4; .5 INJECTION, POWDER, LYOPHILIZED, FOR SOLUTION INTRAVENOUS at 12:58

## 2021-05-25 NOTE — PHYSICAL THERAPY INITIAL EVALUATION ADULT - ADDITIONAL COMMENTS
Pt reports he lives in an elevator accessible apartment and performs all ADLs independently prior to this admission. Denies stairs to enter home. Pt reports he lives in an elevator accessible apartment with family and performed all ADLs independently prior to this admission. Denies stairs to enter home.

## 2021-05-25 NOTE — PROGRESS NOTE ADULT - SUBJECTIVE AND OBJECTIVE BOX
POST OP NOTE    COLE FERNANDEZ  MRN-[id number]    Procedure: Delayed primary closure s/p Hallux Amp of the L foot  Surgeon: Erika Dumont DPM  Assistants: Cass Nichols DPM    Patient tolerated procedure well without incident.  Patient transferred to PACU in stable condition.       PE / Post Op Check:       GEN: NAD, AAOx3, resting comfortably with pain controlled      LE Focused: CFT shows adequate perfusion b/l with no signs of ischemic compromise.  No strikethrough is appreciated on surgical dressings.  Dressings were dry, clean, and intact.  No neuromuscular deficits appreciated.

## 2021-05-25 NOTE — DISCHARGE NOTE PROVIDER - NSDCFUADDAPPT_GEN_ALL_CORE_FT
[facility]  [address] [place] [address]  [phone]    2week follow up Please bring your Insurance card, Photo ID and Discharge paperwork to the following:    (1) Please follow up with your Primary Care Provider at   [address] [id number] [phone]    Please note that there is no appointment needed, this clinic functions on a walk-in basis.  They will be opened on Monday- Saturday from 8:00am-5:00pm.  Please follow up within 1 week from your hospital discharge.    Appointment was facilitated by Ms. BIANCA Winston, Referral Coordinator.   Please bring your Insurance card, Photo ID and Discharge paperwork to the following:    (1) Please contact Dr. Kearney's office at [phone] to schedule your appointment in 3 weeks.    (2) Please follow up with your Primary Care Provider at   [address] [address]  [phone]    Please note that there is no appointment needed, this clinic functions on a walk-in basis.  They will be opened on Monday- Saturday from 8:00am-5:00pm.  Please follow up within 1 week from your hospital discharge.    Appointment was facilitated by Ms. BIANCA Winston, Referral Coordinator.   Please bring your Insurance card, Photo ID and Discharge paperwork to the following:    (1) Please contact Dr. Kearney's office at [phone] to schedule your appointment in 3 weeks.    (2) Please follow up with your Primary Care Provider at   [address] [address]  [phone]    Please note that there is no appointment needed, this clinic functions on a walk-in basis.  They will be opened on Monday- Saturday from 8:00am-5:00pm.  Please follow up within 1 week from your hospital discharge.    Appointment was facilitated by Ms. BIANCA Winston, Referral Coordinator.    Please go to your scheduled appointment with our podiatrist Dr. Dumont. They will reach out to you if there is an earlier appointment, otherwise please go to the one scheduled 6/21 at 1:45pm.

## 2021-05-25 NOTE — DISCHARGE NOTE PROVIDER - NSDCMRMEDTOKEN_GEN_ALL_CORE_FT
Admelog 100 units/mL injectable solution: 5 unit(s) injectable 3 times a day  Basaglar KwikPen 100 units/mL subcutaneous solution: 20 unit(s) subcutaneous once a day (at bedtime)   Admelog 100 units/mL injectable solution: 5 unit(s) injectable 3 times a day  Basaglar KwikPen 100 units/mL subcutaneous solution: 20 unit(s) subcutaneous once a day (at bedtime)  Keflex 500 mg oral capsule: 1 cap(s) orally 4 times a day for 7 days  Lipitor 20 mg oral tablet: 1 tab(s) orally once a day (at bedtime)

## 2021-05-25 NOTE — PROGRESS NOTE ADULT - ASSESSMENT
40 yo M w/ PMHx Type 2 IDDM, L foot OM s/p L 1st partial ray (9/2019 @ [facility]) and L 4th digit amputation (2018 @ [place]) presents to [facility] ED c/o worsening R great toe wound a/w severe swelling and malodor of 1 week duration. Podiatry consulted for surgical mgmt of severely infected R great toe wound; requires amputation for trt of osteomyelitis of distal phalanx of R hallux. Pt is s/p amputation of right hallux w/ surgical site left open & packed w/ plain packing (DOS 5/21). 5/24- s/p delayed primary closure of R hallux amp site.    P:   Pain control PRN   Restart DVT ppx in the AM  Return to diet as prescribed  WBAT to R heel in surgical shoe   Sutures and dressings intact on R foot  F/U deep sx swab taken in OR today 5/24  Amputation is definitive source control, 1st met appeared intact & healthy; likely won't need long-term IV abx     Podiatry following

## 2021-05-25 NOTE — PHYSICAL THERAPY INITIAL EVALUATION ADULT - GENERAL OBSERVATIONS, REHAB EVAL
Gait FIM:6 Pt received supine in bed, RLE acewrap dressing in surgical shoe, +heplock. 0/10 pain reported, NAD RN Mineve. Gait FIM:6, Stair FIM: N/A

## 2021-05-25 NOTE — PROGRESS NOTE ADULT - PROBLEM SELECTOR PLAN 6
F: none  E: Replete prn  N: NPO for surgery   DVT: Heparin 5000 q8h  Dispo: [facility] F: none  E: Replete prn  N: consistent carb  DVT: Heparin 5000 q8h  Dispo: F

## 2021-05-25 NOTE — PHYSICAL THERAPY INITIAL EVALUATION ADULT - ACTIVE RANGE OF MOTION EXAMINATION, REHAB EVAL
Pt able to flex/ext remaining digits on RLE./bilateral upper extremity Active ROM was WFL (within functional limits)/bilateral  lower extremity Active ROM was WFL (within functional limits)

## 2021-05-25 NOTE — PROGRESS NOTE ADULT - ASSESSMENT
42 yo M w/ PMHx Type 2 IDDM, L foot OM s/p L 1st partial ray (9/2019 @ [facility]) and L 4th digit amputation (2018 @ [place]) presents to [facility] ED c/o worsening R great toe wound a/w severe swelling and malodor of 1 week duration. Podiatry consulted for surgical mgmt of severely infected R great toe wound; requires amputation for trt of osteomyelitis of distal phalanx of R hallux. Pt is s/p amputation of right hallux w/ delayed primary closure (5/24/21)    P:   Abx per ID recs  Pain control PRN   WBAT to R heel in surgical shoe   Residual bone noted to be healthy. Low suspicion for residual osteomyelitis.  Plan d/w attending    Wound care instructions: Irrigate right foot wound with normal saline.  Apply dry gauze to right foot wound. Wrap foot with kerlix/cling and an ACE bandage.

## 2021-05-25 NOTE — PROGRESS NOTE ADULT - ATTENDING COMMENTS
Patient discussed with resident team and plan of care reviewed. I have personally reviewed all pertinent labs and imaging and performed an independent history and physical. Resident note personally reviewed, and I agree with above resident note with the following additions:    41YOM with history of insulin-dependent diabetes (a1c 8.5% this admission), prior osteomyelitis of left foor s/p multiple L foot amputations admitted for severe sepsis (leukocytosis, tachycardia, acute kidney injury likely ATN 2/2 sepsis) 2/2 diabetic foot infection with distal osteomyelitis of R great toe, s/p amputation of R great toe 5/21.    Doing well, pain well controlled. Back to OR today per podiatry for closure. Reportedly clean margins from OR last week.
Patient discussed with resident team and plan of care reviewed. I have personally reviewed all pertinent labs and imaging and performed an independent history and physical. Resident note personally reviewed, and I agree with above resident note with the following additions:    41YOM with history of insulin-dependent diabetes (a1c 8.5% this admission), prior osteomyelitis of left foor s/p multiple L foot amputations admitted for severe sepsis (leukocytosis, tachycardia, acute kidney injury likely ATN 2/2 sepsis) 2/2 diabetic foot infection with distal osteomyelitis of R great toe, s/p amputation of R great toe 5/21, with RTOR for delayed primary closure 5/24.    Doing well, denies any pain. PT rec home. Per podiatry margins appeared clean. Will discuss with ID, possibly won't need further antibiotics. Once antibiotic plan established then likely will be able to d/c home.
Patient discussed with resident team and plan of care reviewed. I have personally reviewed all pertinent labs and imaging and performed an independent history and physical. Resident note personally reviewed, and I agree with above resident note with the following additions:    41YOM with history of insulin-dependent diabetes (a1c 8.5% this admission), prior osteomyelitis of left foor s/p multiple L foot amputations admitted for severe sepsis (leukocytosis, tachycardia, acute kidney injury likely ATN 2/2 sepsis) 2/2 diabetic foot infection with distal osteomyelitis of R great toe.    To OR with podiatry this afternoon; follow up margins and cultures from OR. On broad spectrum antibiotics for now. ID consult given osteomyelitis. Will get PT as well.
Pt feels well. No pain. Cr continues to improve. Hgb stabilized  --F/U Podiatry recs regarding delayed closure  --F/U Sensitivities and ID recs

## 2021-05-25 NOTE — PROGRESS NOTE ADULT - PROBLEM SELECTOR PLAN 3
Extensive history of type 2 IDDM with previous L foot/toe infections and amputations. Admits to insulin compliance; on Basaglar 20 units and Admelog 5units TID. Previously with A1C in 14s  - BHB slightly elevated to 0.7, however without significant hyperglycemia, no acidosis   - A1C 8.5 this admission  - Continue Lantus 10 units, will be NPO after midnight. Hold pre meal Insulin.   - ISS, FS Extensive history of type 2 IDDM with previous L foot/toe infections and amputations. Admits to insulin compliance; on Basaglar 20 units and Admelog 5units TID. Previously with A1C in 14s  - BHB slightly elevated to 0.7, however without significant hyperglycemia, no acidosis   - A1C 8.5 this admission  - ISS, FS

## 2021-05-25 NOTE — PROGRESS NOTE ADULT - PROBLEM SELECTOR PLAN 1
History of type 2 IDDM, presenting with 1 week of worsening R great toe wound. , WBC 12.99, Cr of 1.87 (Cr 1.10 in 9/2019). Lactate wnl. Found to have a purulent diabetic ulcer with surrounding cellulitis. s/p 1L NS bolus, Vanc, Zosyn in ED  - Concern for OM given elevated ESR/CRP   - Continue Vancomycin 1g q12h  - Continue Zosyn 3.375 q6h   - s/p R great toe amputation Follow up bone bx        - will return to OR this week for site closure  - ID consulted for Long term OM antibiotics mgmt History of type 2 IDDM, presenting with 1 week of worsening R great toe wound. , WBC 12.99, Cr of 1.87 (Cr 1.10 in 9/2019). Lactate wnl. Found to have a purulent diabetic ulcer with surrounding cellulitis. s/p 1L NS bolus, Vanc, Zosyn in ED  - Concern for OM given elevated ESR/CRP   - s/p R great toe amputation and closure  - ID consulted - recommend to dc antibiotics History of type 2 IDDM, presenting with 1 week of worsening R great toe wound. , WBC 12.99, Cr of 1.87 (Cr 1.10 in 9/2019). Lactate wnl. Found to have a purulent diabetic ulcer with surrounding cellulitis. s/p 1L NS bolus, Vanc, Zosyn in ED  - Concern for OM given elevated ESR/CRP   - s/p R great toe amputation and closure  - ID consulted - recommend keflex 7 days 500mg 4xday

## 2021-05-25 NOTE — PROGRESS NOTE ADULT - ASSESSMENT
40 yo M with IDDM, prior h/o DFI L foot with R hallux osteomyelitis s/p amputation 5/21.  Culture of drainage sent from ED grew Citrobacter koseri, MSSA and Alcaligenes, OR culture grew CNS in broth only - would not treat for this organism.  Per Podiatry, little concern for residual infection but OR swab has WBCs - would treat a little longer for soft tissue infection.  In absence of growth of cultures on vanc and pip-tazo, would target the original drain culture.  Both Citrobacter and MSSA are susceptible to cephalexin, unclear if Alcaligenes needs to be covered.  Suggest:  - D/C vanc and pip-tazo  - Start cephalexin 500 mg po q6h X 7 d  - He will call my office for f/u - will see him in ~3 weeks (off antibiotics;  his phone is turned off)  Recommendations discussed with primary team.  Please recall if further ID input is desired - team 2.

## 2021-05-25 NOTE — PROGRESS NOTE ADULT - SUBJECTIVE AND OBJECTIVE BOX
INTERVAL HPI/OVERNIGHT EVENTS:  s/p lavage and closure of wound site in OR last night.  Refused vancomycin this morning    CONSTITUTIONAL:  No fever, chills, night sweats  EYES:  No photophobia or visual changes  CARDIOVASCULAR:  No chest pain  RESPIRATORY:  No cough, wheezing, or SOB   GASTROINTESTINAL:  No nausea, vomiting, diarrhea, constipation, or abdominal pain  GENITOURINARY:  No frequency, urgency, dysuria or hematuria  NEUROLOGIC:  No headache, lightheadedness      ANTIBIOTICS/RELEVANT:    Vancomycin 1 g IV q12h (5/20-present)  Pip-tazo 3.375 g IV q6h (5/20-present)      Vital Signs Last 24 Hrs  T(C): 36.6 (25 May 2021 15:25), Max: 36.9 (24 May 2021 20:57)  T(F): 97.8 (25 May 2021 15:25), Max: 98.4 (24 May 2021 20:57)  HR: 74 (25 May 2021 15:25) (60 - 88)  BP: 143/78 (25 May 2021 15:25) (112/57 - 156/90)  BP(mean): 104 (25 May 2021 00:30) (78 - 104)  RR: 18 (25 May 2021 15:25) (16 - 24)  SpO2: 99% (25 May 2021 15:25) (97% - 100%)    PHYSICAL EXAM:  Constitutional:  Well-developed, well nourished  Eyes:  Sclerae anicterica, conjunctivae clear, PERRL  Ear/Nose/Throat:  No nasal exudate or sinus tenderness;  No buccal mucosal lesions, no pharyngeal erythema or exudate	  Neck:  Supple, no adenopathy  Respiratory:  Clear bilaterally  Cardiovascular:  RRR, S1S2, no murmur appreciated  Gastrointestinal:  Symmetric, normoactive BS, soft, NT, no masses, guarding or rebound.  No HSM  Extremities:  No edema.  R foot in surgical dressing      LABS:                        9.8    6.49  )-----------( 344      ( 24 May 2021 07:46 )             30.6         05-24    140  |  101  |  12  ----------------------------<  193<H>  3.6   |  29  |  1.34<H>    Ca    8.3<L>      24 May 2021 07:02  Phos  3.9     05-24  Mg     1.8     05-24            MICROBIOLOGY:    Blood cultures:  5/20 X 2 - NGTD    Surgical swab 5/20:  mod Staph aureus, few Citrobacter koseri S except amp, GBS, Alcaligenes faecalis, Bacteroides and Corynebacterium    Surgical swab R great toe wound OR specimen 5/21:  no orgs, rare WBCs, growth in broth of Staph spp.  Surgical swab R great toe wound OR specimen 5/24:  no orgs, rare WBCs, NGTD    RADIOLOGY & ADDITIONAL STUDIES:

## 2021-05-25 NOTE — PROGRESS NOTE ADULT - SUBJECTIVE AND OBJECTIVE BOX
INTERVAL HPI/OVERNIGHT EVENTS:  Patient was seen and examined at bedside. As per nurse and patient, no o/n events, patient resting comfortably. No complaints at this time. Patient denies: fever, chills, dizziness, weakness, HA, Changes in vision, CP, palpitations, SOB, cough, N/V/D/C, dysuria, changes in bowel movements, LE edema. ROS otherwise negative.    VITAL SIGNS:  T(F): 98.4 (05-25-21 @ 05:32)  HR: 77 (05-25-21 @ 05:32)  BP: 141/76 (05-25-21 @ 05:32)  RR: 18 (05-25-21 @ 05:32)  SpO2: 98% (05-25-21 @ 05:32)  Wt(kg): --    PHYSICAL EXAM:    Constitutional: WDWN, NAD  HEENT: PERRL, EOMI, sclera non-icteric, neck supple, trachea midline, no masses, no JVD, MMM, good dentition  Respiratory: CTA b/l, good air entry b/l, no wheezing, no rhonchi, no rales, without accessory muscle use and no intercostal retractions  Cardiovascular: RRR, normal S1S2, no M/R/G  Gastrointestinal: soft, NTND, no masses palpable, BS normal  Extremities: Warm, well perfused, pulses equal bilateral upper and lower extremities, no edema, no clubbing. Capillary refill <2 sec  Neurological: AAOx3, CN Grossly intact  Skin: Normal temperature, warm, dry    MEDICATIONS  (STANDING):  atorvastatin 20 milliGRAM(s) Oral at bedtime  insulin glargine Injectable (LANTUS) 10 Unit(s) SubCutaneous at bedtime  insulin lispro (ADMELOG) corrective regimen sliding scale   SubCutaneous Before meals and at bedtime  piperacillin/tazobactam IVPB.. 3.375 Gram(s) IV Intermittent every 6 hours    MEDICATIONS  (PRN):  acetaminophen   Tablet .. 650 milliGRAM(s) Oral every 6 hours PRN Temp greater or equal to 38C (100.4F), Mild Pain (1 - 3), Moderate Pain (4 - 6)  acetaminophen 300 mG/codeine 30 mG 1 Tablet(s) Oral every 4 hours PRN Mild Pain (1 - 3)  HYDROmorphone  Injectable 1 milliGRAM(s) IV Push every 4 hours PRN breakthrough  ondansetron Injectable 4 milliGRAM(s) IV Push every 6 hours PRN Nausea  oxycodone    5 mG/acetaminophen 325 mG 1 Tablet(s) Oral every 4 hours PRN Moderate Pain (4 - 6)  oxycodone    5 mG/acetaminophen 325 mG 2 Tablet(s) Oral every 6 hours PRN Severe Pain (7 - 10)      Allergies    No Known Allergies    Intolerances        LABS:                        9.8    6.49  )-----------( 344      ( 24 May 2021 07:46 )             30.6     05-24    140  |  101  |  12  ----------------------------<  193<H>  3.6   |  29  |  1.34<H>    Ca    8.3<L>      24 May 2021 07:02  Phos  3.9     05-24  Mg     1.8     05-24      PT/INR - ( 24 May 2021 10:45 )   PT: 13.5 sec;   INR: 1.13          PTT - ( 24 May 2021 10:45 )  PTT:33.3 sec      RADIOLOGY & ADDITIONAL TESTS:  Reviewed Hospital Course:  42 y/o M with PMHx of type 2 IDDM, L foot osteomyelitis s/p L 1st partial ray (9/2019 @ [facility]) and L 4th digit amputation (2018 @ [place]), presents to [facility] with c/o worsening R great toe wound. Patient admits to noticing R foot swelling and malodor 1 week ago. Denies loss of sensation, tingling, or pain in the area. He was concerned when his shoe didn't fit 2/2 swelling, with worsening odor which brought him to the ED today. Patient has been compliant with his insulin regimen, and has lowered his A1C from 14s to 8.7. Pt started on Vanc/Zosyn for Osteomyelitis and is s/p R great amputation and closure. Pt is pending Bone biospy culture results and ID recs.    INTERVAL HPI/OVERNIGHT EVENTS:  Patient was seen and examined at bedside. As per nurse and patient, no o/n events, patient resting comfortably. No complaints at this time. Patient denies: fever, chills, dizziness, weakness, HA, Changes in vision, CP, palpitations, SOB, cough, N/V/D/C, dysuria, changes in bowel movements, LE edema. ROS otherwise negative.    VITAL SIGNS:  T(F): 98.4 (05-25-21 @ 05:32)  HR: 77 (05-25-21 @ 05:32)  BP: 141/76 (05-25-21 @ 05:32)  RR: 18 (05-25-21 @ 05:32)  SpO2: 98% (05-25-21 @ 05:32)  Wt(kg): --    PHYSICAL EXAM:    Constitutional: WDWN, NAD  HEENT: PERRL, EOMI, sclera non-icteric, neck supple, trachea midline, no masses, no JVD, MMM, good dentition  Respiratory: CTA b/l, good air entry b/l, no wheezing, no rhonchi, no rales, without accessory muscle use and no intercostal retractions  Cardiovascular: RRR, normal S1S2, no M/R/G  Gastrointestinal: soft, NTND, no masses palpable, BS normal  Extremities: R- great toe wrapped in ACE bandage, Warm, well perfused, pulses equal bilateral upper and lower extremities, no edema, no clubbing. Capillary refill <2 sec  Neurological: AAOx3, CN Grossly intact  Skin: Normal temperature, warm, dry    MEDICATIONS  (STANDING):  atorvastatin 20 milliGRAM(s) Oral at bedtime  insulin glargine Injectable (LANTUS) 10 Unit(s) SubCutaneous at bedtime  insulin lispro (ADMELOG) corrective regimen sliding scale   SubCutaneous Before meals and at bedtime  piperacillin/tazobactam IVPB.. 3.375 Gram(s) IV Intermittent every 6 hours    MEDICATIONS  (PRN):  acetaminophen   Tablet .. 650 milliGRAM(s) Oral every 6 hours PRN Temp greater or equal to 38C (100.4F), Mild Pain (1 - 3), Moderate Pain (4 - 6)  acetaminophen 300 mG/codeine 30 mG 1 Tablet(s) Oral every 4 hours PRN Mild Pain (1 - 3)  HYDROmorphone  Injectable 1 milliGRAM(s) IV Push every 4 hours PRN breakthrough  ondansetron Injectable 4 milliGRAM(s) IV Push every 6 hours PRN Nausea  oxycodone    5 mG/acetaminophen 325 mG 1 Tablet(s) Oral every 4 hours PRN Moderate Pain (4 - 6)  oxycodone    5 mG/acetaminophen 325 mG 2 Tablet(s) Oral every 6 hours PRN Severe Pain (7 - 10)      Allergies    No Known Allergies    Intolerances        LABS:                        9.8    6.49  )-----------( 344      ( 24 May 2021 07:46 )             30.6     05-24    140  |  101  |  12  ----------------------------<  193<H>  3.6   |  29  |  1.34<H>    Ca    8.3<L>      24 May 2021 07:02  Phos  3.9     05-24  Mg     1.8     05-24      PT/INR - ( 24 May 2021 10:45 )   PT: 13.5 sec;   INR: 1.13          PTT - ( 24 May 2021 10:45 )  PTT:33.3 sec      RADIOLOGY & ADDITIONAL TESTS:  Reviewed

## 2021-05-25 NOTE — PROGRESS NOTE ADULT - PROBLEM SELECTOR PLAN 4
Cr of 1.87 this admission. Cr noted to be 1.10 in 9/2019. Likely 2/2 diabetic nephropathy vs sepsis vs poor po intake. s/p 1L bolus in ED  - Cr downtrending   - daily BMP Cr of 1.87 this admission. Cr noted to be 1.10 in 9/2019. Likely 2/2 diabetic nephropathy vs sepsis vs poor po intake. s/p 1L bolus in ED  - Cr downtrending refused labs 5/25  - daily BMP

## 2021-05-25 NOTE — PHYSICAL THERAPY INITIAL EVALUATION ADULT - PERTINENT HX OF CURRENT PROBLEM, REHAB EVAL
42 yo M w/ PMHx Type 2 IDDM, L foot OM s/p L 1st partial ray (9/2019 @ [facility]) and L 4th digit amputation (2018 @ [place]) presents to [facility] ED c/o worsening R great toe wound a/w severe swelling and malodor of 1 week duration. Pt is s/p amputation of right hallux w/ surgical site left open & packed w/ plain packing (DOS 5/21). 5/24- s/p delayed primary closure of R hallux amp site.

## 2021-05-25 NOTE — PHYSICAL THERAPY INITIAL EVALUATION ADULT - MD ORDER
05/24/21- delayed closure of R distal phalanx s/p 05/21 R distal hallux phalanx amp  Per podiatry heel WBAT on RLE; paged Medicine to change order. 05/24/21- s/p delayed primary closure of R hallux amp site.  s/p 05/21-amputation of right hallux w/ surgical site left open & packed w/ plain packingR distal hallux phalanx amp  Per podiatry heel WBAT on RLE; paged Medicine to change order confirmed with  order will be modified. 05/24/21- s/p delayed primary closure of R hallux amp site.  s/p 05/21-amputation of right hallux w/ surgical site left open & packed w/ plain packing  Per podiatry heel WBAT on RLE; paged Medicine to change order confirmed with  order will be modified.

## 2021-05-25 NOTE — DISCHARGE NOTE PROVIDER - HOSPITAL COURSE
DISCHARGE NOTE DO NOT DELETE  40 y/o M with PMHx of type 2 IDDM, L foot osteomyelitis s/p L 1st partial ray (9/2019 @ [facility]) and L 4th digit amputation (2018 @ [place]), presents to [facility] with c/o worsening R great toe wound, found to have severe sepsis 2/2 wound infection. Patient admitted to medicine and started on broad spectrum antibiotics. Pt seen by podiatry who completed R great toe amputation with clean margins and no further antibiotics requires.     Severe sepsis.  - History of type 2 IDDM, presenting with 1 week of worsening R great toe wound. , WBC 12.99, Cr of 1.87 (Cr 1.10 in 9/2019). Lactate wnl. Found to have a purulent diabetic ulcer with surrounding cellulitis. s/p 1L NS bolus, Vanc, Zosyn in ED  - Concern for OM given elevated ESR/CRP   - s/p R great toe amputation and closure  - ID consulted - recommend to dc antibiotics as amputation shows clean noninfected margins      #Type 2 diabetes mellitus with complication  - Extensive history of type 2 IDDM with previous L foot/toe infections and amputations. Admits to insulin compliance; on Basaglar 20 units and Admelog 5units TID. Previously with A1C in 14s  - BHB slightly elevated to 0.7, however without significant hyperglycemia, no acidosis   - A1C 8.5 this admission  - c/w home regimen upon discharge     #GO (acute kidney injury).   - Cr of 1.87 this admission. Cr noted to be 1.10 in 9/2019. Likely 2/2 diabetic nephropathy vs sepsis vs poor po intake. s/p 1L bolus in ED  - Cr downtrending during admission  - f/u PCP    New meds: none  Labs to be followed: BMP  Exams to be followed: foot exam DISCHARGE NOTE DO NOT DELETE  40 y/o M with PMHx of type 2 IDDM, L foot osteomyelitis s/p L 1st partial ray (9/2019 @ [facility]) and L 4th digit amputation (2018 @ [place]), presents to [facility] with c/o worsening R great toe wound, found to have severe sepsis 2/2 wound infection. Patient admitted to medicine and started on broad spectrum antibiotics. Pt seen by podiatry who completed R great toe amputation with clean margins and no further antibiotics requires.     Severe sepsis.  - History of type 2 IDDM, presenting with 1 week of worsening R great toe wound. , WBC 12.99, Cr of 1.87 (Cr 1.10 in 9/2019). Lactate wnl. Found to have a purulent diabetic ulcer with surrounding cellulitis. s/p 1L NS bolus, Vanc, Zosyn in ED  - Concern for OM given elevated ESR/CRP   - s/p R great toe amputation and closure  - ID consulted - recommend Keflex 500mg 4times a day for 7 days      #Type 2 diabetes mellitus with complication  - Extensive history of type 2 IDDM with previous L foot/toe infections and amputations. Admits to insulin compliance; on Basaglar 20 units and Admelog 5units TID. Previously with A1C in 14s  - BHB slightly elevated to 0.7, however without significant hyperglycemia, no acidosis   - A1C 8.5 this admission  - c/w home regimen upon discharge     #GO (acute kidney injury).   - Cr of 1.87 this admission. Cr noted to be 1.10 in 9/2019. Likely 2/2 diabetic nephropathy vs sepsis vs poor po intake. s/p 1L bolus in ED  - Cr downtrending during admission  - f/u PCP    New meds: none  Labs to be followed: BMP  Exams to be followed: foot exam DISCHARGE NOTE DO NOT DELETE  42 y/o M with PMHx of type 2 IDDM, L foot osteomyelitis s/p L 1st partial ray (9/2019 @ [facility]) and L 4th digit amputation (2018 @ [place]), presents to [facility] with c/o worsening R great toe wound, found to have severe sepsis 2/2 wound infection. Patient admitted to medicine and started on broad spectrum antibiotics. Pt seen by podiatry who completed R great toe amputation with clean margins and no further antibiotics requires.     Severe sepsis.  - History of type 2 IDDM, presenting with 1 week of worsening R great toe wound. , WBC 12.99, Cr of 1.87 (Cr 1.10 in 9/2019). Lactate wnl. Found to have a purulent diabetic ulcer with surrounding cellulitis. s/p 1L NS bolus, Vanc, Zosyn in ED  - Concern for OM given elevated ESR/CRP   - s/p R great toe amputation and closure  - ID consulted - recommend Keflex 500mg 4times a day for 7 days      #Type 2 diabetes mellitus with complication  - Extensive history of type 2 IDDM with previous L foot/toe infections and amputations. Admits to insulin compliance; on Basaglar 20 units and Admelog 5units TID. Previously with A1C in 14s  - BHB slightly elevated to 0.7, however without significant hyperglycemia, no acidosis   - A1C 8.5 this admission  - Started on Atorvastatin 20mg qd inpatient and will be discharged on Atorvastatin 20mg  - c/w home regimen upon discharge       #GO (acute kidney injury).   - Cr of 1.87 this admission. Cr noted to be 1.10 in 9/2019. Likely 2/2 diabetic nephropathy vs sepsis vs poor po intake. s/p 1L bolus in ED  - Cr downtrending during admission  - f/u PCP    New meds: Atorvastatin 20mg qd, Keflex 500mg QID for 7 days  Labs to be followed: BMP  Exams to be followed: foot exam DISCHARGE NOTE DO NOT DELETE  42 y/o M with PMHx of type 2 IDDM, L foot osteomyelitis s/p L 1st partial ray (9/2019 @ [facility] and L 4th digit amputation (2018 @ [place]), presents to [facility] with c/o worsening R great toe wound, found to have severe sepsis 2/2 wound infection. Patient admitted to medicine and started on broad spectrum antibiotics. Pt seen by podiatry who completed R great toe amputation with clean margins and no further antibiotics requires.     Severe sepsis 2/2 R toe osteomyelitis  - History of type 2 IDDM, presenting with 1 week of worsening R great toe wound. , WBC 12.99, Cr of 1.87 (Cr 1.10 in 9/2019). Lactate wnl. Found to have a purulent diabetic ulcer with surrounding cellulitis. s/p 1L NS bolus, Vanc, Zosyn in ED  - Concern for OM given elevated ESR/CRP   - s/p R great toe amputation and closure  - ID consulted - recommend Keflex 500mg 4times a day for 7 days   - f/u podiatry outpatient      #Type 2 diabetes mellitus with complication  - Extensive history of type 2 IDDM with previous L foot/toe infections and amputations. Admits to insulin compliance; on Basaglar 20 units and Admelog 5units TID. Previously with A1C in 14s  - BHB slightly elevated to 0.7, however without significant hyperglycemia, no acidosis   - A1C 8.5 this admission  - Started on Atorvastatin 20mg qd inpatient and will be discharged on Atorvastatin 20mg  - c/w home regimen upon discharge       #GO (acute kidney injury).   - Cr of 1.87 this admission. Cr noted to be 1.10 in 9/2019. Likely 2/2 diabetic nephropathy vs sepsis vs poor po intake. s/p 1L bolus in ED  - Cr downtrending during admission  - f/u PCP    New meds: Atorvastatin 20mg qd, Keflex 500mg QID for 7 days  Labs to be followed: BMP  Exams to be followed: foot exam DISCHARGE NOTE DO NOT DELETE  40 y/o M with PMHx of type 2 IDDM, L foot osteomyelitis s/p L 1st partial ray (9/2019 @ [facility]) and L 4th digit amputation (2018 @ [place]), presents to [facility] with c/o worsening R great toe wound, found to have severe sepsis 2/2 wound infection. Patient admitted to medicine and started on broad spectrum antibiotics. Pt seen by podiatry who completed R great toe amputation with clean margins and no further antibiotics requires.     Severe sepsis 2/2 R toe osteomyelitis  - History of type 2 IDDM, presenting with 1 week of worsening R great toe wound. , WBC 12.99, Cr of 1.87 (Cr 1.10 in 9/2019). Lactate wnl. Found to have a purulent diabetic ulcer with surrounding cellulitis. s/p 1L NS bolus, Vanc, Zosyn in ED  - Concern for OM given elevated ESR/CRP   - s/p R great toe amputation and closure  - ID consulted - recommend Keflex 500mg 4times a day for 7 days   - f/u podiatry outpatient      #Type 2 diabetes mellitus with complication  - Extensive history of type 2 IDDM with previous L foot/toe infections and amputations. Admits to insulin compliance; on Basaglar 20 units and Admelog 5units TID. Previously with A1C in 14s  - BHB slightly elevated to 0.7, however without significant hyperglycemia, no acidosis   - A1C 8.5 this admission  - Started on Atorvastatin 20mg qd inpatient and will be discharged on Atorvastatin 20mg  - c/w home regimen upon discharge       #GO (acute kidney injury).   - Cr of 1.87 this admission. Cr noted to be 1.10 in 9/2019. Likely 2/2 diabetic nephropathy vs sepsis vs poor po intake. s/p 1L bolus in ED  - Cr downtrending during admission  - f/u PCP    New meds: Atorvastatin 20mg qd, Keflex 500mg QID for 7 days  Labs to be followed: BMP  Exams to be followed: foot exam    ATTENDING ATTESTATION  Date of Service: 5/26/21    I interviewed and examined Mahamed Roblero on the day of discharge and greater than 30 minutes were spent by the team on processing their hospital discharge and coordinating their post-hospital care.     I discussed the care plan with the resident team. I agree with the discharge plan as outlined in the Discharge Summary. I have personally reviewed the above discharge summary and made changes where necessary, and as noted below.    41YOM with history of insulin-dependent diabetes (a1c 8.5% this admission), prior osteomyelitis of left foor s/p multiple L foot amputations admitted for severe sepsis (leukocytosis, tachycardia, acute kidney injury likely ATN 2/2 sepsis) 2/2 diabetic foot infection with distal osteomyelitis of R great toe, s/p amputation of R great toe 5/21, with RTOR for delayed primary closure 5/24. Clean margins per podiatry, will complete 7 days total PO abx for skin/soft tissue infection per ID. Will follow up with PCP, podiatry, ID after discharge.    Physical exam on day of discharge:  General: pleasant, appropriate, no acute distress. Participating appropriately in interview.  HEENT: NC/AT, MMM  Neck: soft, supple, no lymphadenopathy  Cardiac: regular rhythm, normal rate, normal s1/s2, no murmurs, rubs, or gallops  Lungs: clear to auscultation bilaterally without wheezes, rales, or rhonchi. Normal work of breathing. Speaking in complete sentences.  Abdomen: soft, nontender, nondistended. Bowel sounds present and normoactive.   Extremities: moving all extremities. No edema.  Neuro: awake, alert, oriented x4. Follows commands. Moving all extremities. Sensation intact.  Psych: no evidence of AVH.  Skin: s/p R great toe amputation, dressed, no saturation. s/p multiple prior R toe amputations well healed    Jesus Price MD  Attending Hospitalist

## 2021-05-25 NOTE — DISCHARGE NOTE PROVIDER - PROVIDER TOKENS
FREE:[LAST:[[facility]],PHONE:[phone]],FAX:[(   )    -],ADDRESS:[[address] [place] [address]],ESTABLISHEDPATIENT:[T]] FREE:[LAST[facility]PHONE:[phone]],FAX:[(   )    -],ADDRESS:[[address] [place] [address]],ESTABLISHEDPATIENT:[T]],PROVIDER:[TOKEN:[[id number]]] PROVIDER:[TOKEN:[[id number]]],PROVIDER:[TOKEN:[[id number]],SCHEDULEDAPPT:[06/21/2021],SCHEDULEDAPPTTIME:[01:45 PM]],FREE:[LAST:[[facility]],PHONE:[phone]],FAX:[(   )    -],ADDRESS:[[address] [place] [address]],ESTABLISHEDPATIENT:[T]]

## 2021-05-25 NOTE — PROGRESS NOTE ADULT - ASSESSMENT
42 y/o M with PMHx of type 2 IDDM, L foot osteomyelitis s/p L 1st partial ray (9/2019 @ [facility]) and L 4th digit amputation (2018 @ [place]), presents to [facility] with c/o worsening R great toe wound, found to have severe sepsis 2/2 wound infection.

## 2021-05-25 NOTE — PROGRESS NOTE ADULT - SUBJECTIVE AND OBJECTIVE BOX
Patient is a 41y old  Male who presents with a chief complaint of Diabetic wound (25 May 2021 10:35)      INTERVAL HPI/ OVERNIGHT EVENTS: Pt seen and examined bedside. S/p R foot delayed primary closure.      LABS                        9.8    6.49  )-----------( 344      ( 24 May 2021 07:46 )             30.6     05-24    140  |  101  |  12  ----------------------------<  193<H>  3.6   |  29  |  1.34<H>    Ca    8.3<L>      24 May 2021 07:02  Phos  3.9     05-24  Mg     1.8     05-24      PT/INR - ( 24 May 2021 10:45 )   PT: 13.5 sec;   INR: 1.13          PTT - ( 24 May 2021 10:45 )  PTT:33.3 sec    ICU Vital Signs Last 24 Hrs  T(C): 36.6 (25 May 2021 09:23), Max: 36.9 (24 May 2021 15:17)  T(F): 97.8 (25 May 2021 09:23), Max: 98.4 (24 May 2021 15:17)  HR: 88 (25 May 2021 09:23) (60 - 88)  BP: 115/72 (25 May 2021 09:23) (112/57 - 156/90)  BP(mean): 104 (25 May 2021 00:30) (78 - 104)  ABP: --  ABP(mean): --  RR: 16 (25 May 2021 09:23) (16 - 24)  SpO2: 97% (25 May 2021 09:23) (97% - 100%)    RADIOLOGY    MICROBIOLOGY  Culture - Surgical Swab (05.25.21 @ 00:24)    Gram Stain:   Rare White blood cells  No organisms seen    Specimen Source: .Surgical Swab Right Foot First Toe or spec    PHYSICAL EXAM  Lower Extremity Focused  Vasc: DP 2/4 B/L. L PT 2/4, R PT 1/4.  Derm: R hallux amputation site closed with sutures intact. No drainage/purulence expressed. No malodor.   Neuro: Protective sensation intact b/l  MSK: S/P L partial 1st ray amp, L 4th digit amp. S/P R hallux amputation.

## 2021-05-25 NOTE — DISCHARGE NOTE PROVIDER - CARE PROVIDERS DIRECT ADDRESSES
,DirectAddress_Unknown ,DirectAddress_Unknown,[email or web address] ,[email or web address],DirectAddress_Unknown,DirectAddress_Unknown

## 2021-05-25 NOTE — DISCHARGE NOTE PROVIDER - CARE PROVIDER_API CALL
Hector Iyer,   [address] [place] [address]  Phone: [phone]  Fax: (   )    -  Established Patient  Follow Up Time:    Hector Mead Jaylon,   [address] [place] [address]  Phone: [phone]  Fax: (   )    -  Established Patient  Follow Up Time:     Bianca Kearney)  Infectious Disease; Internal Medicine  [address] [place] [address]  Phone: [phone]  Fax: [phone]  Follow Up Time:    Bianca Kearney)  Infectious Disease; Internal Medicine  [address] [place] [address]  Phone: [phone]  Fax: [phone]  Follow Up Time:     Erika Dumont  PODIATRIC MEDICINE AND SURGERY  [address] [address]  Phone: [phone]  Fax: [phone]  Scheduled Appointment: 06/21/2021 01:45 PM    Hector Mead Chappell[address] [address]  Phone: [phone]  Fax: (   )    -  Established Patient  Follow Up Time:

## 2021-05-25 NOTE — DISCHARGE NOTE PROVIDER - NSDCCPCAREPLAN_GEN_ALL_CORE_FT
PRINCIPAL DISCHARGE DIAGNOSIS  Diagnosis: Acute osteomyelitis  Assessment and Plan of Treatment: You came to the hospital because of a toe infection. You were given very strong antibiotics to help control the infection and prevent its spread. Your toe was very damaged from the infection and was removed to prevent the spread of infection. You do not need to take any antibiotics because post surgery no infection was seen.  Pleas follow up with your primary care physician      SECONDARY DISCHARGE DIAGNOSES  Diagnosis: Diabetes  Assessment and Plan of Treatment:      PRINCIPAL DISCHARGE DIAGNOSIS  Diagnosis: Acute osteomyelitis  Assessment and Plan of Treatment: You came to the hospital because of a toe infection. You were given very strong antibiotics to help control the infection and prevent its spread. Your toe was very damaged from the infection and was removed to prevent the spread of infection.You will be discharged on the antibiotc Keflex 500mg please take this 4 times a day breakfast, lunch, dinner and before bed.  Pleas follow up with your primary care physician and Infectious disease specilaist Dr. Bianca Kearney. Please contact Dr. Kearney's office at [phone] to schedule your appointment in 3 weeks.      SECONDARY DISCHARGE DIAGNOSES  Diagnosis: Diabetes  Assessment and Plan of Treatment: Type 2 diabetes (sometimes called type 2 "diabetes mellitus") is a disorder that disrupts the way your body uses sugar.  All the cells in your body need sugar to work normally. Sugar gets into the cells with the help of a hormone called insulin. If there is not enough insulin, or if the body stops responding to insulin, sugar builds up in the blood. That is what happens to people with diabetes.  There are 2 different types of diabetes. You have Type 2 diabetes, where the body's cells do not respond to insulin, the body does not make enough insulin, or both.  Type 2 diabetes usually causes no symptoms at first. When symptoms do occur, they include: needing to urinate often, intense thirst and blurry vision  Even though type 2 diabetes might not make you feel sick, it can cause serious problems over time, if it is not treated. The disorder can lead to heart attacks, strokes, kidney disease, vision problems (or even blindness), pain or loss of feeling in the hands and feet, the need to have fingers, toes, or other body parts removed (amputated).   There are a few medicines that help control blood sugar. Some people need to take pills that help the body make more insulin or that help insulin do its job. Others need insulin shots.  Depending on what medicines you take, you might need to check your blood sugar regularly at home. But not everyone with type 2 diabetes needs to do this. Your doctor or nurse will tell you if you should be checking your blood sugar, and when and how to do this.  Sometimes, people with type 2 diabetes also need medicines to reduce the problems caused by the disease. For instance, medicines used to lower blood pressure can reduce the chances of a heart attack or stroke.  You are being started on the medication Atorvastatin to help manage your cholesterol and reduce your risk of heart disease associated with diabetes. Please take atorvastation everyday before bedtime.     PRINCIPAL DISCHARGE DIAGNOSIS  Diagnosis: Acute osteomyelitis  Assessment and Plan of Treatment: You came to the hospital because of a toe infection. You were given very strong antibiotics to help control the infection and prevent its spread. Your toe was very damaged from the infection and was removed to prevent the spread of infection.You will be discharged on the antibiotc Keflex 500mg please take this 4 times a day breakfast, lunch, dinner and before bed.  Please follow up with your primary care physician and Infectious disease specilaist Dr. Bianca Kearney. Please contact Dr. Kearney's office at [phone] to schedule your appointment in 3 weeks. Additionally, please go to your scheduled appointment with our podiatrist Dr. Dumont.      SECONDARY DISCHARGE DIAGNOSES  Diagnosis: Diabetes  Assessment and Plan of Treatment: Type 2 diabetes (sometimes called type 2 "diabetes mellitus") is a disorder that disrupts the way your body uses sugar.  All the cells in your body need sugar to work normally. Sugar gets into the cells with the help of a hormone called insulin. If there is not enough insulin, or if the body stops responding to insulin, sugar builds up in the blood. That is what happens to people with diabetes.  There are 2 different types of diabetes. You have Type 2 diabetes, where the body's cells do not respond to insulin, the body does not make enough insulin, or both.  Type 2 diabetes usually causes no symptoms at first. When symptoms do occur, they include: needing to urinate often, intense thirst and blurry vision  Even though type 2 diabetes might not make you feel sick, it can cause serious problems over time, if it is not treated. The disorder can lead to heart attacks, strokes, kidney disease, vision problems (or even blindness), pain or loss of feeling in the hands and feet, the need to have fingers, toes, or other body parts removed (amputated).   There are a few medicines that help control blood sugar. Some people need to take pills that help the body make more insulin or that help insulin do its job. Others need insulin shots.  Depending on what medicines you take, you might need to check your blood sugar regularly at home. But not everyone with type 2 diabetes needs to do this. Your doctor or nurse will tell you if you should be checking your blood sugar, and when and how to do this.  Sometimes, people with type 2 diabetes also need medicines to reduce the problems caused by the disease. For instance, medicines used to lower blood pressure can reduce the chances of a heart attack or stroke.  You are being started on the medication Atorvastatin to help manage your cholesterol and reduce your risk of heart disease associated with diabetes. Please take atorvastation everyday before bedtime.

## 2021-05-26 ENCOUNTER — TRANSCRIPTION ENCOUNTER (OUTPATIENT)
Age: 41
End: 2021-05-26

## 2021-05-26 VITALS
SYSTOLIC BLOOD PRESSURE: 118 MMHG | OXYGEN SATURATION: 99 % | DIASTOLIC BLOOD PRESSURE: 75 MMHG | RESPIRATION RATE: 18 BRPM | TEMPERATURE: 98 F | HEART RATE: 73 BPM

## 2021-05-26 LAB
GLUCOSE BLDC GLUCOMTR-MCNC: 137 MG/DL — HIGH (ref 70–99)
GLUCOSE BLDC GLUCOMTR-MCNC: 64 MG/DL — LOW (ref 70–99)
GLUCOSE BLDC GLUCOMTR-MCNC: 85 MG/DL — SIGNIFICANT CHANGE UP (ref 70–99)

## 2021-05-26 PROCEDURE — 99239 HOSP IP/OBS DSCHRG MGMT >30: CPT | Mod: GC

## 2021-05-26 RX ORDER — INSULIN GLARGINE 100 [IU]/ML
8 INJECTION, SOLUTION SUBCUTANEOUS AT BEDTIME
Refills: 0 | Status: DISCONTINUED | OUTPATIENT
Start: 2021-05-26 | End: 2021-05-26

## 2021-05-26 RX ORDER — DEXTROSE 50 % IN WATER 50 %
25 SYRINGE (ML) INTRAVENOUS ONCE
Refills: 0 | Status: DISCONTINUED | OUTPATIENT
Start: 2021-05-26 | End: 2021-05-26

## 2021-05-26 RX ADMIN — Medication 500 MILLIGRAM(S): at 17:22

## 2021-05-26 RX ADMIN — Medication 500 MILLIGRAM(S): at 11:30

## 2021-05-26 RX ADMIN — Medication 500 MILLIGRAM(S): at 06:09

## 2021-05-26 NOTE — DISCHARGE NOTE NURSING/CASE MANAGEMENT/SOCIAL WORK - PATIENT PORTAL LINK FT
You can access the FollowMyHealth Patient Portal offered by [facility] by registering at the following website: [email or web address]. By joining [employer]’s FollowMyHealth portal, you will also be able to view your health information using other applications (apps) compatible with our system.

## 2021-05-26 NOTE — DISCHARGE NOTE NURSING/CASE MANAGEMENT/SOCIAL WORK - NSDCFUADDAPPT_GEN_ALL_CORE_FT
Please bring your Insurance card, Photo ID and Discharge paperwork to the following:    (1) Please contact Dr. Kearney's office at [phone] to schedule your appointment in 3 weeks.    (2) Please follow up with your Primary Care Provider at   [address] [address]  [phone]    Please note that there is no appointment needed, this clinic functions on a walk-in basis.  They will be opened on Monday- Saturday from 8:00am-5:00pm.  Please follow up within 1 week from your hospital discharge.    Appointment was facilitated by Ms. BIANCA Winston, Referral Coordinator.

## 2021-05-26 NOTE — PROGRESS NOTE ADULT - REASON FOR ADMISSION
Diabetic wound

## 2021-05-26 NOTE — PROGRESS NOTE ADULT - NSICDXPILOT_GEN_ALL_CORE
[place]

## 2021-05-26 NOTE — PROGRESS NOTE ADULT - PROVIDER SPECIALTY LIST ADULT
Podiatry
Hospitalist
Infectious Disease
Internal Medicine
Internal Medicine
Infectious Disease
Infectious Disease
Internal Medicine
Podiatry
Internal Medicine
Podiatry
Internal Medicine

## 2021-05-26 NOTE — PROGRESS NOTE ADULT - ASSESSMENT
42 yo M w/ PMHx Type 2 IDDM, L foot OM s/p L 1st partial ray (9/2019 @ [facility]) and L 4th digit amputation (2018 @ [place]) presents to [facility] ED c/o worsening R great toe wound a/w severe swelling and malodor of 1 week duration. Podiatry consulted for surgical mgmt of severely infected R great toe wound; requires amputation for trt of osteomyelitis of distal phalanx of R hallux. Pt is s/p amputation of right hallux w/ delayed primary closure (5/24/21)    P:   Abx per ID recs  Pain control PRN   WBAT to R foot in surgical shoe   Residual bone noted to be healthy. Low suspicion for residual osteomyelitis.  Plan d/w attending    Follow-up with Dr. Dumont within 1-2 weeks of discharge: [facility] [address] [place] [address], [phone]    Wound care instructions: Keep dressing clean, dry, and intact until follow-up w/ Dr. Dumont outpatient. If dressing requires changing: Irrigate right foot wound with normal saline.  Apply dry gauze to right foot wound. Wrap foot with kerlix/cling and an ACE bandage.

## 2021-05-26 NOTE — PROGRESS NOTE ADULT - SUBJECTIVE AND OBJECTIVE BOX
Patient is a 41y old  Male who presents with a chief complaint of Diabetic wound (25 May 2021 16:10)      INTERVAL HPI/ OVERNIGHT EVENTS: Pt seen and examined bedside. Pending discharge today to home.      LABS          PT/INR - ( 24 May 2021 10:45 )   PT: 13.5 sec;   INR: 1.13          PTT - ( 24 May 2021 10:45 )  PTT:33.3 sec    ICU Vital Signs Last 24 Hrs  T(C): 36.8 (26 May 2021 09:05), Max: 36.8 (26 May 2021 05:36)  T(F): 98.3 (26 May 2021 09:05), Max: 98.3 (26 May 2021 09:05)  HR: 81 (26 May 2021 09:05) (74 - 88)  BP: 130/72 (26 May 2021 09:05) (115/72 - 149/78)  BP(mean): --  ABP: --  ABP(mean): --  RR: 18 (26 May 2021 09:05) (16 - 18)  SpO2: 99% (26 May 2021 09:05) (97% - 99%)      RADIOLOGY    MICROBIOLOGY    PHYSICAL EXAM  Lower Extremity Focused  Vasc: DP 2/4 B/L. L PT 2/4, R PT 1/4.  Derm: R hallux amputation site closed with sutures intact. No drainage/purulence expressed. No malodor.   Neuro: Protective sensation intact b/l  MSK: S/P L partial 1st ray amp, L 4th digit amp. S/P R hallux amputation.

## 2021-05-27 LAB
-  CEFAZOLIN: SIGNIFICANT CHANGE UP
-  CEFAZOLIN: SIGNIFICANT CHANGE UP
-  CLINDAMYCIN: SIGNIFICANT CHANGE UP
-  CLINDAMYCIN: SIGNIFICANT CHANGE UP
-  ERYTHROMYCIN: SIGNIFICANT CHANGE UP
-  ERYTHROMYCIN: SIGNIFICANT CHANGE UP
-  LINEZOLID: SIGNIFICANT CHANGE UP
-  LINEZOLID: SIGNIFICANT CHANGE UP
-  OXACILLIN: SIGNIFICANT CHANGE UP
-  OXACILLIN: SIGNIFICANT CHANGE UP
-  RIFAMPIN: SIGNIFICANT CHANGE UP
-  RIFAMPIN: SIGNIFICANT CHANGE UP
-  TRIMETHOPRIM/SULFAMETHOXAZOLE: SIGNIFICANT CHANGE UP
-  TRIMETHOPRIM/SULFAMETHOXAZOLE: SIGNIFICANT CHANGE UP
-  VANCOMYCIN: SIGNIFICANT CHANGE UP
-  VANCOMYCIN: SIGNIFICANT CHANGE UP
CULTURE RESULTS: SIGNIFICANT CHANGE UP
CULTURE RESULTS: SIGNIFICANT CHANGE UP
METHOD TYPE: SIGNIFICANT CHANGE UP
METHOD TYPE: SIGNIFICANT CHANGE UP
ORGANISM # SPEC MICROSCOPIC CNT: SIGNIFICANT CHANGE UP
SPECIMEN SOURCE: SIGNIFICANT CHANGE UP
SPECIMEN SOURCE: SIGNIFICANT CHANGE UP

## 2021-06-01 LAB — SURGICAL PATHOLOGY STUDY: SIGNIFICANT CHANGE UP

## 2021-06-04 DIAGNOSIS — M86.171 OTHER ACUTE OSTEOMYELITIS, RIGHT ANKLE AND FOOT: ICD-10-CM

## 2021-06-04 DIAGNOSIS — E11.21 TYPE 2 DIABETES MELLITUS WITH DIABETIC NEPHROPATHY: ICD-10-CM

## 2021-06-04 DIAGNOSIS — D64.9 ANEMIA, UNSPECIFIED: ICD-10-CM

## 2021-06-04 DIAGNOSIS — E11.52 TYPE 2 DIABETES MELLITUS WITH DIABETIC PERIPHERAL ANGIOPATHY WITH GANGRENE: ICD-10-CM

## 2021-06-04 DIAGNOSIS — E86.0 DEHYDRATION: ICD-10-CM

## 2021-06-04 DIAGNOSIS — N17.0 ACUTE KIDNEY FAILURE WITH TUBULAR NECROSIS: ICD-10-CM

## 2021-06-04 DIAGNOSIS — R53.1 WEAKNESS: ICD-10-CM

## 2021-06-04 DIAGNOSIS — D69.6 THROMBOCYTOPENIA, UNSPECIFIED: ICD-10-CM

## 2021-06-04 DIAGNOSIS — E11.69 TYPE 2 DIABETES MELLITUS WITH OTHER SPECIFIED COMPLICATION: ICD-10-CM

## 2021-06-04 DIAGNOSIS — E11.65 TYPE 2 DIABETES MELLITUS WITH HYPERGLYCEMIA: ICD-10-CM

## 2021-06-04 DIAGNOSIS — E78.5 HYPERLIPIDEMIA, UNSPECIFIED: ICD-10-CM

## 2021-06-04 DIAGNOSIS — R65.20 SEVERE SEPSIS WITHOUT SEPTIC SHOCK: ICD-10-CM

## 2021-06-04 DIAGNOSIS — A41.9 SEPSIS, UNSPECIFIED ORGANISM: ICD-10-CM

## 2021-06-14 NOTE — DISCHARGE NOTE PROVIDER - NS AS DC PROVIDER CONTACT Y/N MULTI
Blood not yet started per Dr. Luis Fernando Roper. Units #  [id number]               [id number]  Sent with [facility].      Romayne Cristal, RN  06/14/21 0000 Yes

## 2021-06-22 PROCEDURE — 85730 THROMBOPLASTIN TIME PARTIAL: CPT

## 2021-06-22 PROCEDURE — 96374 THER/PROPH/DIAG INJ IV PUSH: CPT

## 2021-06-22 PROCEDURE — 93971 EXTREMITY STUDY: CPT

## 2021-06-22 PROCEDURE — 80053 COMPREHEN METABOLIC PANEL: CPT

## 2021-06-22 PROCEDURE — 73620 X-RAY EXAM OF FOOT: CPT

## 2021-06-22 PROCEDURE — 86140 C-REACTIVE PROTEIN: CPT

## 2021-06-22 PROCEDURE — 87040 BLOOD CULTURE FOR BACTERIA: CPT

## 2021-06-22 PROCEDURE — 86769 SARS-COV-2 COVID-19 ANTIBODY: CPT

## 2021-06-22 PROCEDURE — 85610 PROTHROMBIN TIME: CPT

## 2021-06-22 PROCEDURE — 82010 KETONE BODYS QUAN: CPT

## 2021-06-22 PROCEDURE — 85652 RBC SED RATE AUTOMATED: CPT

## 2021-06-22 PROCEDURE — 81001 URINALYSIS AUTO W/SCOPE: CPT

## 2021-06-22 PROCEDURE — 86900 BLOOD TYPING SEROLOGIC ABO: CPT

## 2021-06-22 PROCEDURE — 83036 HEMOGLOBIN GLYCOSYLATED A1C: CPT

## 2021-06-22 PROCEDURE — 85379 FIBRIN DEGRADATION QUANT: CPT

## 2021-06-22 PROCEDURE — 86850 RBC ANTIBODY SCREEN: CPT

## 2021-06-22 PROCEDURE — 84100 ASSAY OF PHOSPHORUS: CPT

## 2021-06-22 PROCEDURE — 71045 X-RAY EXAM CHEST 1 VIEW: CPT

## 2021-06-22 PROCEDURE — 88311 DECALCIFY TISSUE: CPT

## 2021-06-22 PROCEDURE — 97530 THERAPEUTIC ACTIVITIES: CPT

## 2021-06-22 PROCEDURE — 87184 SC STD DISK METHOD PER PLATE: CPT

## 2021-06-22 PROCEDURE — 96375 TX/PRO/DX INJ NEW DRUG ADDON: CPT

## 2021-06-22 PROCEDURE — U0003: CPT

## 2021-06-22 PROCEDURE — 97161 PT EVAL LOW COMPLEX 20 MIN: CPT

## 2021-06-22 PROCEDURE — 36415 COLL VENOUS BLD VENIPUNCTURE: CPT

## 2021-06-22 PROCEDURE — U0005: CPT

## 2021-06-22 PROCEDURE — 80202 ASSAY OF VANCOMYCIN: CPT

## 2021-06-22 PROCEDURE — 87186 SC STD MICRODIL/AGAR DIL: CPT

## 2021-06-22 PROCEDURE — 87075 CULTR BACTERIA EXCEPT BLOOD: CPT

## 2021-06-22 PROCEDURE — 83605 ASSAY OF LACTIC ACID: CPT

## 2021-06-22 PROCEDURE — 82803 BLOOD GASES ANY COMBINATION: CPT

## 2021-06-22 PROCEDURE — 73630 X-RAY EXAM OF FOOT: CPT

## 2021-06-22 PROCEDURE — 83735 ASSAY OF MAGNESIUM: CPT

## 2021-06-22 PROCEDURE — 99285 EMERGENCY DEPT VISIT HI MDM: CPT | Mod: 25

## 2021-06-22 PROCEDURE — 85025 COMPLETE CBC W/AUTO DIFF WBC: CPT

## 2021-06-22 PROCEDURE — 86901 BLOOD TYPING SEROLOGIC RH(D): CPT

## 2021-06-22 PROCEDURE — 80048 BASIC METABOLIC PNL TOTAL CA: CPT

## 2021-06-22 PROCEDURE — 88304 TISSUE EXAM BY PATHOLOGIST: CPT

## 2021-06-22 PROCEDURE — 87635 SARS-COV-2 COVID-19 AMP PRB: CPT

## 2021-06-22 PROCEDURE — 87070 CULTURE OTHR SPECIMN AEROBIC: CPT

## 2021-06-22 PROCEDURE — 93005 ELECTROCARDIOGRAM TRACING: CPT

## 2021-06-22 PROCEDURE — 82962 GLUCOSE BLOOD TEST: CPT

## 2021-10-05 NOTE — PHYSICAL THERAPY INITIAL EVALUATION ADULT - PATIENT/FAMILY/SIGNIFICANT OTHER GOALS STATEMENT, PT EVAL
to get better
[FreeTextEntry1] : see lab orders\par \par cont current meds \par \par close monitoring

## 2021-12-12 NOTE — PROGRESS NOTE ADULT - PROBLEM SELECTOR PLAN 1
1.76 cont present abx , ID and Vascular f/u   s/p L foot partial 1st ray resection w/ 2nd metatarsal head bone biopsy-   waiting for decision regarding abx course from ID depending upon biopsy results

## 2022-08-11 NOTE — H&P ADULT - ASSESSMENT
I have reviewed discharge instructions with the patient. The patient verbalized understanding.   Patient armband removed and shredded
40 y/o M came from [facility] with PMHx of DM non compliant with medications and PSx of Rt 4 th toe due to infection last yr presents to the ED with complaints of R knee swelling x 1 week. Patient states he had itching on Rt knee, scab was formed and then little pus was draining on squeezing since 2 days. His swelling is progressively worsening and unable to walk. He is feeling hot but never checked his temperature. He has ulcer on left great toe but no pain. He was diagnosed with DM 5 years ago, he was treated initially with pills but not well controlled. Insulin 20 units was added. He was complaining of diarrhea x 3 times since he was started on insulin and pills. He stopped taking insulin and pills since last month due to worsening diarrhea. Pt is unable to recall his medications, Pharmacy and PMD. Denies fever, chills, trauma, Nausea, vomiting.

## 2023-04-07 NOTE — ED PROVIDER NOTE - WR ORDER DATE AND TIME 1
20-May-2021 16:33 Azelaic Acid Counseling: Patient counseled that medicine may cause skin irritation and to avoid applying near the eyes.  In the event of skin irritation, the patient was advised to reduce the amount of the drug applied or use it less frequently.   The patient verbalized understanding of the proper use and possible adverse effects of azelaic acid.  All of the patient's questions and concerns were addressed.

## 2023-07-13 NOTE — H&P ADULT - NSHPSOCIALHISTORY_GEN_ALL_CORE
Xray machine for panoramic machine is down.  Unable to get exam done.   Denies smoking  Denies ETOH use  Denies illicit drug use

## 2023-08-30 NOTE — ED PROVIDER NOTE - PRIOR EKG STATUS
Pre-Placement Post Offer Functional Test Results    Job title(s) as written on PPFT: [employer]  Type [employer]    Test Result: YES, Candidate is able to perform all described essential job functions    
there is no prior EKG available for comparison
